# Patient Record
Sex: MALE | Race: ASIAN | NOT HISPANIC OR LATINO | URBAN - METROPOLITAN AREA
[De-identification: names, ages, dates, MRNs, and addresses within clinical notes are randomized per-mention and may not be internally consistent; named-entity substitution may affect disease eponyms.]

---

## 2015-11-13 RX ORDER — MONTELUKAST 4 MG/1
1 TABLET, CHEWABLE ORAL
Qty: 0 | Refills: 0 | DISCHARGE
Start: 2015-11-13

## 2015-11-13 RX ORDER — DIGOXIN 250 MCG
1 TABLET ORAL
Qty: 0 | Refills: 0 | DISCHARGE
Start: 2015-11-13

## 2015-11-13 RX ORDER — ALLOPURINOL 300 MG
1 TABLET ORAL
Qty: 0 | Refills: 0 | DISCHARGE
Start: 2015-11-13

## 2018-12-30 ENCOUNTER — INPATIENT (INPATIENT)
Facility: HOSPITAL | Age: 78
LOS: 4 days | Discharge: HOME | DRG: 378 | End: 2019-01-04
Attending: INTERNAL MEDICINE | Admitting: INTERNAL MEDICINE
Payer: MEDICARE

## 2018-12-30 VITALS
RESPIRATION RATE: 18 BRPM | TEMPERATURE: 98 F | HEART RATE: 109 BPM | WEIGHT: 199.96 LBS | HEIGHT: 67 IN | OXYGEN SATURATION: 97 % | SYSTOLIC BLOOD PRESSURE: 138 MMHG | DIASTOLIC BLOOD PRESSURE: 98 MMHG

## 2018-12-30 DIAGNOSIS — I73.9 PERIPHERAL VASCULAR DISEASE, UNSPECIFIED: ICD-10-CM

## 2018-12-30 DIAGNOSIS — N28.89 OTHER SPECIFIED DISORDERS OF KIDNEY AND URETER: ICD-10-CM

## 2018-12-30 DIAGNOSIS — K92.2 GASTROINTESTINAL HEMORRHAGE, UNSPECIFIED: ICD-10-CM

## 2018-12-30 DIAGNOSIS — Z98.89 OTHER SPECIFIED POSTPROCEDURAL STATES: Chronic | ICD-10-CM

## 2018-12-30 DIAGNOSIS — E11.9 TYPE 2 DIABETES MELLITUS WITHOUT COMPLICATIONS: ICD-10-CM

## 2018-12-30 DIAGNOSIS — Z96.653 PRESENCE OF ARTIFICIAL KNEE JOINT, BILATERAL: Chronic | ICD-10-CM

## 2018-12-30 DIAGNOSIS — Z98.49 CATARACT EXTRACTION STATUS, UNSPECIFIED EYE: Chronic | ICD-10-CM

## 2018-12-30 DIAGNOSIS — Z29.9 ENCOUNTER FOR PROPHYLACTIC MEASURES, UNSPECIFIED: ICD-10-CM

## 2018-12-30 DIAGNOSIS — I10 ESSENTIAL (PRIMARY) HYPERTENSION: ICD-10-CM

## 2018-12-30 DIAGNOSIS — I48.91 UNSPECIFIED ATRIAL FIBRILLATION: ICD-10-CM

## 2018-12-30 DIAGNOSIS — N40.0 BENIGN PROSTATIC HYPERPLASIA WITHOUT LOWER URINARY TRACT SYMPTOMS: ICD-10-CM

## 2018-12-30 DIAGNOSIS — E78.5 HYPERLIPIDEMIA, UNSPECIFIED: ICD-10-CM

## 2018-12-30 LAB
ALBUMIN SERPL ELPH-MCNC: 3.7 G/DL — SIGNIFICANT CHANGE UP (ref 3.3–5)
ALP SERPL-CCNC: 89 U/L — SIGNIFICANT CHANGE UP (ref 40–120)
ALT FLD-CCNC: 13 U/L — SIGNIFICANT CHANGE UP (ref 10–45)
ANION GAP SERPL CALC-SCNC: 10 MMOL/L — SIGNIFICANT CHANGE UP (ref 5–17)
APPEARANCE UR: CLEAR — SIGNIFICANT CHANGE UP
APTT BLD: 52.1 SEC — HIGH (ref 27.5–36.3)
AST SERPL-CCNC: 20 U/L — SIGNIFICANT CHANGE UP (ref 10–40)
BACTERIA # UR AUTO: NEGATIVE — SIGNIFICANT CHANGE UP
BASE EXCESS BLDV CALC-SCNC: 2.4 MMOL/L — HIGH (ref -2–2)
BASOPHILS # BLD AUTO: 0 K/UL — SIGNIFICANT CHANGE UP (ref 0–0.2)
BASOPHILS NFR BLD AUTO: 0.3 % — SIGNIFICANT CHANGE UP (ref 0–2)
BILIRUB SERPL-MCNC: 0.6 MG/DL — SIGNIFICANT CHANGE UP (ref 0.2–1.2)
BILIRUB UR-MCNC: NEGATIVE — SIGNIFICANT CHANGE UP
BLD GP AB SCN SERPL QL: NEGATIVE — SIGNIFICANT CHANGE UP
BUN SERPL-MCNC: 17 MG/DL — SIGNIFICANT CHANGE UP (ref 7–23)
CA-I SERPL-SCNC: 1.12 MMOL/L — SIGNIFICANT CHANGE UP (ref 1.12–1.3)
CALCIUM SERPL-MCNC: 9.2 MG/DL — SIGNIFICANT CHANGE UP (ref 8.4–10.5)
CHLORIDE BLDV-SCNC: 103 MMOL/L — SIGNIFICANT CHANGE UP (ref 96–108)
CHLORIDE SERPL-SCNC: 99 MMOL/L — SIGNIFICANT CHANGE UP (ref 96–108)
CO2 BLDV-SCNC: 31 MMOL/L — HIGH (ref 22–30)
CO2 SERPL-SCNC: 26 MMOL/L — SIGNIFICANT CHANGE UP (ref 22–31)
COLOR SPEC: YELLOW — SIGNIFICANT CHANGE UP
CREAT SERPL-MCNC: 0.66 MG/DL — SIGNIFICANT CHANGE UP (ref 0.5–1.3)
DIFF PNL FLD: NEGATIVE — SIGNIFICANT CHANGE UP
DIGOXIN SERPL-MCNC: 0.6 NG/ML — LOW (ref 0.8–2)
EOSINOPHIL # BLD AUTO: 0.2 K/UL — SIGNIFICANT CHANGE UP (ref 0–0.5)
EOSINOPHIL NFR BLD AUTO: 1.7 % — SIGNIFICANT CHANGE UP (ref 0–6)
EPI CELLS # UR: 0 /HPF — SIGNIFICANT CHANGE UP
GAS PNL BLDV: 129 MMOL/L — LOW (ref 136–145)
GAS PNL BLDV: SIGNIFICANT CHANGE UP
GLUCOSE BLDV-MCNC: 188 MG/DL — HIGH (ref 70–99)
GLUCOSE SERPL-MCNC: 208 MG/DL — HIGH (ref 70–99)
GLUCOSE UR QL: NEGATIVE — SIGNIFICANT CHANGE UP
HCO3 BLDV-SCNC: 30 MMOL/L — HIGH (ref 21–29)
HCT VFR BLD CALC: 40.4 % — SIGNIFICANT CHANGE UP (ref 39–50)
HCT VFR BLD CALC: 43.8 % — SIGNIFICANT CHANGE UP (ref 39–50)
HCT VFR BLDA CALC: 45 % — SIGNIFICANT CHANGE UP (ref 39–50)
HGB BLD CALC-MCNC: 14.6 G/DL — SIGNIFICANT CHANGE UP (ref 13–17)
HGB BLD-MCNC: 13.4 G/DL — SIGNIFICANT CHANGE UP (ref 13–17)
HGB BLD-MCNC: 14.7 G/DL — SIGNIFICANT CHANGE UP (ref 13–17)
HYALINE CASTS # UR AUTO: 1 /LPF — SIGNIFICANT CHANGE UP (ref 0–2)
INR BLD: 3.25 RATIO — HIGH (ref 0.88–1.16)
KETONES UR-MCNC: NEGATIVE — SIGNIFICANT CHANGE UP
LACTATE BLDV-MCNC: 3.3 MMOL/L — HIGH (ref 0.7–2)
LEUKOCYTE ESTERASE UR-ACNC: NEGATIVE — SIGNIFICANT CHANGE UP
LIDOCAIN IGE QN: 26 U/L — SIGNIFICANT CHANGE UP (ref 7–60)
LYMPHOCYTES # BLD AUTO: 1.3 K/UL — SIGNIFICANT CHANGE UP (ref 1–3.3)
LYMPHOCYTES # BLD AUTO: 10.4 % — LOW (ref 13–44)
MCHC RBC-ENTMCNC: 31.2 PG — SIGNIFICANT CHANGE UP (ref 27–34)
MCHC RBC-ENTMCNC: 31.8 PG — SIGNIFICANT CHANGE UP (ref 27–34)
MCHC RBC-ENTMCNC: 33.2 GM/DL — SIGNIFICANT CHANGE UP (ref 32–36)
MCHC RBC-ENTMCNC: 33.5 GM/DL — SIGNIFICANT CHANGE UP (ref 32–36)
MCV RBC AUTO: 94.2 FL — SIGNIFICANT CHANGE UP (ref 80–100)
MCV RBC AUTO: 95 FL — SIGNIFICANT CHANGE UP (ref 80–100)
MONOCYTES # BLD AUTO: 0.4 K/UL — SIGNIFICANT CHANGE UP (ref 0–0.9)
MONOCYTES NFR BLD AUTO: 3.6 % — SIGNIFICANT CHANGE UP (ref 2–14)
NEUTROPHILS # BLD AUTO: 10.1 K/UL — HIGH (ref 1.8–7.4)
NEUTROPHILS NFR BLD AUTO: 84 % — HIGH (ref 43–77)
NITRITE UR-MCNC: NEGATIVE — SIGNIFICANT CHANGE UP
OTHER CELLS CSF MANUAL: 9 ML/DL — LOW (ref 18–22)
PCO2 BLDV: 59 MMHG — HIGH (ref 35–50)
PH BLDV: 7.32 — LOW (ref 7.35–7.45)
PH UR: 7 — SIGNIFICANT CHANGE UP (ref 5–8)
PLATELET # BLD AUTO: 177 K/UL — SIGNIFICANT CHANGE UP (ref 150–400)
PLATELET # BLD AUTO: 184 K/UL — SIGNIFICANT CHANGE UP (ref 150–400)
PO2 BLDV: 28 MMHG — SIGNIFICANT CHANGE UP (ref 25–45)
POTASSIUM BLDV-SCNC: 5.5 MMOL/L — HIGH (ref 3.5–5.3)
POTASSIUM SERPL-MCNC: 4.6 MMOL/L — SIGNIFICANT CHANGE UP (ref 3.5–5.3)
POTASSIUM SERPL-SCNC: 4.6 MMOL/L — SIGNIFICANT CHANGE UP (ref 3.5–5.3)
PROT SERPL-MCNC: 6.9 G/DL — SIGNIFICANT CHANGE UP (ref 6–8.3)
PROT UR-MCNC: SIGNIFICANT CHANGE UP
PROTHROM AB SERPL-ACNC: 38.4 SEC — HIGH (ref 10–12.9)
RBC # BLD: 4.29 M/UL — SIGNIFICANT CHANGE UP (ref 4.2–5.8)
RBC # BLD: 4.61 M/UL — SIGNIFICANT CHANGE UP (ref 4.2–5.8)
RBC # FLD: 13.4 % — SIGNIFICANT CHANGE UP (ref 10.3–14.5)
RBC # FLD: 13.8 % — SIGNIFICANT CHANGE UP (ref 10.3–14.5)
RBC CASTS # UR COMP ASSIST: 6 /HPF — HIGH (ref 0–4)
RH IG SCN BLD-IMP: POSITIVE — SIGNIFICANT CHANGE UP
RH IG SCN BLD-IMP: POSITIVE — SIGNIFICANT CHANGE UP
SAO2 % BLDV: 44 % — LOW (ref 67–88)
SODIUM SERPL-SCNC: 135 MMOL/L — SIGNIFICANT CHANGE UP (ref 135–145)
SP GR SPEC: 1.02 — SIGNIFICANT CHANGE UP (ref 1.01–1.02)
UROBILINOGEN FLD QL: NEGATIVE — SIGNIFICANT CHANGE UP
WBC # BLD: 12.1 K/UL — HIGH (ref 3.8–10.5)
WBC # BLD: 7.9 K/UL — SIGNIFICANT CHANGE UP (ref 3.8–10.5)
WBC # FLD AUTO: 12.1 K/UL — HIGH (ref 3.8–10.5)
WBC # FLD AUTO: 7.9 K/UL — SIGNIFICANT CHANGE UP (ref 3.8–10.5)
WBC UR QL: 1 /HPF — SIGNIFICANT CHANGE UP (ref 0–5)

## 2018-12-30 PROCEDURE — 74177 CT ABD & PELVIS W/CONTRAST: CPT | Mod: 26

## 2018-12-30 PROCEDURE — 71045 X-RAY EXAM CHEST 1 VIEW: CPT | Mod: 26

## 2018-12-30 PROCEDURE — 99285 EMERGENCY DEPT VISIT HI MDM: CPT

## 2018-12-30 RX ORDER — TAMSULOSIN HYDROCHLORIDE 0.4 MG/1
0.4 CAPSULE ORAL AT BEDTIME
Qty: 0 | Refills: 0 | Status: DISCONTINUED | OUTPATIENT
Start: 2018-12-30 | End: 2019-01-04

## 2018-12-30 RX ORDER — LOSARTAN POTASSIUM 100 MG/1
50 TABLET, FILM COATED ORAL
Qty: 0 | Refills: 0 | Status: DISCONTINUED | OUTPATIENT
Start: 2018-12-30 | End: 2019-01-04

## 2018-12-30 RX ORDER — SODIUM CHLORIDE 9 MG/ML
500 INJECTION INTRAMUSCULAR; INTRAVENOUS; SUBCUTANEOUS ONCE
Qty: 0 | Refills: 0 | Status: COMPLETED | OUTPATIENT
Start: 2018-12-30 | End: 2018-12-30

## 2018-12-30 RX ORDER — PANTOPRAZOLE SODIUM 20 MG/1
8 TABLET, DELAYED RELEASE ORAL
Qty: 80 | Refills: 0 | Status: DISCONTINUED | OUTPATIENT
Start: 2018-12-30 | End: 2019-01-02

## 2018-12-30 RX ORDER — ALLOPURINOL 300 MG
300 TABLET ORAL AT BEDTIME
Qty: 0 | Refills: 0 | Status: DISCONTINUED | OUTPATIENT
Start: 2018-12-30 | End: 2019-01-04

## 2018-12-30 RX ORDER — ATORVASTATIN CALCIUM 80 MG/1
20 TABLET, FILM COATED ORAL AT BEDTIME
Qty: 0 | Refills: 0 | Status: DISCONTINUED | OUTPATIENT
Start: 2018-12-30 | End: 2019-01-04

## 2018-12-30 RX ORDER — MONTELUKAST 4 MG/1
10 TABLET, CHEWABLE ORAL AT BEDTIME
Qty: 0 | Refills: 0 | Status: DISCONTINUED | OUTPATIENT
Start: 2018-12-30 | End: 2019-01-04

## 2018-12-30 RX ORDER — DIGOXIN 250 MCG
0.25 TABLET ORAL DAILY
Qty: 0 | Refills: 0 | Status: DISCONTINUED | OUTPATIENT
Start: 2018-12-30 | End: 2019-01-04

## 2018-12-30 RX ORDER — CARVEDILOL PHOSPHATE 80 MG/1
25 CAPSULE, EXTENDED RELEASE ORAL EVERY 12 HOURS
Qty: 0 | Refills: 0 | Status: DISCONTINUED | OUTPATIENT
Start: 2018-12-30 | End: 2019-01-04

## 2018-12-30 RX ADMIN — MONTELUKAST 10 MILLIGRAM(S): 4 TABLET, CHEWABLE ORAL at 22:05

## 2018-12-30 RX ADMIN — TAMSULOSIN HYDROCHLORIDE 0.4 MILLIGRAM(S): 0.4 CAPSULE ORAL at 22:05

## 2018-12-30 RX ADMIN — SODIUM CHLORIDE 500 MILLILITER(S): 9 INJECTION INTRAMUSCULAR; INTRAVENOUS; SUBCUTANEOUS at 15:26

## 2018-12-30 RX ADMIN — LOSARTAN POTASSIUM 50 MILLIGRAM(S): 100 TABLET, FILM COATED ORAL at 22:05

## 2018-12-30 RX ADMIN — CARVEDILOL PHOSPHATE 25 MILLIGRAM(S): 80 CAPSULE, EXTENDED RELEASE ORAL at 22:05

## 2018-12-30 RX ADMIN — PANTOPRAZOLE SODIUM 10 MG/HR: 20 TABLET, DELAYED RELEASE ORAL at 20:16

## 2018-12-30 RX ADMIN — Medication 300 MILLIGRAM(S): at 22:05

## 2018-12-30 RX ADMIN — ATORVASTATIN CALCIUM 20 MILLIGRAM(S): 80 TABLET, FILM COATED ORAL at 22:05

## 2018-12-30 RX ADMIN — SODIUM CHLORIDE 500 MILLILITER(S): 9 INJECTION INTRAMUSCULAR; INTRAVENOUS; SUBCUTANEOUS at 13:29

## 2018-12-30 NOTE — H&P ADULT - NSHPREVIEWOFSYSTEMS_GEN_ALL_CORE
REVIEW OF SYSTEMS:    CONSTITUTIONAL: No weakness, fevers or chills  EYES/ENT: No visual changes;  No vertigo or throat pain   NECK: No pain or stiffness  RESPIRATORY: No cough, wheezing, hemoptysis; No shortness of breath  CARDIOVASCULAR: No chest pain or palpitations  GASTROINTESTINAL: bloody and black stool   GENITOURINARY: No dysuria, frequency or hematuria  NEUROLOGICAL: No numbness or weakness  SKIN: No itching, burning, rashes, or lesions   All other review of systems is negative unless indicated above.

## 2018-12-30 NOTE — H&P ADULT - PROBLEM SELECTOR PLAN 3
suprather NR  Hold Coumadin   Card evaluation Dr Walden   restart Dig, Carvedilol and losartan   monitor vitals suprather NR  Hold Coumadin, restart after bleeding resolved if clear y GI and Card   Card evaluation Dr Walden   restart Dig, Carvedilol and losartan   monitor vitals

## 2018-12-30 NOTE — H&P ADULT - PROBLEM SELECTOR PLAN 1
GI evaluation  IV hydration   active type and screen   CT Abd/pelv noted.? mass. also 0.8 cm renal mass   protonix drip   liquid diet

## 2018-12-30 NOTE — ED ADULT NURSE NOTE - TEMPLATE
Advancement-Rotation Flap Text: The defect edges were debeveled with a #15 scalpel blade. Given the location of the defect, shape of the defect and the proximity to free margins an advancement-rotation flap was deemed most appropriate. Using a sterile surgical marker, an appropriate flap was drawn incorporating the defect and placing the expected incisions within the relaxed skin tension lines where possible. The area thus outlined was incised deep to adipose tissue with a #15 scalpel blade. The skin margins were undermined to an appropriate distance in all directions utilizing iris scissors. Stage 13: Number Of Blocks?: 0 Surgeon/Pathologist Verbiage (Will Incorporate Name Of Surgeon From Intro If Not Blank): operated in two distinct and integrated capacities as the surgeon and pathologist. Mohs Histo Method Verbiage: Each section was then chromacoded and processed in the Mohs lab using the Mohs protocol and submitted for frozen section. Cheiloplasty (Complex) Text: A decision was made to reconstruct the defect with a  cheiloplasty. The defect was undermined extensively. Additional obicularis oris muscle was excised with a 15 blade scalpel. The defect was converted into a full thickness wedge to facilite a better cosmetic result. Small vessels were then tied off with 5-0 monocyrl. The obicularis oris, superficial fascia, adipose and dermis were then reapproximated. After the deeper layers were approximated the epidermis was reapproximated with particular care given to realign the vermilion border. Stage 14: Additional Anesthesia Type: 1% lidocaine with epinephrine Referring Physician (Optional): Miryam Humphrey Referred To Oculoplastics For Closure Text (Leave Blank If You Do Not Want): After obtaining clear surgical margins the patient was sent to oculoplastics for surgical repair. The patient understands they will receive post-surgical care and follow-up from the referring physician's office. Postop Diagnosis: same Biopsy Photograph Reviewed: Yes Island Pedicle Flap-Requiring Vessel Identification Text: The defect edges were debeveled with a #15 scalpel blade. Given the location of the defect, shape of the defect and the proximity to free margins an island pedicle advancement flap was deemed most appropriate. Using a sterile surgical marker, an appropriate advancement flap was drawn, based on the axial vessel mentioned above, incorporating the defect, outlining the appropriate donor tissue and placing the expected incisions within the relaxed skin tension lines where possible. The area thus outlined was incised deep to adipose tissue with a #15 scalpel blade. The skin margins were undermined to an appropriate distance in all directions around the primary defect and laterally outward around the island pedicle utilizing iris scissors. There was minimal undermining beneath the pedicle flap. Hemostasis: Electrocautery Ear Star Wedge Flap Text: The defect edges were debeveled with a #15 blade scalpel. Given the location of the defect and the proximity to free margins (helical rim) an ear star wedge flap was deemed most appropriate. Using a sterile surgical marker, the appropriate flap was drawn incorporating the defect and placing the expected incisions between the helical rim and antihelix where possible. The area thus outlined was incised through and through with a #15 scalpel blade. Anesthesia Volume In Cc: 3 Stage 1: Number Of Blocks?: 1 Scc Histology Text: There is hyperkeratosis, acanthosis, and cytologic atypia of keratinocytes with hyperchromatic and pleomorphic nuclei throughout the epidermis. No dermal invasion is seen. Chronic inflammation is present in the dermis. Patient Will Remove Sutures At Home?: No Composite Graft Text: The defect edges were debeveled with a #15 scalpel blade. Given the location of the defect, shape of the defect, the proximity to free margins and the fact the defect was full thickness a composite graft was deemed most appropriate. The defect was outline and then transferred to the donor site. A full thickness graft was then excised from the donor site. The graft was then placed in the primary defect, oriented appropriately and then sutured into place. The secondary defect was then repaired using a primary closure. Bcc Superficial Histology Text: Beneath an irregular epidermis, there are small nodular masses of basaloid neoplastic cells with nuclear pleomorphism attached to the basal layer and surrounded by a loose fibrous stroma and mild inflammation in the superficial dermis. The findings are typical for a superficial type of basal cell carcinoma. Crescentic Advancement Flap Text: The defect edges were debeveled with a #15 scalpel blade. Given the location of the defect and the proximity to free margins a crescentic advancement flap was deemed most appropriate. Using a sterile surgical marker, the appropriate advancement flap was drawn incorporating the defect and placing the expected incisions within the relaxed skin tension lines where possible. The area thus outlined was incised deep to adipose tissue with a #15 scalpel blade. The skin margins were undermined to an appropriate distance in all directions utilizing iris scissors. No Repair - Repaired With Adjacent Surgical Defect Text (Leave Blank If You Do Not Want): After obtaining clear surgical margins the defect was repaired concurrently with another surgical defect which was in close approximation. Home Suture Removal Text: Patient was provided instructions on removing sutures and will remove their sutures at home. If they have any questions or difficulties they will call the office. Melanoma In Situ Histology Text: On a sun-damaged skin, there is a broad and asymmetrical proliferation of enlarged and atypical melanocytes present continuously as single cells and in small irregular coalescing nests along the dermoepidermal junction. The melanocytes extend into the superficial portions of epithelial structures of adnexa. Pagetoid spread of melanocytes is appreciated. Occasional mitotic figures and individually necrotic melanocytes are also noted. In the underlying papillary dermis, there is mild lymphocytic inflammatory infiltrate with prominent dermal fibroplasia and melanophages. Bilobed Transposition Flap Text: The defect edges were debeveled with a #15 scalpel blade. Given the location of the defect and the proximity to free margins a bilobed transposition flap was deemed most appropriate. Using a sterile surgical marker, an appropriate bilobe flap drawn around the defect. The area thus outlined was incised deep to adipose tissue with a #15 scalpel blade. The skin margins were undermined to an appropriate distance in all directions utilizing iris scissors. Depth Of Tumor Invasion (For Histology): adipose tissue Consent (Temporal Branch)/Introductory Paragraph: The rationale for Mohs was explained to the patient and consent was obtained. The risks, benefits and alternatives to therapy were discussed in detail. Specifically, the risks of damage to the temporal branch of the facial nerve, infection, scarring, bleeding, prolonged wound healing, incomplete removal, allergy to anesthesia, and recurrence were addressed. Prior to the procedure, the treatment site was clearly identified and confirmed by the patient. All components of Universal Protocol/PAUSE Rule completed. Full Thickness Lip Wedge Repair (Flap) Text: Given the location of the defect and the proximity to free margins a full thickness wedge repair was deemed most appropriate. Using a sterile surgical marker, the appropriate repair was drawn incorporating the defect and placing the expected incisions perpendicular to the vermilion border. The vermilion border was also meticulously outlined to ensure appropriate reapproximation during the repair. The area thus outlined was incised through and through with a #15 scalpel blade. The muscularis and dermis were reaproximated with deep sutures following hemostasis. Care was taken to realign the vermilion border before proceeding with the superficial closure. Once the vermilion was realigned the superfical and mucosal closure was finished. O-L Flap Text: The defect edges were debeveled with a #15 scalpel blade. Given the location of the defect, shape of the defect and the proximity to free margins an O-L flap was deemed most appropriate. Using a sterile surgical marker, an appropriate advancement flap was drawn incorporating the defect and placing the expected incisions within the relaxed skin tension lines where possible. The area thus outlined was incised deep to adipose tissue with a #15 scalpel blade. The skin margins were undermined to an appropriate distance in all directions utilizing iris scissors. Rotation Flap Text: The defect edges were debeveled with a #15 scalpel blade. Given the location of the defect, shape of the defect and the proximity to free margins a rotation flap was deemed most appropriate. Using a sterile surgical marker, an appropriate rotation flap was drawn incorporating the defect and placing the expected incisions within the relaxed skin tension lines where possible. The area thus outlined was incised deep to adipose tissue with a #15 scalpel blade. The skin margins were undermined to an appropriate distance in all directions utilizing iris scissors. Referred To Plastics For Closure Text (Leave Blank If You Do Not Want): After obtaining clear surgical margins the patient was sent to plastics for surgical repair. The patient understands they will receive post-surgical care and follow-up from the referring physician's office. Closure 2 Information: This tab is for additional flaps and grafts, including complex repair and grafts and complex repair and flaps. You can also specify a different location for the additional defect, if the location is the same you do not need to select a new one. We will insert the automated text for the repair you select below just as we do for solitary flaps and grafts. Please note that at this time if you select a location with a different insurance zone you will need to override the ICD10 and CPT if appropriate. Anesthesia Type: 2% lidocaine with epinephrine and a 1:12 solution of 8.4% sodium bicarbonate Dermal Autograft Text: The defect edges were debeveled with a #15 scalpel blade. Given the location of the defect, shape of the defect and the proximity to free margins a dermal autograft was deemed most appropriate. Using a sterile surgical marker, the primary defect shape was transferred to the donor site. The area thus outlined was incised deep to adipose tissue with a #15 scalpel blade. The harvested graft was then trimmed of adipose and epidermal tissue until only dermis was left. The skin graft was then placed in the primary defect and oriented appropriately. Helical Rim Advancement Flap Text: The defect edges were debeveled with a #15 blade scalpel. Given the location of the defect and the proximity to free margins (helical rim) a double helical rim advancement flap was deemed most appropriate. Using a sterile surgical marker, the appropriate advancement flaps were drawn incorporating the defect and placing the expected incisions between the helical rim and antihelix where possible. The area thus outlined was incised through and through with a #15 scalpel blade. With a skin hook and iris scissors, the flaps were gently and sharply undermined and freed up. Stage 6: Additional Anesthesia Type: 2% lidocaine without epinephrine and a 1:12 solution of 8.4% sodium bicarbonate Bcc Infiltrative Histology Text: There were numerous aggregates of basaloid cells demonstrating an infiltrative pattern. Medical Necessity Statement: Based on my medical judgement, standard excision and/or destruction technique methods are not clinically sufficient treatment options  . To prevent the risk of compromising surgical cure and reconstruction; prompt microscopic examination of the surgical margins is necessary. Based on the given indication(s), maximum conservation of healthy tissue, and high cure rate, Mohs surgery is the most appropriate treatment for this cancer. Alternatives Discussed Intro (Do Not Add Period): I discussed alternative treatments to Mohs surgery and specifically discussed the risks and benefits of Interpolation Flap Text: A decision was made to reconstruct the defect utilizing an interpolation axial flap and a staged reconstruction. A telfa template was made of the defect. This telfa template was then used to outline the interpolation flap. The donor area for the pedicle flap was then injected with anesthesia. The flap was excised through the skin and subcutaneous tissue down to the layer of the underlying musculature. The interpolation flap was carefully excised within this deep plane to maintain its blood supply. The edges of the donor site were undermined. The donor site was closed in a primary fashion. The pedicle was then rotated into position and sutured. Once the tube was sutured into place, adequate blood supply was confirmed with blanching and refill. The pedicle was then wrapped with xeroform gauze and dressed appropriately with a telfa and gauze bandage to ensure continued blood supply and protect the attached pedicle. Dorsal Nasal Flap Text: The defect edges were debeveled with a #15 scalpel blade. Given the location of the defect and the proximity to free margins a dorsal nasal flap was deemed most appropriate. Using a sterile surgical marker, an appropriate dorsal nasal flap was drawn around the defect. The area thus outlined was incised deep to adipose tissue with a #15 scalpel blade. The skin margins were undermined to an appropriate distance in all directions utilizing iris scissors. Consent (Lip)/Introductory Paragraph: The rationale for Mohs was explained to the patient and consent was obtained. The risks, benefits and alternatives to therapy were discussed in detail. Specifically, the risks of lip deformity, changes in the oral aperture, infection, scarring, bleeding, prolonged wound healing, incomplete removal, allergy to anesthesia, nerve injury and recurrence were addressed. Prior to the procedure, the treatment site was clearly identified and confirmed by the patient. All components of Universal Protocol/PAUSE Rule completed. Epidermal Sutures: 5-0 Fast Absorbing Gut Wound Care: Vaseline Z Plasty Text: The lesion was extirpated to the level of the fat with a #15 scalpel blade. Given the location of the defect, shape of the defect and the proximity to free margins a Z-plasty was deemed most appropriate for repair. Using a sterile surgical marker, the appropriate transposition arms of the Z-plasty were drawn incorporating the defect and placing the expected incisions within the relaxed skin tension lines where possible. The area thus outlined was incised deep to adipose tissue with a #15 scalpel blade. The skin margins were undermined to an appropriate distance in all directions utilizing iris scissors. The opposing transposition arms were then transposed into place in opposite direction and anchored with interrupted buried subcutaneous sutures. Mohs Case Number: [id number] [place] Abdominal Pain, N/V/D Consent 1/Introductory Paragraph: Various treatment options for skin cancer treatment; including but not limited to no treatment, cryosurgery or cryotherapy, excision, radiation therapy, electrodessication and curettage, topical therapeutic agents and light therapy were discussed in depth with the patient. The rationale for Mohs was explained to the patient and consent was obtained. The risks, benefits and alternatives to therapy were discussed in detail. Specifically, the risks of infection, scarring, bleeding, prolonged wound healing, incomplete removal, allergy to anesthesia, nerve injury and recurrence were addressed. Prior to the procedure, the treatment site was clearly identified and confirmed by the patient and the patient agreed that Mohs surgery is the best treatment option for their lesion. No guarantees were made or implied; close follow-up was stressed out to the patient. All components of Universal Protocol/PAUSE Rule completed. Purse String (Intermediate) Text: Given the location of the defect and the characteristics of the surrounding skin a pursestring intermediate closure was deemed most appropriate. Undermining was performed circumfirentially around the surgical defect. A purstring suture was then placed and tightened. Paramedian Forehead Flap Text: A decision was made to reconstruct the defect utilizing an interpolation axial flap and a staged reconstruction. A telfa template was made of the defect. This telfa template was then used to outline the paramedian forehead pedicle flap. The donor area for the pedicle flap was then injected with anesthesia. The flap was excised through the skin and subcutaneous tissue down to the layer of the underlying musculature. The pedicle flap was carefully excised within this deep plane to maintain its blood supply. The edges of the donor site were undermined. The donor site was closed in a primary fashion. The pedicle was then rotated into position and sutured. Once the tube was sutured into place, adequate blood supply was confirmed with blanching and refill. The pedicle was then wrapped with xeroform gauze and dressed appropriately with a telfa and gauze bandage to ensure continued blood supply and protect the attached pedicle. Area L Indication Text: Tumors in this location are included in Area L (trunk and extremities). Mohs surgery is indicated for larger tumors, or tumors with aggressive histologic features, in these anatomic locations. Crescentic Complex Repair Preamble Text (Leave Blank If You Do Not Want): Extensive wide undermining was performed. Mastoid Interpolation Flap Text: A decision was made to reconstruct the defect utilizing an interpolation axial flap and a staged reconstruction. A telfa template was made of the defect. This telfa template was then used to outline the mastoid interpolation flap. The donor area for the pedicle flap was then injected with anesthesia. The flap was excised through the skin and subcutaneous tissue down to the layer of the underlying musculature. The pedicle flap was carefully excised within this deep plane to maintain its blood supply. The edges of the donor site were undermined. The donor site was closed in a primary fashion. The pedicle was then rotated into position and sutured. Once the tube was sutured into place, adequate blood supply was confirmed with blanching and refill. The pedicle was then wrapped with xeroform gauze and dressed appropriately with a telfa and gauze bandage to ensure continued blood supply and protect the attached pedicle. Split-Thickness Skin Graft Text: The defect edges were debeveled with a #15 scalpel blade. Given the location of the defect, shape of the defect and the proximity to free margins a split thickness skin graft was deemed most appropriate. Using a sterile surgical marker, the primary defect shape was transferred to the donor site. The split thickness graft was then harvested. The skin graft was then placed in the primary defect and oriented appropriately. H Plasty Text: Given the location of the defect, shape of the defect and the proximity to free margins a H-plasty was deemed most appropriate for repair. Using a sterile surgical marker, the appropriate advancement arms of the H-plasty were drawn incorporating the defect and placing the expected incisions within the relaxed skin tension lines where possible. The area thus outlined was incised deep to adipose tissue with a #15 scalpel blade. The skin margins were undermined to an appropriate distance in all directions utilizing iris scissors. The opposing advancement arms were then advanced into place in opposite direction and anchored with interrupted buried subcutaneous sutures. Scc Ka Subtype Histology Text: there is a cup-shaped exoendophytic epithelial neoplasm characterized by proliferations of keratinocytes with abundant eosinophilic glossy cytoplasm and nuclei with open chromatin in the papillary and upper reticular dermis. Multiple inclusions containing elastic material are seen within the cytoplasm. The features are of squamous cell carcinoma, keratoacanthoma type. Closure 4 Information: This tab is for additional flaps and grafts above and beyond our usual structured repairs. Please note if you enter information here it will not currently bill and you will need to add the billing information manually. Island Pedicle Flap With Canthal Suspension Text: The defect edges were debeveled with a #15 scalpel blade. Given the location of the defect, shape of the defect and the proximity to free margins an island pedicle advancement flap was deemed most appropriate. Using a sterile surgical marker, an appropriate advancement flap was drawn incorporating the defect, outlining the appropriate donor tissue and placing the expected incisions within the relaxed skin tension lines where possible. The area thus outlined was incised deep to adipose tissue with a #15 scalpel blade. The skin margins were undermined to an appropriate distance in all directions around the primary defect and laterally outward around the island pedicle utilizing iris scissors. There was minimal undermining beneath the pedicle flap. A suspension suture was placed in the canthal tendon to prevent tension and prevent ectropion. Stage 4: Additional Anesthesia Type: 2% lidocaine with epinephrine and a 1:10 solution of 8.4% sodium bicarbonate Mucosal Advancement Flap Text: Given the location of the defect, shape of the defect and the proximity to free margins a mucosal advancement flap was deemed most appropriate. Incisions were made with a 15 blade scalpel in the appropriate fashion along the cutaneous vermilion border and the mucosal lip. The remaining actinically damaged mucosal tissue was excised. The mucosal advancement flap was then elevated to the gingival sulcus with care taken to preserve the neurovascular structures and advanced into the primary defect. Care was taken to ensure that precise realignment of the vermilion border was achieved. Crescentic Intermediate Repair Preamble Text (Leave Blank If You Do Not Want): Undermining was performed with blunt dissection. Tumor Debulked?: curette Scc In Situ Histology Text: There is hyperkeratosis, acanthosis, and cytologic atypia of keratinocytes with hyperchromatic and pleomorphic nuclei throughout the full thickness of the epidermis. No dermal invasion is seen. Chronic inflammation is present in the dermis. Subsequent Stages Histo Method Verbiage: Using a similar technique to that described above, a thin layer of tissue was removed from all areas where tumor was visible on the previous stage. The tissue was again oriented, mapped, dyed, and processed as above. Consent (Nose)/Introductory Paragraph: The rationale for Mohs was explained to the patient and consent was obtained. The risks, benefits and alternatives to therapy were discussed in detail. Specifically, the risks of nasal deformity, changes in the flow of air through the nose, infection, scarring, bleeding, prolonged wound healing, incomplete removal, allergy to anesthesia, nerve injury and recurrence were addressed. Prior to the procedure, the treatment site was clearly identified and confirmed by the patient. All components of Universal Protocol/PAUSE Rule completed. Referred To Asc For Closure Text (Leave Blank If You Do Not Want): After obtaining clear surgical margins the patient was sent to an [facility] for surgical repair. The patient understands they will receive post-surgical care and follow-up from the [facility] physician. Eye Protection Verbiage: Before proceeding with the stage, a plastic scleral shield was inserted. The globe was anesthetized with a few drops of 1% lidocaine with 1:100,000 epinephrine. Then, an appropriate sized scleral shield was chosen and coated with lacrilube ointment. The shield was gently inserted and left in place for the duration of each stage. After the stage was completed, the shield was gently removed. Bi-Rhombic Flap Text: The defect edges were debeveled with a #15 scalpel blade. Given the location of the defect and the proximity to free margins a bi-rhombic flap was deemed most appropriate. Using a sterile surgical marker, an appropriate rhombic flap was drawn incorporating the defect. The area thus outlined was incised deep to adipose tissue with a #15 scalpel blade. The skin margins were undermined to an appropriate distance in all directions utilizing iris scissors. Detail Level: Detailed Body Location Override (Optional - Billing Will Still Be Based On Selected Body Map Location If Applicable): R neck Trilobed Flap Text: The defect edges were debeveled with a #15 scalpel blade. Given the location of the defect and the proximity to free margins a trilobed flap was deemed most appropriate. Using a sterile surgical marker, an appropriate trilobed flap drawn around the defect. The area thus outlined was incised deep to adipose tissue with a #15 scalpel blade. The skin margins were undermined to an appropriate distance in all directions utilizing iris scissors. Consent 2/Introductory Paragraph: Mohs surgery was explained to the patient and consent was obtained. The risks, benefits and alternatives to therapy were discussed in detail. Specifically, the risks of infection, scarring, bleeding, prolonged wound healing, incomplete removal, allergy to anesthesia, nerve injury and recurrence were addressed. Prior to the procedure, the treatment site was clearly identified and confirmed by the patient. All components of Universal Protocol/PAUSE Rule completed. Complex Repair And Flap Additional Text (Will Appearing After The Standard Complex Repair Text): The complex repair was not sufficient to completely close the primary defect. The remaining additional defect was repaired with the flap mentioned below. O-T Plasty Text: The defect edges were debeveled with a #15 scalpel blade. Given the location of the defect, shape of the defect and the proximity to free margins an O-T plasty was deemed most appropriate. Using a sterile surgical marker, an appropriate O-T plasty was drawn incorporating the defect and placing the expected incisions within the relaxed skin tension lines where possible. The area thus outlined was incised deep to adipose tissue with a #15 scalpel blade. The skin margins were undermined to an appropriate distance in all directions utilizing iris scissors. Tissue Cultured Epidermal Autograft Text: The defect edges were debeveled with a #15 scalpel blade. Given the location of the defect, shape of the defect and the proximity to free margins a tissue cultured epidermal autograft was deemed most appropriate. The graft was then trimmed to fit the size of the defect. The graft was then placed in the primary defect and oriented appropriately. No Residual Tumor Seen Histology Text: There were no malignant cells seen in the sections examined. X Size Of Lesion In Cm (Optional): 0.7 Melolabial Transposition Flap Text: The defect edges were debeveled with a #15 scalpel blade. Given the location of the defect and the proximity to free margins a melolabial flap was deemed most appropriate. Using a sterile surgical marker, an appropriate melolabial transposition flap was drawn incorporating the defect. The area thus outlined was incised deep to adipose tissue with a #15 scalpel blade. The skin margins were undermined to an appropriate distance in all directions utilizing iris scissors. Simple / Intermediate / Complex Repair - Final Wound Length In Cm: 2.7 Advancement Flap (Single) Text: The defect edges were debeveled with a #15 scalpel blade. Given the location of the defect and the proximity to free margins a single advancement flap was deemed most appropriate. Using a sterile surgical marker, an appropriate advancement flap was drawn incorporating the defect and placing the expected incisions within the relaxed skin tension lines where possible. The area thus outlined was incised deep to adipose tissue with a #15 scalpel blade. The skin margins were undermined to an appropriate distance in all directions utilizing iris scissors. Surgeon Performing Repair (Optional): Keyla Fernandez Purse String (Simple) Text: Given the location of the defect and the characteristics of the surrounding skin a pursestring closure was deemed most appropriate. Undermining was performed circumfirentially around the surgical defect. A purstring suture was then placed and tightened. Surgical Defect Length In Cm (Optional): 1.5 Consent (Near Eyelid Margin)/Introductory Paragraph: The rationale for Mohs was explained to the patient and consent was obtained. The risks, benefits and alternatives to therapy were discussed in detail. Specifically, the risks of ectropion or eyelid deformity, infection, scarring, bleeding, prolonged wound healing, incomplete removal, allergy to anesthesia, nerve injury and recurrence were addressed. Prior to the procedure, the treatment site was clearly identified and confirmed by the patient. All components of Universal Protocol/PAUSE Rule completed. Cheek Interpolation Flap Text: A decision was made to reconstruct the defect utilizing an interpolation axial flap and a staged reconstruction. A telfa template was made of the defect. This telfa template was then used to outline the Cheek Interpolation flap. The donor area for the pedicle flap was then injected with anesthesia. The flap was excised through the skin and subcutaneous tissue down to the layer of the underlying musculature. The interpolation flap was carefully excised within this deep plane to maintain its blood supply. The edges of the donor site were undermined. The donor site was closed in a primary fashion. The pedicle was then rotated into position and sutured. Once the tube was sutured into place, adequate blood supply was confirmed with blanching and refill. The pedicle was then wrapped with xeroform gauze and dressed appropriately with a telfa and gauze bandage to ensure continued blood supply and protect the attached pedicle. Burow's Advancement Flap Text: The defect edges were debeveled with a #15 scalpel blade. Given the location of the defect and the proximity to free margins a Burow's advancement flap was deemed most appropriate. Using a sterile surgical marker, the appropriate advancement flap was drawn incorporating the defect and placing the expected incisions within the relaxed skin tension lines where possible. The area thus outlined was incised deep to adipose tissue with a #15 scalpel blade. The skin margins were undermined to an appropriate distance in all directions utilizing iris scissors. Afx Histology Text: there is a poorly differentiated spindled cell neoplasm composed of fascicles of atypical spindled and epithelioid cells, infiltrating and replacing papillary and reticular dermis. Mitotic activity is brisk, including atypical forms. The lesion is present on a sun-damaged skin. This pattern supports the diagnosis of atypical fibrous xanthoma. Area H Indication Text: Tumors in this location are included in Area H (eyelids, eyebrows, nose, lips, chin, ear, pre-auricular, post-auricular, temple, genitalia, hands, feet, ankles and areola). Tissue conservation is critical in these anatomic locations. Mauc Instructions: By selecting yes to the question below the [facility] number will be added into the note. This will be calculated automatically based on the diagnosis chosen, the size entered, the body zone selected (H,M,L) and the specific indications you chose. You will also have the option to override the Mohs AUC if you disagree with the automatically calculated number and this option is found in the Case Summary tab. A-T Advancement Flap Text: The defect edges were debeveled with a #15 scalpel blade. Given the location of the defect, shape of the defect and the proximity to free margins an A-T advancement flap was deemed most appropriate. Using a sterile surgical marker, an appropriate advancement flap was drawn incorporating the defect and placing the expected incisions within the relaxed skin tension lines where possible. The area thus outlined was incised deep to adipose tissue with a #15 scalpel blade. The skin margins were undermined to an appropriate distance in all directions utilizing iris scissors. Unna Boot Text: An Unna boot was placed to help immobilize the limb and facilitate more rapid healing. Bilobed Flap Text: The defect edges were debeveled with a #15 scalpel blade. Given the location of the defect and the proximity to free margins a bilobe flap was deemed most appropriate. Using a sterile surgical marker, an appropriate bilobe flap drawn around the defect. The area thus outlined was incised deep to adipose tissue with a #15 scalpel blade. The skin margins were undermined to an appropriate distance in all directions utilizing iris scissors. Muscle Hinge Flap Text: The defect edges were debeveled with a #15 scalpel blade. Given the size, depth and location of the defect and the proximity to free margins a muscle hinge flap was deemed most appropriate. Using a sterile surgical marker, an appropriate hinge flap was drawn incorporating the defect. The area thus outlined was incised with a #15 scalpel blade. The skin margins were undermined to an appropriate distance in all directions utilizing iris scissors. Repair Type: Complex Repair S Plasty Text: Given the location and shape of the defect, and the orientation of relaxed skin tension lines, an S-plasty was deemed most appropriate for repair. Using a sterile surgical marker, the appropriate outline of the S-plasty was drawn, incorporating the defect and placing the expected incisions within the relaxed skin tension lines where possible. The area thus outlined was incised deep to adipose tissue with a #15 scalpel blade. The skin margins were undermined to an appropriate distance in all directions utilizing iris scissors. The skin flaps were advanced over the defect. The opposing margins were then approximated with interrupted buried subcutaneous sutures. Modified Advancement Flap Text: The defect edges were debeveled with a #15 scalpel blade. Given the location of the defect, shape of the defect and the proximity to free margins a modified advancement flap was deemed most appropriate. Using a sterile surgical marker, an appropriate advancement flap was drawn incorporating the defect and placing the expected incisions within the relaxed skin tension lines where possible. The area thus outlined was incised deep to adipose tissue with a #15 scalpel blade. The skin margins were undermined to an appropriate distance in all directions utilizing iris scissors. Cheek-To-Nose Interpolation Flap Text: A decision was made to reconstruct the defect utilizing an interpolation axial flap and a staged reconstruction. A telfa template was made of the defect. This telfa template was then used to outline the Cheek-To-Nose Interpolation flap. The donor area for the pedicle flap was then injected with anesthesia. The flap was excised through the skin and subcutaneous tissue down to the layer of the underlying musculature. The interpolation flap was carefully excised within this deep plane to maintain its blood supply. The edges of the donor site were undermined. The donor site was closed in a primary fashion. The pedicle was then rotated into position and sutured. Once the tube was sutured into place, adequate blood supply was confirmed with blanching and refill. The pedicle was then wrapped with xeroform gauze and dressed appropriately with a telfa and gauze bandage to ensure continued blood supply and protect the attached pedicle. Consent (Marginal Mandibular)/Introductory Paragraph: The rationale for Mohs was explained to the patient and consent was obtained. The risks, benefits and alternatives to therapy were discussed in detail. Specifically, the risks of damage to the marginal mandibular branch of the facial nerve, infection, scarring, bleeding, prolonged wound healing, incomplete removal, allergy to anesthesia, and recurrence were addressed. Prior to the procedure, the treatment site was clearly identified and confirmed by the patient. All components of Universal Protocol/PAUSE Rule completed. Keystone Flap Text: The defect edges were debeveled with a #15 scalpel blade. Given the location of the defect, shape of the defect a keystone flap was deemed most appropriate. Using a sterile surgical marker, an appropriate keystone flap was drawn incorporating the defect, outlining the appropriate donor tissue and placing the expected incisions within the relaxed skin tension lines where possible. The area thus outlined was incised deep to adipose tissue with a #15 scalpel blade. The skin margins were undermined to an appropriate distance in all directions around the primary defect and laterally outward around the flap utilizing iris scissors. Referred To Otolaryngology For Closure Text (Leave Blank If You Do Not Want): After obtaining clear surgical margins the patient was sent to otolaryngology for surgical repair. The patient understands they will receive post-surgical care and follow-up from the referring physician's office. Consent (Scalp)/Introductory Paragraph: The rationale for Mohs was explained to the patient and consent was obtained. The risks, benefits and alternatives to therapy were discussed in detail. Specifically, the risks of changes in hair growth pattern secondary to repair, infection, scarring, bleeding, prolonged wound healing, incomplete removal, allergy to anesthesia, nerve injury and recurrence were addressed. Prior to the procedure, the treatment site was clearly identified and confirmed by the patient. All components of Universal Protocol/PAUSE Rule completed. Ear Wedge Repair Text: A wedge excision was completed by carrying down an excision through the full thickness of the ear and cartilage with an inward facing Burow's triangle. The wound was then closed in a layered fashion. Skin Substitute Text: The defect edges were debeveled with a #15 scalpel blade. Given the location of the defect, shape of the defect and the proximity to free margins a skin substitute graft was deemed most appropriate. The graft material was trimmed to fit the size of the defect. The graft was then placed in the primary defect and oriented appropriately. Date Of Previous Biopsy (Optional): 2-20-17 Previous Accession (Optional): [id number] Bcc Histology Text: Beneath an irregular epidermis, there are small nodular masses of basaloid neoplastic cells with nuclear pleomorphism attached to the basal layer and surrounded by a loose fibrous stroma and mild inflammation in the dermis. The findings are typical for a basal cell carcinoma. Post-Care Instructions: I reviewed with the patient in detail post-care instructions. Patient is not to engage in any heavy lifting, exercise, or swimming for the next 14 days. Should the patient develop any fevers, chills, bleeding, severe pain patient will contact the office immediately. Perineural Invasion (For Histology - Be Specific If Possible): absent Consent 3/Introductory Paragraph: I gave the patient a chance to ask questions they had about the procedure. Following this I explained the Mohs procedure and consent was obtained. The risks, benefits and alternatives to therapy were discussed in detail. Specifically, the risks of infection, scarring, bleeding, prolonged wound healing, incomplete removal, allergy to anesthesia, nerve injury and recurrence were addressed. Prior to the procedure, the treatment site was clearly identified and confirmed by the patient. All components of Universal Protocol/PAUSE Rule completed. V-Y Flap Text: The defect edges were debeveled with a #15 scalpel blade. Given the location of the defect, shape of the defect and the proximity to free margins a V-Y flap was deemed most appropriate. Using a sterile surgical marker, an appropriate advancement flap was drawn incorporating the defect and placing the expected incisions within the relaxed skin tension lines where possible. The area thus outlined was incised deep to adipose tissue with a #15 scalpel blade. The skin margins were undermined to an appropriate distance in all directions utilizing iris scissors. Epidermal Autograft Text: The defect edges were debeveled with a #15 scalpel blade. Given the location of the defect, shape of the defect and the proximity to free margins an epidermal autograft was deemed most appropriate. Using a sterile surgical marker, the primary defect shape was transferred to the donor site. The epidermal graft was then harvested. The skin graft was then placed in the primary defect and oriented appropriately. Scc Poorly Differentiated Histology Text: there are strands and nests of pleomorphic cells present in the infiltrative pattern. Mitotic activity is brisk. There is vascular proliferation and acute and chronic inflammation in the dermis. The findings are those of a poorly differentiated squamous cell carcinoma. Secondary Intention Text (Leave Blank If You Do Not Want): The defect will heal with secondary intention. Bcc  Morpheaform/Sclerosing Histology Text: irregular nests of pleomorphic, hyperchromatic basaloid cells with peripheral palisading infiltrate the tissue in a diffuse fashion in association with a mucoid stroma and dense dermal sclerosis. The tumor infiltrates in thin strands and single cells among the sclerotic collagen fibers in a pattern of morpheaform variant of basal cell carcinoma. Closure 3 Information: This tab is for additional flaps and grafts above and beyond our usual structured repairs.  Please note if you enter information here it will not currently bill and you will need to add the billing information manually. Spiral Flap Text: The defect edges were debeveled with a #15 scalpel blade. Given the location of the defect, shape of the defect and the proximity to free margins a spiral flap was deemed most appropriate. Using a sterile surgical marker, an appropriate rotation flap was drawn incorporating the defect and placing the expected incisions within the relaxed skin tension lines where possible. The area thus outlined was incised deep to adipose tissue with a #15 scalpel blade. The skin margins were undermined to an appropriate distance in all directions utilizing iris scissors. W Plasty Text: The lesion was extirpated to the level of the fat with a #15 scalpel blade. Given the location of the defect, shape of the defect and the proximity to free margins a W-plasty was deemed most appropriate for repair. Using a sterile surgical marker, the appropriate transposition arms of the W-plasty were drawn incorporating the defect and placing the expected incisions within the relaxed skin tension lines where possible. The area thus outlined was incised deep to adipose tissue with a #15 scalpel blade. The skin margins were undermined to an appropriate distance in all directions utilizing iris scissors. The opposing transposition arms were then transposed into place in opposite direction and anchored with interrupted buried subcutaneous sutures. Mohs Method Verbiage: An incision at a 45 degree angle following the standard Mohs approach was done and the specimen was harvested as a microscopic controlled layer. Complex Repair And Graft Additional Text (Will Appearing After The Standard Complex Repair Text): The complex repair was not sufficient to completely close the primary defect. The remaining additional defect was repaired with the graft mentioned below. Area M Indication Text: Tumors in this location are included in Area M (cheek, forehead, scalp, neck, jawline and pretibial skin). Mohs surgery is indicated for tumors in these anatomic locations. Consent (Spinal Accessory)/Introductory Paragraph: The rationale for Mohs was explained to the patient and consent was obtained. The risks, benefits and alternatives to therapy were discussed in detail. Specifically, the risks of damage to the spinal accessory nerve, infection, scarring, bleeding, prolonged wound healing, incomplete removal, allergy to anesthesia, and recurrence were addressed. Prior to the procedure, the treatment site was clearly identified and confirmed by the patient. All components of Universal Protocol/PAUSE Rule completed. Cartilage Graft Text: The defect edges were debeveled with a #15 scalpel blade. Given the location of the defect, shape of the defect, the fact the defect involved a full thickness cartilage defect a cartilage graft was deemed most appropriate. An appropriate donor site was identified, cleansed, and anesthetized. The cartilage graft was then harvested and transferred to the recipient site, oriented appropriately and then sutured into place. The secondary defect was then repaired using a primary closure. Location Indication Override (Is Already Calculated Based On Selected Body Location): Area M Hatchet Flap Text: The defect edges were debeveled with a #15 scalpel blade. Given the location of the defect, shape of the defect and the proximity to free margins a hatchet flap was deemed most appropriate. Using a sterile surgical marker, an appropriate hatchet flap was drawn incorporating the defect and placing the expected incisions within the relaxed skin tension lines where possible. The area thus outlined was incised deep to adipose tissue with a #15 scalpel blade. The skin margins were undermined to an appropriate distance in all directions utilizing iris scissors. Consent Type: Consent 1 (Standard) Xenograft Text: The defect edges were debeveled with a #15 scalpel blade. Given the location of the defect, shape of the defect and the proximity to free margins a xenograft was deemed most appropriate. The graft was then trimmed to fit the size of the defect. The graft was then placed in the primary defect and oriented appropriately. Island Pedicle Flap Text: The defect edges were debeveled with a #15 scalpel blade. Given the location of the defect, shape of the defect and the proximity to free margins an island pedicle advancement flap was deemed most appropriate. Using a sterile surgical marker, an appropriate advancement flap was drawn incorporating the defect, outlining the appropriate donor tissue and placing the expected incisions within the relaxed skin tension lines where possible. The area thus outlined was incised deep to adipose tissue with a #15 scalpel blade. The skin margins were undermined to an appropriate distance in all directions around the primary defect and laterally outward around the island pedicle utilizing iris scissors. There was minimal undermining beneath the pedicle flap. Mixed Nodular And Infiltrative Bcc Histology Text: Irregular nests of pleomorphic, hyperchromatic basaloid cells with peripheral palisading infiltrate the tissue in a diffuse fashion in association with sclerotic stroma. Posterior Auricular Interpolation Flap Text: A decision was made to reconstruct the defect utilizing an interpolation axial flap and a staged reconstruction. A telfa template was made of the defect. This telfa template was then used to outline the posterior auricular interpolation flap. The donor area for the pedicle flap was then injected with anesthesia. The flap was excised through the skin and subcutaneous tissue down to the layer of the underlying musculature. The pedicle flap was carefully excised within this deep plane to maintain its blood supply. The edges of the donor site were undermined. The donor site was closed in a primary fashion. The pedicle was then rotated into position and sutured. Once the tube was sutured into place, adequate blood supply was confirmed with blanching and refill. The pedicle was then wrapped with xeroform gauze and dressed appropriately with a telfa and gauze bandage to ensure continued blood supply and protect the attached pedicle. Alar Island Pedicle Flap Text: The defect edges were debeveled with a #15 scalpel blade. Given the location of the defect, shape of the defect and the proximity to the alar rim an island pedicle advancement flap was deemed most appropriate. Using a sterile surgical marker, an appropriate advancement flap was drawn incorporating the defect, outlining the appropriate donor tissue and placing the expected incisions within the nasal ala running parallel to the alar rim. The area thus outlined was incised with a #15 scalpel blade. The skin margins were undermined minimally to an appropriate distance in all directions around the primary defect and laterally outward around the island pedicle utilizing iris scissors. There was minimal undermining beneath the pedicle flap. Consent (Ear)/Introductory Paragraph: The rationale for Mohs was explained to the patient and consent was obtained. The risks, benefits and alternatives to therapy were discussed in detail. Specifically, the risks of ear deformity, infection, scarring, bleeding, prolonged wound healing, incomplete removal, allergy to anesthesia, nerve injury and recurrence were addressed. Prior to the procedure, the treatment site was clearly identified and confirmed by the patient. All components of Universal Protocol/PAUSE Rule completed. Rhombic Flap Text: The defect edges were debeveled with a #15 scalpel blade. Given the location of the defect and the proximity to free margins a rhombic flap was deemed most appropriate. Using a sterile surgical marker, an appropriate rhombic flap was drawn incorporating the defect. The area thus outlined was incised deep to adipose tissue with a #15 scalpel blade. The skin margins were undermined to an appropriate distance in all directions utilizing iris scissors. Mohs Rapid Report Verbiage: The area of clinically evident tumor was marked with skin marking ink and appropriately hatched. The initial incision was made following the Mohs approach through the skin. The specimen was taken to the lab, divided into the necessary number of pieces, chromacoded and processed according to the Mohs protocol. This was repeated in successive stages until a tumor free defect was achieved. Transposition Flap Text: The defect edges were debeveled with a #15 scalpel blade. Given the location of the defect and the proximity to free margins a transposition flap was deemed most appropriate. Using a sterile surgical marker, an appropriate transposition flap was drawn incorporating the defect. The area thus outlined was incised deep to adipose tissue with a #15 scalpel blade. The skin margins were undermined to an appropriate distance in all directions utilizing iris scissors. Melolabial Interpolation Flap Text: A decision was made to reconstruct the defect utilizing an interpolation axial flap and a staged reconstruction. A telfa template was made of the defect. This telfa template was then used to outline the melolabial interpolation flap. The donor area for the pedicle flap was then injected with anesthesia. The flap was excised through the skin and subcutaneous tissue down to the layer of the underlying musculature. The pedicle flap was carefully excised within this deep plane to maintain its blood supply. The edges of the donor site were undermined. The donor site was closed in a primary fashion. The pedicle was then rotated into position and sutured. Once the tube was sutured into place, adequate blood supply was confirmed with blanching and refill. The pedicle was then wrapped with xeroform gauze and dressed appropriately with a telfa and gauze bandage to ensure continued blood supply and protect the attached pedicle. Double Island Pedicle Flap Text: The defect edges were debeveled with a #15 scalpel blade. Given the location of the defect, shape of the defect and the proximity to free margins a double island pedicle advancement flap was deemed most appropriate. Using a sterile surgical marker, an appropriate advancement flap was drawn incorporating the defect, outlining the appropriate donor tissue and placing the expected incisions within the relaxed skin tension lines where possible. The area thus outlined was incised deep to adipose tissue with a #15 scalpel blade. The skin margins were undermined to an appropriate distance in all directions around the primary defect and laterally outward around the island pedicle utilizing iris scissors. There was minimal undermining beneath the pedicle flap. O-T Advancement Flap Text: The defect edges were debeveled with a #15 scalpel blade. Given the location of the defect, shape of the defect and the proximity to free margins an O-T advancement flap was deemed most appropriate. Using a sterile surgical marker, an appropriate advancement flap was drawn incorporating the defect and placing the expected incisions within the relaxed skin tension lines where possible. The area thus outlined was incised deep to adipose tissue with a #15 scalpel blade. The skin margins were undermined to an appropriate distance in all directions utilizing iris scissors. Dressing: dry sterile dressing Epidermal Closure: running Surgeon: Emiliano Ojeda MD V-Y Plasty Text: The defect edges were debeveled with a #15 scalpel blade. Given the location of the defect, shape of the defect and the proximity to free margins an V-Y advancement flap was deemed most appropriate. Using a sterile surgical marker, an appropriate advancement flap was drawn incorporating the defect and placing the expected incisions within the relaxed skin tension lines where possible. The area thus outlined was incised deep to adipose tissue with a #15 scalpel blade. The skin margins were undermined to an appropriate distance in all directions utilizing iris scissors. Manual Repair Warning Statement: We plan on removing the manually selected variable below in favor of our much easier automatic structured text blocks found in the previous tab. We decided to do this to help make the flow better and give you the full power of structured data. Manual selection is never going to be ideal in our platform and I would encourage you to avoid using manual selection from this point on, especially since I will be sunsetting this feature. It is important that you do one of two things with the customized text below. First, you can save all of the text in a word file so you can have it for future reference. Second, transfer the text to the appropriate area in the Library tab. Lastly, if there is a flap or graft type which we do not have you need to let us know right away so I can add it in before the variable is hidden. No need to panic, we plan to give you roughly 6 months to make the change. Bilateral Helical Rim Advancement Flap Text: The defect edges were debeveled with a #15 blade scalpel. Given the location of the defect and the proximity to free margins (helical rim) a bilateral helical rim advancement flap was deemed most appropriate. Using a sterile surgical marker, the appropriate advancement flaps were drawn incorporating the defect and placing the expected incisions between the helical rim and antihelix where possible. The area thus outlined was incised through and through with a #15 scalpel blade. With a skin hook and iris scissors, the flaps were gently and sharply undermined and freed up. Initial Size Of Lesion: 1.1 Deep Sutures: 5-0 Vicryl Localized Dermabrasion Text: The patient was draped in routine manner. Localized dermabrasion using 3 x 17 mm wire brush was performed in routine manner to papillary dermis. This spot dermabrasion is being performed to complete skin cancer reconstruction. It also will eliminate the other sun damaged precancerous cells that are known to be part of the regional effect of a lifetime's worth of sun exposure. This localized dermabrasion is therapeutic and should not be considered cosmetic in any regard. Advancement Flap (Double) Text: The defect edges were debeveled with a #15 scalpel blade. Given the location of the defect and the proximity to free margins a double advancement flap was deemed most appropriate. Using a sterile surgical marker, the appropriate advancement flaps were drawn incorporating the defect and placing the expected incisions within the relaxed skin tension lines where possible. The area thus outlined was incised deep to adipose tissue with a #15 scalpel blade. The skin margins were undermined to an appropriate distance in all directions utilizing iris scissors. Inflammation Suggestive Of Cancer Camouflage Histology Text: There was a dense lymphocytic infiltrate which prevented adequate histologic evaluation of adjacent structures. Repair Performed By Another Provider Text (Leave Blank If You Do Not Want): After obtaining clear surgical margins the defect was repaired by another provider. Cheiloplasty (Less Than 50%) Text: A decision was made to reconstruct the defect with a  cheiloplasty. The defect was undermined extensively. Additional obicularis oris muscle was excised with a 15 blade scalpel. The defect was converted into a full thickness wedge, of less than 50% of the vertical height of the lip, to facilite a better cosmetic result. Small vessels were then tied off with 5-0 monocyrl. The obicularis oris, superficial fascia, adipose and dermis were then reapproximated. After the deeper layers were approximated the epidermis was reapproximated with particular care given to realign the vermilion border. Same Histology In Subsequent Stages Text: The pattern and morphology of the tumor is as described in the first stage. O-Z Plasty Text: The defect edges were debeveled with a #15 scalpel blade. Given the location of the defect, shape of the defect and the proximity to free margins an O-Z plasty (double transposition flap) was deemed most appropriate. Using a sterile surgical marker, the appropriate transposition flaps were drawn incorporating the defect and placing the expected incisions within the relaxed skin tension lines where possible. The area thus outlined was incised deep to adipose tissue with a #15 scalpel blade. The skin margins were undermined to an appropriate distance in all directions utilizing iris scissors. Hemostasis was achieved with electrocautery. The flaps were then transposed into place, one clockwise and the other counterclockwise, and anchored with interrupted buried subcutaneous sutures. Epidermal Closure Graft Donor Site (Optional): none-secondary intention Repair Anesthesia Method: local infiltration Bcc  Nodular Histology Text: there are are irregular nodular masses and strands of basaloid pleomorphic, hyperchromatic neoplastic cells in the tissue, some areas of which show abundant dense sclerotic stroma. The tumor appears to be a nodular basal cell carcinoma, but with areas of sclerosis within it. Estimated Blood Loss (Cc): minimal Ftsg Text: The defect edges were debeveled with a #15 scalpel blade. Given the location of the defect, shape of the defect and the proximity to free margins a full thickness skin graft was deemed most appropriate. Using a sterile surgical marker, the primary defect shape was transferred to the donor site. The area thus outlined was incised deep to adipose tissue with a #15 scalpel blade. The harvested graft was then trimmed of adipose tissue until only dermis and epidermis was left. The skin margins of the secondary defect were undermined to an appropriate distance in all directions utilizing iris scissors. The secondary defect was closed with interrupted buried subcutaneous sutures. The skin edges were then re-apposed with running  sutures. The skin graft was then placed in the primary defect and oriented appropriately.

## 2018-12-30 NOTE — ED PROVIDER NOTE - PHYSICAL EXAMINATION
NAD, Tachycardia, Afebrile, Lungs clear, + Mild RUQ tender, Lower abd soft, non tender, no CVA tender. + Red dry blood around anus area. Neuro- intact.

## 2018-12-30 NOTE — CONSULT NOTE ADULT - SUBJECTIVE AND OBJECTIVE BOX
Patient is a 78y old  Male who presents with a chief complaint of GIB (30 Dec 2018 18:13)      HPI:  Pt is a 79yo male with PMHx of Atrial fibrillation on Coumadin and Digoxin, HTN, HLD, DM, BPH, Asthma and gout presents to ED c/o rectal bleeding (bright red) since this morning. Pt stated he noticed watery red rectal bleeding twice since this morning and intermittent mild nausea. Pt also reported he felt mild dizziness and right upper abd discomfort. Last INR check was 3weeks ago and was told 'fine'. Denies gum bleeding or hematuria. Denies fever, chills or cough/ congestion. Denies V/D. Denies CP/SOB or back pain. Denies sensory changes or weakness to extremities. Denies urinary problems. (30 Dec 2018 18:13)      PAST MEDICAL & SURGICAL HISTORY:  Alzheimer's dementia  AF (atrial fibrillation)  DM (diabetes mellitus)  HLD (hyperlipidemia)  GI bleed  GERD (gastroesophageal reflux disease)  Gout  BPH (benign prostatic hyperplasia)  HTN (hypertension)  S/P hernia repair  S/P cataract surgery  S/P knee replacement, bilateral      MEDICATIONS  (STANDING):  allopurinol 300 milliGRAM(s) Oral at bedtime  atorvastatin 20 milliGRAM(s) Oral at bedtime  carvedilol 25 milliGRAM(s) Oral every 12 hours  digoxin     Tablet 0.25 milliGRAM(s) Oral daily  losartan 50 milliGRAM(s) Oral two times a day  montelukast 10 milliGRAM(s) Oral at bedtime  pantoprazole Infusion 8 mG/Hr (10 mL/Hr) IV Continuous <Continuous>  tamsulosin 0.4 milliGRAM(s) Oral at bedtime      Allergies    penicillin (Unknown)    Intolerances        SOCIAL HISTORY:  Denies ETOh,Smoking,     FAMILY HISTORY:      REVIEW OF SYSTEMS:    CONSTITUTIONAL: No weakness, fevers or chills  EYES/ENT: No visual changes;  No vertigo or throat pain   NECK: No pain or stiffness  RESPIRATORY: No cough, wheezing, hemoptysis; No shortness of breath  CARDIOVASCULAR: No chest pain or palpitations  GASTROINTESTINAL: No abdominal or epigastric pain. No nausea, vomiting, or hematemesis; No diarrhea or constipation. No melena or hematochezia.  GENITOURINARY: No dysuria, frequency or hematuria  NEUROLOGICAL: No numbness or weakness  SKIN: No itching, burning, rashes, or lesions   All other review of systems is negative unless indicated above.    VITAL:  T(C): , Max: 36.9 (12-30-18 @ 15:40)  T(F): , Max: 98.4 (12-30-18 @ 15:40)  HR: 99 (12-30-18 @ 15:40)  BP: 140/90 (12-30-18 @ 15:40)  BP(mean): --  RR: 16 (12-30-18 @ 15:40)  SpO2: 97% (12-30-18 @ 15:40)  Wt(kg): --    I and O's:    Height (cm): 170.18 (12-30 @ 11:53)  Weight (kg): 90.7 (12-30 @ 11:53)  BMI (kg/m2): 31.3 (12-30 @ 11:53)  BSA (m2): 2.02 (12-30 @ 11:53)    PHYSICAL EXAM:    Constitutional: NAD  HEENT: PERRLA,   Neck: No JVD  Respiratory: CTA B/L  Cardiovascular: S1 and S2  Gastrointestinal: BS+, soft, NT/ND  Extremities: No peripheral edema  Neurological: A/O x 3, no focal deficits  Psychiatric: Normal mood, normal affect  : No Box  Skin: No rashes  Access: Not applicable  Back: No CVA tenderness    LABS:                        14.7   12.1  )-----------( 184      ( 30 Dec 2018 13:43 )             43.8     12-30    135  |  99  |  17  ----------------------------<  208<H>  4.6   |  26  |  0.66    Ca    9.2      30 Dec 2018 13:43    TPro  6.9  /  Alb  3.7  /  TBili  0.6  /  DBili  x   /  AST  20  /  ALT  13  /  AlkPhos  89  12-30          RADIOLOGY & ADDITIONAL STUDIES:

## 2018-12-30 NOTE — H&P ADULT - NSHPLABSRESULTS_GEN_ALL_CORE
14.7   12.1  )-----------( 184      ( 30 Dec 2018 13:43 )             43.8                   135  |  99  |  17  ----------------------------<  208<H>  4.6   |  26  |  0.66    Ca    9.2      30 Dec 2018 13:43    TPro  6.9  /  Alb  3.7  /  TBili  0.6  /  DBili  x   /  AST  20  /  ALT  13  /  AlkPhos  89      PT/INR - ( 30 Dec 2018 13:43 )   PT: 38.4 sec;   INR: 3.25 ratio         PTT - ( 30 Dec 2018 13:43 )  PTT:52.1 sec                   Urinalysis Basic - ( 30 Dec 2018 17:06 )    Color: Yellow / Appearance: Clear / S.017 / pH: x  Gluc: x / Ketone: Negative  / Bili: Negative / Urobili: Negative   Blood: x / Protein: Trace / Nitrite: Negative   Leuk Esterase: Negative / RBC: 6 /hpf / WBC 1 /hpf   Sq Epi: x / Non Sq Epi: 0 /hpf / Bacteria: Negative    < from: CT Abdomen and Pelvis w/ Oral Cont and w/ IV Cont (18 @ 16:26) >    IMPRESSION:     Motion limited examination.     A heterogeneous right renal mass measuring 0.8 cm concerning for neoplasm.    A hypodense mass in the mid abdomen adjacent to second portion of   duodenum and uncinate process of the pancreas with focal hyperdense   lesion. Recommend further evaluation with MR study.

## 2018-12-30 NOTE — ED PROVIDER NOTE - OBJECTIVE STATEMENT
77yo male with PMHx of Atrial fibrillation on Coumadin and Digoxin, HTN, HLD, DM, BPH, Asthma and gout presents to ED c/o rectal bleeding (bright red) since this morning. Pt stated he noticed watery red rectal bleeding twice since this morning and intermittent mild nausea. Pt also reported he felt mild dizziness and right upper abd discomfort. Last INR check was 3weeks ago and was told 'fine'. Denies gum bleeding or hematuria. Denies fever, chills or cough/ congestion. Denies V/D. Denies CP/SOB or back pain. Denies sensory changes or weakness to extremities. Denies urinary problems.

## 2018-12-30 NOTE — H&P ADULT - ASSESSMENT
Pt is a 79yo male with PMHx of Atrial fibrillation on Coumadin and Digoxin, HTN, HLD, DM, BPH, Asthma and gout presents to ED c/o rectal bleeding (bright red) since this morning.

## 2018-12-30 NOTE — ED PROVIDER NOTE - MEDICAL DECISION MAKING DETAILS
Rashi: 78 year old male with afib on coumadin, htn, presents to the ER with rectal bleeding since this am. will get labs, cbc, likely admit, reassess.

## 2018-12-30 NOTE — CONSULT NOTE ADULT - ASSESSMENT
pt looks younger than stated age. last colon 10 years ago. ? cyst near pancreas will review with radiology and do mri.  will need colon +/- eus, but will need inr down. follow hgb.  clear liquids mri, will speak with family, further recommenatison in am.  dobut that dudodenal/ pancreatic lesion is cultprit

## 2018-12-30 NOTE — H&P ADULT - HISTORY OF PRESENT ILLNESS
Pt is a 79yo male with PMHx of Atrial fibrillation on Coumadin and Digoxin, HTN, HLD, DM, BPH, Asthma and gout presents to ED c/o rectal bleeding (bright red) since this morning. Pt stated he noticed watery red rectal bleeding twice since this morning and intermittent mild nausea. Pt also reported he felt mild dizziness and right upper abd discomfort. Last INR check was 3weeks ago and was told 'fine'. Denies gum bleeding or hematuria. Denies fever, chills or cough/ congestion. Denies V/D. Denies CP/SOB or back pain. Denies sensory changes or weakness to extremities. Denies urinary problems.

## 2018-12-30 NOTE — ED ADULT NURSE NOTE - OBJECTIVE STATEMENT
77 yo male pmh HTN, a.fib- on coumadin, HLD, asthma, gout, and BPH presenting to ED accompanied by family c/o rectal bleeding x this am with small amounts of bright red clots passed. pt describes the bleeding as bright red in nature with a total of 2 episodes of bright red bloody stools. denies ever having rectal bleed in the passed. no nausea, vomiting, abdominal pain, weakness, cp, or sob present. denies any lightheadedness. labs and type and screen obtained. VS stable. safety and fall precautions maintained. call bell at the bedside and within reach.

## 2018-12-30 NOTE — H&P ADULT - NSHPPHYSICALEXAM_GEN_ALL_CORE
Vital Signs Last 24 Hrs  T(C): 36.9 (30 Dec 2018 15:40), Max: 36.9 (30 Dec 2018 15:40)  T(F): 98.4 (30 Dec 2018 15:40), Max: 98.4 (30 Dec 2018 15:40)  HR: 99 (30 Dec 2018 15:40) (99 - 109)  BP: 140/90 (30 Dec 2018 15:40) (138/98 - 140/90)  BP(mean): --  RR: 16 (30 Dec 2018 15:40) (16 - 18)  SpO2: 97% (30 Dec 2018 15:40) (97% - 97%)    Appearance: Normal	  HEENT:   Normal oral mucosa, PERRL, EOMI	  Lymphatic: No lymphadenopathy , no edema  Cardiovascular: Normal S1 S2, irregularly irregular   Respiratory: Lungs clear to auscultation, normal effort 	  Gastrointestinal:  mild tenderness on RLQ, no rebound 	  Skin: No rashes, No ecchymoses, No cyanosis, warm to touch  Musculoskeletal: Normal range of motion, normal strength  Psychiatry:  Mood & affect appropriate  Ext: No edema

## 2018-12-30 NOTE — H&P ADULT - PMH
AF (atrial fibrillation)    Alzheimer's dementia    BPH (benign prostatic hyperplasia)    DM (diabetes mellitus)    GERD (gastroesophageal reflux disease)    GI bleed    Gout    HLD (hyperlipidemia)    HTN (hypertension)

## 2018-12-30 NOTE — ED ADULT NURSE NOTE - NSIMPLEMENTINTERV_GEN_ALL_ED
Implemented All Fall with Harm Risk Interventions:  [place] to call system. Call bell, personal items and telephone within reach. Instruct patient to call for assistance. Room bathroom lighting operational. Non-slip footwear when patient is off stretcher. Physically safe environment: no spills, clutter or unnecessary equipment. Stretcher in lowest position, wheels locked, appropriate side rails in place. Provide visual cue, wrist band, yellow gown, etc. Monitor gait and stability. Monitor for mental status changes and reorient to person, place, and time. Review medications for side effects contributing to fall risk. Reinforce activity limits and safety measures with patient and family. Provide visual clues: red socks.

## 2018-12-31 ENCOUNTER — TRANSCRIPTION ENCOUNTER (OUTPATIENT)
Age: 78
End: 2018-12-31

## 2018-12-31 LAB
ALBUMIN SERPL ELPH-MCNC: 3.6 G/DL — SIGNIFICANT CHANGE UP (ref 3.3–5)
ALP SERPL-CCNC: 81 U/L — SIGNIFICANT CHANGE UP (ref 40–120)
ALT FLD-CCNC: 9 U/L — LOW (ref 10–45)
ANION GAP SERPL CALC-SCNC: 11 MMOL/L — SIGNIFICANT CHANGE UP (ref 5–17)
APTT BLD: 44.7 SEC — HIGH (ref 27.5–36.3)
AST SERPL-CCNC: 17 U/L — SIGNIFICANT CHANGE UP (ref 10–40)
BILIRUB SERPL-MCNC: 1 MG/DL — SIGNIFICANT CHANGE UP (ref 0.2–1.2)
BUN SERPL-MCNC: 13 MG/DL — SIGNIFICANT CHANGE UP (ref 7–23)
CALCIUM SERPL-MCNC: 8.8 MG/DL — SIGNIFICANT CHANGE UP (ref 8.4–10.5)
CHLORIDE SERPL-SCNC: 102 MMOL/L — SIGNIFICANT CHANGE UP (ref 96–108)
CHOLEST SERPL-MCNC: 114 MG/DL — SIGNIFICANT CHANGE UP (ref 10–199)
CO2 SERPL-SCNC: 26 MMOL/L — SIGNIFICANT CHANGE UP (ref 22–31)
CREAT SERPL-MCNC: 0.7 MG/DL — SIGNIFICANT CHANGE UP (ref 0.5–1.3)
GLUCOSE SERPL-MCNC: 150 MG/DL — HIGH (ref 70–99)
HCT VFR BLD CALC: 39 % — SIGNIFICANT CHANGE UP (ref 39–50)
HDLC SERPL-MCNC: 40 MG/DL — SIGNIFICANT CHANGE UP
HGB BLD-MCNC: 13.4 G/DL — SIGNIFICANT CHANGE UP (ref 13–17)
INR BLD: 2.99 RATIO — HIGH (ref 0.88–1.16)
LIPID PNL WITH DIRECT LDL SERPL: 52 MG/DL — SIGNIFICANT CHANGE UP
MCHC RBC-ENTMCNC: 31.5 PG — SIGNIFICANT CHANGE UP (ref 27–34)
MCHC RBC-ENTMCNC: 34.4 GM/DL — SIGNIFICANT CHANGE UP (ref 32–36)
MCV RBC AUTO: 91.8 FL — SIGNIFICANT CHANGE UP (ref 80–100)
PLATELET # BLD AUTO: 170 K/UL — SIGNIFICANT CHANGE UP (ref 150–400)
POTASSIUM SERPL-MCNC: 4 MMOL/L — SIGNIFICANT CHANGE UP (ref 3.5–5.3)
POTASSIUM SERPL-SCNC: 4 MMOL/L — SIGNIFICANT CHANGE UP (ref 3.5–5.3)
PROT SERPL-MCNC: 6.3 G/DL — SIGNIFICANT CHANGE UP (ref 6–8.3)
PROTHROM AB SERPL-ACNC: 34.7 SEC — HIGH (ref 10–13.1)
RBC # BLD: 4.25 M/UL — SIGNIFICANT CHANGE UP (ref 4.2–5.8)
RBC # FLD: 14.1 % — SIGNIFICANT CHANGE UP (ref 10.3–14.5)
SODIUM SERPL-SCNC: 139 MMOL/L — SIGNIFICANT CHANGE UP (ref 135–145)
TOTAL CHOLESTEROL/HDL RATIO MEASUREMENT: 2.9 RATIO — LOW (ref 3.4–9.6)
TRIGL SERPL-MCNC: 111 MG/DL — SIGNIFICANT CHANGE UP (ref 10–149)
TSH SERPL-MCNC: 1.25 UIU/ML — SIGNIFICANT CHANGE UP (ref 0.27–4.2)
WBC # BLD: 7.39 K/UL — SIGNIFICANT CHANGE UP (ref 3.8–10.5)
WBC # FLD AUTO: 7.39 K/UL — SIGNIFICANT CHANGE UP (ref 3.8–10.5)

## 2018-12-31 PROCEDURE — 76770 US EXAM ABDO BACK WALL COMP: CPT | Mod: 26

## 2018-12-31 RX ORDER — SOD SULF/SODIUM/NAHCO3/KCL/PEG
4000 SOLUTION, RECONSTITUTED, ORAL ORAL ONCE
Qty: 0 | Refills: 0 | Status: COMPLETED | OUTPATIENT
Start: 2018-12-31 | End: 2018-12-31

## 2018-12-31 RX ADMIN — CARVEDILOL PHOSPHATE 25 MILLIGRAM(S): 80 CAPSULE, EXTENDED RELEASE ORAL at 21:17

## 2018-12-31 RX ADMIN — TAMSULOSIN HYDROCHLORIDE 0.4 MILLIGRAM(S): 0.4 CAPSULE ORAL at 21:17

## 2018-12-31 RX ADMIN — Medication 0.25 MILLIGRAM(S): at 05:59

## 2018-12-31 RX ADMIN — Medication 300 MILLIGRAM(S): at 21:17

## 2018-12-31 RX ADMIN — PANTOPRAZOLE SODIUM 10 MG/HR: 20 TABLET, DELAYED RELEASE ORAL at 18:06

## 2018-12-31 RX ADMIN — MONTELUKAST 10 MILLIGRAM(S): 4 TABLET, CHEWABLE ORAL at 21:17

## 2018-12-31 RX ADMIN — PANTOPRAZOLE SODIUM 10 MG/HR: 20 TABLET, DELAYED RELEASE ORAL at 05:58

## 2018-12-31 RX ADMIN — CARVEDILOL PHOSPHATE 25 MILLIGRAM(S): 80 CAPSULE, EXTENDED RELEASE ORAL at 12:50

## 2018-12-31 RX ADMIN — LOSARTAN POTASSIUM 50 MILLIGRAM(S): 100 TABLET, FILM COATED ORAL at 05:59

## 2018-12-31 RX ADMIN — ATORVASTATIN CALCIUM 20 MILLIGRAM(S): 80 TABLET, FILM COATED ORAL at 21:17

## 2018-12-31 RX ADMIN — LOSARTAN POTASSIUM 50 MILLIGRAM(S): 100 TABLET, FILM COATED ORAL at 18:04

## 2018-12-31 RX ADMIN — Medication 4000 MILLILITER(S): at 18:22

## 2018-12-31 NOTE — DISCHARGE NOTE ADULT - MEDICATION SUMMARY - MEDICATIONS TO STOP TAKING
I will STOP taking the medications listed below when I get home from the hospital:    metoprolol succinate 50 mg oral tablet, extended release  -- 1 tab(s) by mouth once a day    warfarin 4 mg oral tablet  -- 1 tab(s) by mouth once a day (in the evening)

## 2018-12-31 NOTE — PROGRESS NOTE ADULT - PROBLEM SELECTOR PLAN 1
GI evaluation appreciated   plan for colonoscopy on Wednesday   IV hydration   MRA abd   active type and screen   CT Abd/pelv noted.? mass. also 0.8 cm renal mass   protonix drip   liquid diet  Renal eval appreciated

## 2018-12-31 NOTE — DISCHARGE NOTE ADULT - OTHER SIGNIFICANT FINDINGS
1/3/19: Ct Scan - Abdomen / Pelvis: IMPRESSION:   Hematoma at the uncinate process/anterior to the third portion of the duodenum without evidence of active bleeding or associated pseudoaneurysm.  A 5.1 cm solid, enhancing right renal mass, renal cell carcinoma until proven otherwise.    1/1/19: MRI abdomen w/ contrast: IMPRESSION:   A 4.9 cm right lower renal pole mass compatible with RCC.  A 5.5 cm lesion in the uncinate process of the pancreas suggestive of hematoma. An enhancing or hyperdense component seen on the recent CT is no longer visualized.

## 2018-12-31 NOTE — DISCHARGE NOTE ADULT - HOSPITAL COURSE
Pt is a 77yo male with PMHx of Atrial fibrillation on Coumadin and Digoxin, HTN, HLD, DM, BPH, Asthma and gout presents to ED c/o rectal bleeding (bright red) .    GI work - up - EGD with "normal" esophagus , Colonoscopy with polyp removal, no active bleeding seen. Recommending capsule study as outpatient,  # pancreatic hematoma seen on CT - Coumadin (was on for Atrial Fibrillation) on hold - all risk and benefits of holding AC discussed with patient  CT Abd/pelv also noted 3.8 cm renal mass   F/U after discharge with GI, Urology and card   outpatient f/u for further urologic W/U and management  case was discussed in details with family and the patient at the bedside for necessity to F/U with urology for acute management.

## 2018-12-31 NOTE — DISCHARGE NOTE ADULT - PLAN OF CARE
resolved There are 2 common types of GI Bleed, Upper GI Bleed and Lower GI Bleed.  Upper GI Bleed affects the esophagus, stomach, and first part of the small intestine. Lower GI Bleed affects the colon and rectum.  Upper GI Bleed signs and symptoms to notify your Health Care Provider are vomiting blood, or coffee ground vomitus, and bowel movements that look like black tar.  Lower GI Bleed signs and symptoms to notify your health care provider are bright red bloody bowel movements.   Take your medications as prescribed by your Gastroenterologist.  If you have had an Endoscopy or Colonoscopy, follow up with your Gastroenterologist for Pathology results.  Avoid NSAIDs unless your Health Care Provider tells you that it is ok (Aspirin, Ibuprofen, Advil, Motrin, Aleve).  Follow up with your Gastroenterologist within 1-2 weeks of discharge. YOU MUST FOLLOW UP WITH THE KIDNEY DOCTOR for further work - up and treatment rate control Atrial fibrillation is the most common heart rhythm problem & has the risk of stroke & heart attack  It helps if you control your blood pressure, not drink more than 1-2 alcohol drinks per day, cut down on caffeine, getting treatment for over active thyroid gland, & getting exercise  Call your doctor if you feel your heart racing or beating unusually, chest tightness or pain, lightheaded, faint, shortness of breath especially with exercise  It is important to take your heart medication as prescribed  YOUR BLOOD THINNER (ANTICOAGULATION - COUMADIN) is on hold - do not resume until you are told to by your physicians - you need further testing and possible surgical intervention disease management HgA1C this admission 7.1  Make sure you get your HgA1c checked every three months.  If you take oral diabetes medications, check your blood glucose two times a day.  If you take insulin, check your blood glucose before meals and at bedtime.  It's important not to skip any meals.  Keep a log of your blood glucose results and always take it with you to your doctor appointments.  Keep a list of your current medications including injectables and over the counter medications and bring this medication list with you to all your doctor appointments.  If you have not seen your ophthalmologist this year call for appointment.  Check your feet daily for redness, sores, or openings. Do not self treat. If no improvement in two days call your primary care physician for an appointment.  Low blood sugar (hypoglycemia) is a blood sugar below 70mg/dl. Check your blood sugar if you feel signs/symptoms of hypoglycemia. If your blood sugar is below 70 take 15 grams of carbohydrates (ex 4 oz of apple juice, 3-4 glucose tablets, or 4-6 oz of regular soda) wait 15 minutes and repeat blood sugar to make sure it comes up above 70.  If your blood sugar is above 70 and you are due for a meal, have a meal.  If you are not due for a meal have a snack.  This snack helps keeps your blood sugar at a safe range.

## 2018-12-31 NOTE — DISCHARGE NOTE ADULT - MEDICATION SUMMARY - MEDICATIONS TO TAKE
I will START or STAY ON the medications listed below when I get home from the hospital:    losartan 50 mg oral tablet  -- 1 tab(s) by mouth 2 times a day  -- Indication: For cardiovascular disease    tamsulosin 0.4 mg oral capsule  -- 1 cap(s) by mouth once a day (at bedtime)  -- Indication: For BPH (benign prostatic hyperplasia)    digoxin 250 mcg (0.25 mg) oral tablet  -- 1 tab(s) by mouth once a day  -- Indication: For AF (atrial fibrillation)    metFORMIN 500 mg oral tablet, extended release  -- 1 tab(s) by mouth once a day  -- Indication: For DM (diabetes mellitus)    allopurinol 300 mg oral tablet  -- 1 tab(s) by mouth once a day  -- Indication: For Gout    lovastatin 10 mg oral tablet  -- 1 tab(s) by mouth once a day  -- Indication: For HLD (hyperlipidemia)    carvedilol 25 mg oral tablet  -- 1 tab(s) by mouth every 12 hours  -- Indication: For cardiovascular disease    montelukast 10 mg oral tablet  -- 1 tab(s) by mouth once a day  -- Indication: For shortness of breath    pantoprazole 40 mg oral delayed release tablet  -- 1 tab(s) by mouth once a day (before a meal)  -- Indication: For GI bleeding

## 2018-12-31 NOTE — DISCHARGE NOTE ADULT - ADDITIONAL INSTRUCTIONS
IT IS VERY IMPORTANT YOU MAKE APPOINTMENTS TO FOLLOW UP WITH YOUR PHYSICIAN(S) INCLUDING THE KIDNEY (RENAL) AND STOMACH (GASTROENTEROLOGIST) - DO NOT TAKE YOUR COUMADIN UNTIL YOU FOLLOW UP.  MAKE AN APPOINTMENT WITH THE UROLOGIST: Dr. Mason at [facility] for Urology  ASAP  Bring all discharge paperwork including discharge medication list to follow up appointments

## 2018-12-31 NOTE — CONSULT NOTE ADULT - SUBJECTIVE AND OBJECTIVE BOX
CHIEF COMPLAINT:Patient is a 78y old  Male who presents with a chief complaint of GIB (31 Dec 2018 09:50)      HISTORY OF PRESENT ILLNESS:    This is a pleasant 78 male with history as below know to me from office admitted with rectal bleed not associated with BM   No chest pain, dyspnea, palpitation, or dizziness.     PAST MEDICAL & SURGICAL HISTORY:  Alzheimer's dementia  AF (atrial fibrillation)  DM (diabetes mellitus)  HLD (hyperlipidemia)  GI bleed  GERD (gastroesophageal reflux disease)  Gout  BPH (benign prostatic hyperplasia)  HTN (hypertension)  S/P hernia repair  S/P cataract surgery  S/P knee replacement, bilateral          MEDICATIONS:  carvedilol 25 milliGRAM(s) Oral every 12 hours  digoxin     Tablet 0.25 milliGRAM(s) Oral daily  losartan 50 milliGRAM(s) Oral two times a day  tamsulosin 0.4 milliGRAM(s) Oral at bedtime      montelukast 10 milliGRAM(s) Oral at bedtime      pantoprazole Infusion 8 mG/Hr IV Continuous <Continuous>    allopurinol 300 milliGRAM(s) Oral at bedtime  atorvastatin 20 milliGRAM(s) Oral at bedtime        FAMILY HISTORY:      Non-contributory    SOCIAL HISTORY:    No tobacco, drugs or etoh    Allergies    penicillin (Unknown)    Intolerances    	    REVIEW OF SYSTEMS:  as above  The rest of the 14 points ROS reviewed and except above they are unremarkable.        PHYSICAL EXAM:  T(C): 36.7 (12-31-18 @ 11:09), Max: 37 (12-30-18 @ 20:46)  HR: 90 (12-31-18 @ 11:09) (90 - 113)  BP: 132/94 (12-31-18 @ 11:09) (110/80 - 140/90)  RR: 18 (12-31-18 @ 11:09) (16 - 18)  SpO2: 96% (12-31-18 @ 11:09) (96% - 98%)  Wt(kg): --  I&O's Summary    30 Dec 2018 07:01  -  31 Dec 2018 07:00  --------------------------------------------------------  IN: 55 mL / OUT: 0 mL / NET: 55 mL        Appearance: Normal	  HEENT:   no gross abnormality   Cardiovascular: Normal S1 S2,    Murmur:   Neck: JVP normal  Respiratory: Lungs clear   Gastrointestinal:  Soft, Non-tender  Skin: normal   Neuro: No gross deficits.   Psychiatry:  Mood & affect flat  Ext: No edema    LABS/DATA:    TELEMETRY: 	    ECG:  	   	  CARDIAC MARKERS:                                      13.4   7.39  )-----------( 170      ( 31 Dec 2018 08:10 )             39.0     12-31    139  |  102  |  13  ----------------------------<  150<H>  4.0   |  26  |  0.70    Ca    8.8      31 Dec 2018 06:49    TPro  6.3  /  Alb  3.6  /  TBili  1.0  /  DBili  x   /  AST  17  /  ALT  9<L>  /  AlkPhos  81  12-31    proBNP:   Lipid Profile:   HgA1c:   TSH: Thyroid Stimulating Hormone, Serum: 1.25 uIU/mL (12-31 @ 08:04)

## 2018-12-31 NOTE — DISCHARGE NOTE ADULT - PATIENT PORTAL LINK FT
You can access the [facility] Patient Portal, offered by [facility], by registering with the following website: [email or web address]

## 2018-12-31 NOTE — CONSULT NOTE ADULT - ASSESSMENT
PAF  HR stable   cont current meds  a/c on hold given rectal bleed    GIB  plan for endoscopy as per GI     HTN  stable

## 2018-12-31 NOTE — DISCHARGE NOTE ADULT - CARE PROVIDERS DIRECT ADDRESSES
[email or web address],DirectAddress_Unknown,DirectAddress_Unknown

## 2018-12-31 NOTE — DISCHARGE NOTE ADULT - CARE PROVIDER_API CALL
Juanjose Mason), Urology  [address] [address]  Phone: [phone]  Fax: [phone]    Kylah Amaro), Internal Medicine; Nephrology  [address] [address]  Phone: [phone]  Fax: [phone]    Juan Antonio Garces), Cardiovascular Disease; Internal Medicine  [address] [address]  Phone: [phone]  Fax: [phone]

## 2018-12-31 NOTE — DISCHARGE NOTE ADULT - NS AS DC FOLLOWUP STROKE INST
Coumadin/Warfarin/Influenza vaccination (VIS Pub Date: August 7, 2015)/Smoking Cessation Influenza vaccination (VIS Pub Date: August 7, 2015)/Smoking Cessation

## 2018-12-31 NOTE — PROGRESS NOTE ADULT - PROBLEM SELECTOR PLAN 3
suprather NR  Hold Coumadin, restart after bleeding resolved if clear y GI and Card   Card evaluation Dr Walden appreciated   restart Dig, Carvedilol and losartan   monitor vitals

## 2018-12-31 NOTE — DISCHARGE NOTE ADULT - CARE PLAN
Principal Discharge DX:	GI bleeding  Goal:	resolved  Assessment and plan of treatment:	There are 2 common types of GI Bleed, Upper GI Bleed and Lower GI Bleed.  Upper GI Bleed affects the esophagus, stomach, and first part of the small intestine. Lower GI Bleed affects the colon and rectum.  Upper GI Bleed signs and symptoms to notify your Health Care Provider are vomiting blood, or coffee ground vomitus, and bowel movements that look like black tar.  Lower GI Bleed signs and symptoms to notify your health care provider are bright red bloody bowel movements.   Take your medications as prescribed by your Gastroenterologist.  If you have had an Endoscopy or Colonoscopy, follow up with your Gastroenterologist for Pathology results.  Avoid NSAIDs unless your Health Care Provider tells you that it is ok (Aspirin, Ibuprofen, Advil, Motrin, Aleve).  Follow up with your Gastroenterologist within 1-2 weeks of discharge.  Secondary Diagnosis:	Renal mass  Assessment and plan of treatment:	YOU MUST FOLLOW UP WITH THE KIDNEY DOCTOR for further work - up and treatment  Secondary Diagnosis:	AF (atrial fibrillation)  Goal:	rate control  Assessment and plan of treatment:	Atrial fibrillation is the most common heart rhythm problem & has the risk of stroke & heart attack  It helps if you control your blood pressure, not drink more than 1-2 alcohol drinks per day, cut down on caffeine, getting treatment for over active thyroid gland, & getting exercise  Call your doctor if you feel your heart racing or beating unusually, chest tightness or pain, lightheaded, faint, shortness of breath especially with exercise  It is important to take your heart medication as prescribed  YOUR BLOOD THINNER (ANTICOAGULATION - COUMADIN) is on hold - do not resume until you are told to by your physicians - you need further testing and possible surgical intervention  Secondary Diagnosis:	DM (diabetes mellitus)  Goal:	disease management  Assessment and plan of treatment:	HgA1C this admission 7.1  Make sure you get your HgA1c checked every three months.  If you take oral diabetes medications, check your blood glucose two times a day.  If you take insulin, check your blood glucose before meals and at bedtime.  It's important not to skip any meals.  Keep a log of your blood glucose results and always take it with you to your doctor appointments.  Keep a list of your current medications including injectables and over the counter medications and bring this medication list with you to all your doctor appointments.  If you have not seen your ophthalmologist this year call for appointment.  Check your feet daily for redness, sores, or openings. Do not self treat. If no improvement in two days call your primary care physician for an appointment.  Low blood sugar (hypoglycemia) is a blood sugar below 70mg/dl. Check your blood sugar if you feel signs/symptoms of hypoglycemia. If your blood sugar is below 70 take 15 grams of carbohydrates (ex 4 oz of apple juice, 3-4 glucose tablets, or 4-6 oz of regular soda) wait 15 minutes and repeat blood sugar to make sure it comes up above 70.  If your blood sugar is above 70 and you are due for a meal, have a meal.  If you are not due for a meal have a snack.  This snack helps keeps your blood sugar at a safe range.

## 2018-12-31 NOTE — PROGRESS NOTE ADULT - SUBJECTIVE AND OBJECTIVE BOX
Subjective: Patient seen and examined. No new events except as noted.   doing better today   one episode of dark stool     REVIEW OF SYSTEMS:    CONSTITUTIONAL: No weakness, fevers or chills  EYES/ENT: No visual changes;  No vertigo or throat pain   NECK: No pain or stiffness  RESPIRATORY: No cough, wheezing, hemoptysis; No shortness of breath  CARDIOVASCULAR: No chest pain or palpitations  GASTROINTESTINAL: No abdominal or epigastric pain. No nausea, vomiting, or hematemesis; No diarrhea or constipation. No melena or hematochezia.  GENITOURINARY: No dysuria, frequency or hematuria  NEUROLOGICAL: No numbness or weakness  SKIN: No itching, burning, rashes, or lesions   All other review of systems is negative unless indicated above.    MEDICATIONS:  MEDICATIONS  (STANDING):  allopurinol 300 milliGRAM(s) Oral at bedtime  atorvastatin 20 milliGRAM(s) Oral at bedtime  carvedilol 25 milliGRAM(s) Oral every 12 hours  digoxin     Tablet 0.25 milliGRAM(s) Oral daily  losartan 50 milliGRAM(s) Oral two times a day  montelukast 10 milliGRAM(s) Oral at bedtime  pantoprazole Infusion 8 mG/Hr (10 mL/Hr) IV Continuous <Continuous>  tamsulosin 0.4 milliGRAM(s) Oral at bedtime      PHYSICAL EXAM:  T(C): 36.7 (18 @ 11:09), Max: 37 (18 @ 20:46)  HR: 89 (18 @ 18:02) (89 - 113)  BP: 117/82 (18 @ 18:02) (110/80 - 134/96)  RR: 18 (18 @ 18:02) (16 - 18)  SpO2: 98% (18 @ 18:02) (96% - 98%)  Wt(kg): --  I&O's Summary    30 Dec 2018 07:  -  31 Dec 2018 07:00  --------------------------------------------------------  IN: 55 mL / OUT: 0 mL / NET: 55 mL    31 Dec 2018 07:01  -  31 Dec 2018 18:27  --------------------------------------------------------  IN: 300 mL / OUT: 0 mL / NET: 300 mL          Appearance: Normal	  HEENT:   Normal oral mucosa, PERRL, EOMI	  Lymphatic: No lymphadenopathy , no edema  Cardiovascular: S1 S2, irregularly irregular   Respiratory: Lungs clear to auscultation, normal effort 	  Gastrointestinal:  Soft, Non-tender, + BS	  Skin: No rashes, No ecchymoses, No cyanosis, warm to touch  Musculoskeletal: Normal range of motion, normal strength  Psychiatry:  Mood & affect appropriate  Ext: No edema      All labs, Imaging and EKGs personally reviewed                           13.4   7.39  )-----------( 170      ( 31 Dec 2018 08:10 )             39.0                   139  |  102  |  13  ----------------------------<  150<H>  4.0   |  26  |  0.70    Ca    8.8      31 Dec 2018 06:49    TPro  6.3  /  Alb  3.6  /  TBili  1.0  /  DBili  x   /  AST  17  /  ALT  9<L>  /  AlkPhos  81      PT/INR - ( 31 Dec 2018 08:04 )   PT: 34.7 sec;   INR: 2.99 ratio         PTT - ( 31 Dec 2018 08:04 )  PTT:44.7 sec                   Urinalysis Basic - ( 30 Dec 2018 17:06 )    Color: Yellow / Appearance: Clear / S.017 / pH: x  Gluc: x / Ketone: Negative  / Bili: Negative / Urobili: Negative   Blood: x / Protein: Trace / Nitrite: Negative   Leuk Esterase: Negative / RBC: 6 /hpf / WBC 1 /hpf   Sq Epi: x / Non Sq Epi: 0 /hpf / Bacteria: Negative

## 2019-01-01 LAB
ANION GAP SERPL CALC-SCNC: 16 MMOL/L — SIGNIFICANT CHANGE UP (ref 5–17)
BUN SERPL-MCNC: 9 MG/DL — SIGNIFICANT CHANGE UP (ref 7–23)
CALCIUM SERPL-MCNC: 8.7 MG/DL — SIGNIFICANT CHANGE UP (ref 8.4–10.5)
CHLORIDE SERPL-SCNC: 98 MMOL/L — SIGNIFICANT CHANGE UP (ref 96–108)
CO2 SERPL-SCNC: 22 MMOL/L — SIGNIFICANT CHANGE UP (ref 22–31)
CREAT SERPL-MCNC: 0.73 MG/DL — SIGNIFICANT CHANGE UP (ref 0.5–1.3)
GLUCOSE SERPL-MCNC: 169 MG/DL — HIGH (ref 70–99)
HBA1C BLD-MCNC: 7.1 % — HIGH (ref 4–5.6)
HCT VFR BLD CALC: 37.2 % — LOW (ref 39–50)
HGB BLD-MCNC: 12.4 G/DL — LOW (ref 13–17)
INR BLD: 2.12 RATIO — HIGH (ref 0.88–1.16)
MCHC RBC-ENTMCNC: 31.2 PG — SIGNIFICANT CHANGE UP (ref 27–34)
MCHC RBC-ENTMCNC: 33.3 GM/DL — SIGNIFICANT CHANGE UP (ref 32–36)
MCV RBC AUTO: 93.7 FL — SIGNIFICANT CHANGE UP (ref 80–100)
PLATELET # BLD AUTO: 155 K/UL — SIGNIFICANT CHANGE UP (ref 150–400)
POTASSIUM SERPL-MCNC: 3.8 MMOL/L — SIGNIFICANT CHANGE UP (ref 3.5–5.3)
POTASSIUM SERPL-SCNC: 3.8 MMOL/L — SIGNIFICANT CHANGE UP (ref 3.5–5.3)
PROTHROM AB SERPL-ACNC: 24.3 SEC — HIGH (ref 10–13.1)
RBC # BLD: 3.97 M/UL — LOW (ref 4.2–5.8)
RBC # FLD: 13.9 % — SIGNIFICANT CHANGE UP (ref 10.3–14.5)
SODIUM SERPL-SCNC: 136 MMOL/L — SIGNIFICANT CHANGE UP (ref 135–145)
WBC # BLD: 6.83 K/UL — SIGNIFICANT CHANGE UP (ref 3.8–10.5)
WBC # FLD AUTO: 6.83 K/UL — SIGNIFICANT CHANGE UP (ref 3.8–10.5)

## 2019-01-01 PROCEDURE — 74183 MRI ABD W/O CNTR FLWD CNTR: CPT | Mod: 26

## 2019-01-01 RX ADMIN — Medication 300 MILLIGRAM(S): at 21:59

## 2019-01-01 RX ADMIN — Medication 0.25 MILLIGRAM(S): at 05:46

## 2019-01-01 RX ADMIN — LOSARTAN POTASSIUM 50 MILLIGRAM(S): 100 TABLET, FILM COATED ORAL at 05:46

## 2019-01-01 RX ADMIN — PANTOPRAZOLE SODIUM 10 MG/HR: 20 TABLET, DELAYED RELEASE ORAL at 14:19

## 2019-01-01 RX ADMIN — LOSARTAN POTASSIUM 50 MILLIGRAM(S): 100 TABLET, FILM COATED ORAL at 17:12

## 2019-01-01 RX ADMIN — CARVEDILOL PHOSPHATE 25 MILLIGRAM(S): 80 CAPSULE, EXTENDED RELEASE ORAL at 21:59

## 2019-01-01 RX ADMIN — MONTELUKAST 10 MILLIGRAM(S): 4 TABLET, CHEWABLE ORAL at 21:59

## 2019-01-01 RX ADMIN — ATORVASTATIN CALCIUM 20 MILLIGRAM(S): 80 TABLET, FILM COATED ORAL at 21:59

## 2019-01-01 RX ADMIN — TAMSULOSIN HYDROCHLORIDE 0.4 MILLIGRAM(S): 0.4 CAPSULE ORAL at 21:59

## 2019-01-01 RX ADMIN — CARVEDILOL PHOSPHATE 25 MILLIGRAM(S): 80 CAPSULE, EXTENDED RELEASE ORAL at 11:05

## 2019-01-01 NOTE — PROGRESS NOTE ADULT - SUBJECTIVE AND OBJECTIVE BOX
INTERVAL HPI/OVERNIGHT EVENTS: no active bleeding, started 2 day prep, Hb stable    MEDICATIONS  (STANDING):  allopurinol 300 milliGRAM(s) Oral at bedtime  atorvastatin 20 milliGRAM(s) Oral at bedtime  carvedilol 25 milliGRAM(s) Oral every 12 hours  digoxin     Tablet 0.25 milliGRAM(s) Oral daily  losartan 50 milliGRAM(s) Oral two times a day  montelukast 10 milliGRAM(s) Oral at bedtime  pantoprazole Infusion 8 mG/Hr (10 mL/Hr) IV Continuous <Continuous>  tamsulosin 0.4 milliGRAM(s) Oral at bedtime    MEDICATIONS  (PRN):      Allergies    penicillin (Unknown)    Intolerances            PHYSICAL EXAM:   Vital Signs:  Vital Signs Last 24 Hrs  T(C): 36.8 (2019 04:39), Max: 36.9 (31 Dec 2018 20:08)  T(F): 98.2 (2019 04:39), Max: 98.4 (31 Dec 2018 20:08)  HR: 80 (2019 11:03) (80 - 95)  BP: 134/82 (2019 11:03) (117/82 - 137/96)  BP(mean): --  RR: 18 (2019 04:39) (18 - 18)  SpO2: 95% (2019 04:39) (95% - 98%)  Daily     Daily     GENERAL:  no distress  HEENT:  NC/AT,  anicteric  CHEST:   no increased effort, breath sounds clear  HEART:  Regular rhythm  ABDOMEN:  Soft, non-tender, non-distended, normoactive bowel sounds,  no masses ,no hepato-splenomegaly, no signs of chronic liver disease  EXTEREMITIES:  no cyanosis      LABS:                        12.4   6.83  )-----------( 155      ( 2019 09:18 )             37.2         136  |  98  |  9   ----------------------------<  169<H>  3.8   |  22  |  0.73    Ca    8.7      2019 07:06    TPro  6.3  /  Alb  3.6  /  TBili  1.0  /  DBili  x   /  AST  17  /  ALT  9<L>  /  AlkPhos  81  12-31    PT/INR - ( 2019 09:18 )   PT: 24.3 sec;   INR: 2.12 ratio         PTT - ( 31 Dec 2018 08:04 )  PTT:44.7 sec  Urinalysis Basic - ( 30 Dec 2018 17:06 )    Color: Yellow / Appearance: Clear / S.017 / pH: x  Gluc: x / Ketone: Negative  / Bili: Negative / Urobili: Negative   Blood: x / Protein: Trace / Nitrite: Negative   Leuk Esterase: Negative / RBC: 6 /hpf / WBC 1 /hpf   Sq Epi: x / Non Sq Epi: 0 /hpf / Bacteria: Negative        RADIOLOGY & ADDITIONAL TESTS:  1

## 2019-01-01 NOTE — PROGRESS NOTE ADULT - SUBJECTIVE AND OBJECTIVE BOX
Subjective: Patient seen and examined. No new events except as noted.     SUBJECTIVE/ROS:  No chest pain, dyspnea, palpitation, or dizziness.       MEDICATIONS:  MEDICATIONS  (STANDING):  allopurinol 300 milliGRAM(s) Oral at bedtime  atorvastatin 20 milliGRAM(s) Oral at bedtime  carvedilol 25 milliGRAM(s) Oral every 12 hours  digoxin     Tablet 0.25 milliGRAM(s) Oral daily  losartan 50 milliGRAM(s) Oral two times a day  montelukast 10 milliGRAM(s) Oral at bedtime  pantoprazole Infusion 8 mG/Hr (10 mL/Hr) IV Continuous <Continuous>  tamsulosin 0.4 milliGRAM(s) Oral at bedtime      PHYSICAL EXAM:  T(C): 36.8 (01-01-19 @ 04:39), Max: 36.9 (12-31-18 @ 20:08)  HR: 95 (01-01-19 @ 04:39) (89 - 95)  BP: 124/86 (01-01-19 @ 04:39) (117/82 - 137/96)  RR: 18 (01-01-19 @ 04:39) (18 - 18)  SpO2: 95% (01-01-19 @ 04:39) (95% - 98%)  Wt(kg): --  I&O's Summary    31 Dec 2018 07:01  -  01 Jan 2019 07:00  --------------------------------------------------------  IN: 460 mL / OUT: 0 mL / NET: 460 mL          Appearance: Normal	  HEENT:   no gross abnormality   Cardiovascular: Normal S1 S2,    Murmur:   Neck: JVP normal  Respiratory: Lungs clear   Gastrointestinal:  Soft, Non-tender  Skin: normal   Neuro: No gross deficits.   Psychiatry:  Mood & affect flat  Ext: No edema      LABS/DATA:    CARDIAC MARKERS:                                13.4   7.39  )-----------( 170      ( 31 Dec 2018 08:10 )             39.0     01-01    136  |  98  |  9   ----------------------------<  169<H>  3.8   |  22  |  0.73    Ca    8.7      01 Jan 2019 07:06    TPro  6.3  /  Alb  3.6  /  TBili  1.0  /  DBili  x   /  AST  17  /  ALT  9<L>  /  AlkPhos  81  12-31    proBNP:   Lipid Profile:   HgA1c:   TSH:     TELE:  EKG:

## 2019-01-01 NOTE — PROGRESS NOTE ADULT - PROBLEM SELECTOR PLAN 1
GI evaluation appreciated   plan for colonoscopy on Wednesday   IV hydration   MRA abd   active type and screen   CT Abd/pelv noted.? mass. also 0.8 cm renal mass   protonix drip   liquid diet  colon prep for inessa colo  drop in Hg level, cont to monitor

## 2019-01-01 NOTE — CONSULT NOTE ADULT - ASSESSMENT
77yo male with PMHx of Atrial fibrillation on Coumadin and Digoxin, HTN, HLD, DM, BPH, Asthma and gout here w/ incidental R renal mass  -- no acute  intervention at this time  -- CT scan reviewed, given heterogeneity, concern for malignancy   -- f/u oncology recs  -- pt to f/u w/Dr. Mason at [facility] for Urology   -- d/w Dr. Hoenig  -- please call if questions 77yo male with PMHx of Atrial fibrillation on Coumadin and Digoxin, HTN, HLD, DM, BPH, Asthma and gout here w/ incidental R renal mass  -- no acute  intervention at this time  -- CT scan reviewed, given heterogeneity, concern for malignancy   -- f/u oncology recs  -- pt to f/u w/Dr. Mason at [facility] for Urology   -- seen and examined w/Dr. Dickey  -- please call if questions

## 2019-01-01 NOTE — PROGRESS NOTE ADULT - SUBJECTIVE AND OBJECTIVE BOX
Subjective: Patient seen and examined. No new events except as noted.     REVIEW OF SYSTEMS:    CONSTITUTIONAL: No weakness, fevers or chills  EYES/ENT: No visual changes;  No vertigo or throat pain   NECK: No pain or stiffness  RESPIRATORY: No cough, wheezing, hemoptysis; No shortness of breath  CARDIOVASCULAR: No chest pain or palpitations  GASTROINTESTINAL: No abdominal or epigastric pain. No nausea, vomiting, or hematemesis; No diarrhea or constipation. No melena or hematochezia.  GENITOURINARY: No dysuria, frequency or hematuria  NEUROLOGICAL: No numbness or weakness  SKIN: No itching, burning, rashes, or lesions   All other review of systems is negative unless indicated above.    MEDICATIONS:  MEDICATIONS  (STANDING):  allopurinol 300 milliGRAM(s) Oral at bedtime  atorvastatin 20 milliGRAM(s) Oral at bedtime  carvedilol 25 milliGRAM(s) Oral every 12 hours  digoxin     Tablet 0.25 milliGRAM(s) Oral daily  losartan 50 milliGRAM(s) Oral two times a day  montelukast 10 milliGRAM(s) Oral at bedtime  pantoprazole Infusion 8 mG/Hr (10 mL/Hr) IV Continuous <Continuous>  tamsulosin 0.4 milliGRAM(s) Oral at bedtime      PHYSICAL EXAM:  T(C): 36.8 (19 @ 04:39), Max: 36.9 (18 @ 20:08)  HR: 95 (19 @ 04:39) (89 - 95)  BP: 124/86 (19 @ 04:39) (117/82 - 137/96)  RR: 18 (19 @ 04:39) (18 - 18)  SpO2: 95% (19 @ 04:39) (95% - 98%)  Wt(kg): --  I&O's Summary    31 Dec 2018 07:01  -  2019 07:00  --------------------------------------------------------  IN: 460 mL / OUT: 0 mL / NET: 460 mL          Appearance: Normal	  HEENT:   Normal oral mucosa, PERRL, EOMI	  Lymphatic: No lymphadenopathy , no edema  Cardiovascular: Normal S1 S2, No JVD, No murmurs , Peripheral pulses palpable 2+ bilaterally  Respiratory: Lungs clear to auscultation, normal effort 	  Gastrointestinal:  Soft, Non-tender, + BS	  Skin: No rashes, No ecchymoses, No cyanosis, warm to touch  Musculoskeletal: Normal range of motion, normal strength  Psychiatry:  Mood & affect appropriate  Ext: No edema      All labs, Imaging and EKGs personally reviewed                           12.4   6.83  )-----------( 155      ( 2019 09:18 )             37.2                   136  |  98  |  9   ----------------------------<  169<H>  3.8   |  22  |  0.73    Ca    8.7      2019 07:06    TPro  6.3  /  Alb  3.6  /  TBili  1.0  /  DBili  x   /  AST  17  /  ALT  9<L>  /  AlkPhos  81      PT/INR - ( 2019 09:18 )   PT: 24.3 sec;   INR: 2.12 ratio         PTT - ( 31 Dec 2018 08:04 )  PTT:44.7 sec                   Urinalysis Basic - ( 30 Dec 2018 17:06 )    Color: Yellow / Appearance: Clear / S.017 / pH: x  Gluc: x / Ketone: Negative  / Bili: Negative / Urobili: Negative   Blood: x / Protein: Trace / Nitrite: Negative   Leuk Esterase: Negative / RBC: 6 /hpf / WBC 1 /hpf   Sq Epi: x / Non Sq Epi: 0 /hpf / Bacteria: Negative          < from: US Kidney and Bladder (18 @ 15:32) >  IMPRESSION:     Predominantly solid right lower pole mass measuring 4.4 x 4.5 x 4.8 cm   with internal vascularity highly concerning for renal cell carcinoma.   Recommend urological consult for further evaluation.    Mildly enlarged prostate gland.

## 2019-01-01 NOTE — CONSULT NOTE ADULT - SUBJECTIVE AND OBJECTIVE BOX
HPI  77yo male with PMHx of Atrial fibrillation on Coumadin and Digoxin, HTN, HLD, DM, BPH, Asthma and gout presents to ED here for rectal bleeding. Pt currently awaiting colonoscopy. CT revealed incidental 3.8cm R renal mass. Pt currently w/o hematuria, flank pain, fevers, chills, n/v, weight loss.    (+) maternal hx CA, pt unclear of type of CA  (-) smoking Hx  SHx: no exposure to dyes    PAST MEDICAL & SURGICAL HISTORY:  Alzheimer's dementia  AF (atrial fibrillation)  DM (diabetes mellitus)  HLD (hyperlipidemia)  GI bleed  GERD (gastroesophageal reflux disease)  Gout  BPH (benign prostatic hyperplasia)  HTN (hypertension)  S/P hernia repair  S/P cataract surgery  S/P knee replacement, bilateral      MEDICATIONS  (STANDING):  allopurinol 300 milliGRAM(s) Oral at bedtime  atorvastatin 20 milliGRAM(s) Oral at bedtime  carvedilol 25 milliGRAM(s) Oral every 12 hours  digoxin     Tablet 0.25 milliGRAM(s) Oral daily  losartan 50 milliGRAM(s) Oral two times a day  montelukast 10 milliGRAM(s) Oral at bedtime  pantoprazole Infusion 8 mG/Hr (10 mL/Hr) IV Continuous <Continuous>  tamsulosin 0.4 milliGRAM(s) Oral at bedtime    MEDICATIONS  (PRN):      FAMILY HISTORY:      Allergies    penicillin (Unknown)    Intolerances        SOCIAL HISTORY:    REVIEW OF SYSTEMS: Otherwise negative as stated in HPI    Physical Exam  Vital signs  T(C): 36.8 (19 @ 04:39), Max: 36.9 (18 @ 20:08)  HR: 95 (19 @ 04:39)  BP: 124/86 (19 @ 04:39)  SpO2: 95% (19 @ 04:39)  Wt(kg): --    Output    UOP    Gen:  [x] NAD [] toxic    Pulm:  [x] no resp distress [] no substernal retractions  	  CV:  [] no JVD  [x] RRR    GI:  [x] Soft [x] ND [x] NT    Back:  no CVAT b/l    LABS:       @ 09:18    WBC 6.83  / Hct 37.2  / SCr --        @ 07:06    WBC --    / Hct --    / SCr 0.73         136  |  98  |  9   ----------------------------<  169<H>  3.8   |  22  |  0.73    Ca    8.7      2019 07:06    TPro  6.3  /  Alb  3.6  /  TBili  1.0  /  DBili  x   /  AST  17  /  ALT  9<L>  /  AlkPhos  81      PT/INR - ( 2019 09:18 )   PT: 24.3 sec;   INR: 2.12 ratio         PTT - ( 31 Dec 2018 08:04 )  PTT:44.7 sec  Urinalysis Basic - ( 30 Dec 2018 17:06 )    Color: Yellow / Appearance: Clear / S.017 / pH: x  Gluc: x / Ketone: Negative  / Bili: Negative / Urobili: Negative   Blood: x / Protein: Trace / Nitrite: Negative   Leuk Esterase: Negative / RBC: 6 /hpf / WBC 1 /hpf   Sq Epi: x / Non Sq Epi: 0 /hpf / Bacteria: Negative    RADIOLOGY:  < from: CT Abdomen and Pelvis w/ Oral Cont and w/ IV Cont (18 @ 16:26) >  KIDNEYS/URETERS: A heterogeneous right renal mass measuring3.8 cm   concerning for neoplasm.    BLADDER: Within normal limits.  REPRODUCTIVE ORGANS: Prostate is mildly enlarged.      < end of copied text > HPI  79yo male with PMHx of Atrial fibrillation on Coumadin and Digoxin, HTN, HLD, DM, BPH, Asthma and gout presents to ED here for rectal bleeding. Pt currently awaiting colonoscopy. CT revealed incidental 3.8cm R renal mass. Pt currently w/o hematuria, flank pain, fevers, chills, n/v, weight loss.    (+) maternal hx CA, pt unclear of type of CA  (-) smoking Hx    PAST MEDICAL & SURGICAL HISTORY:  Alzheimer's dementia  AF (atrial fibrillation)  DM (diabetes mellitus)  HLD (hyperlipidemia)  GI bleed  GERD (gastroesophageal reflux disease)  Gout  BPH (benign prostatic hyperplasia)  HTN (hypertension)  S/P hernia repair  S/P cataract surgery  S/P knee replacement, bilateral      MEDICATIONS  (STANDING):  allopurinol 300 milliGRAM(s) Oral at bedtime  atorvastatin 20 milliGRAM(s) Oral at bedtime  carvedilol 25 milliGRAM(s) Oral every 12 hours  digoxin     Tablet 0.25 milliGRAM(s) Oral daily  losartan 50 milliGRAM(s) Oral two times a day  montelukast 10 milliGRAM(s) Oral at bedtime  pantoprazole Infusion 8 mG/Hr (10 mL/Hr) IV Continuous <Continuous>  tamsulosin 0.4 milliGRAM(s) Oral at bedtime    MEDICATIONS  (PRN):      FAMILY HISTORY:      Allergies    penicillin (Unknown)    Intolerances        SOCIAL HISTORY:    REVIEW OF SYSTEMS: Otherwise negative as stated in HPI    Physical Exam  Vital signs  T(C): 36.8 (19 @ 04:39), Max: 36.9 (18 @ 20:08)  HR: 95 (19 @ 04:39)  BP: 124/86 (19 @ 04:39)  SpO2: 95% (19 @ 04:39)  Wt(kg): --    Output    UOP    Gen:  [x] NAD [] toxic    Pulm:  [x] no resp distress [] no substernal retractions  	  CV:  [] no JVD  [x] RRR    GI:  [x] Soft [x] ND [x] NT    Back:  no CVAT b/l    LABS:       @ 09:18    WBC 6.83  / Hct 37.2  / SCr --        @ 07:06    WBC --    / Hct --    / SCr 0.73         136  |  98  |  9   ----------------------------<  169<H>  3.8   |  22  |  0.73    Ca    8.7      2019 07:06    TPro  6.3  /  Alb  3.6  /  TBili  1.0  /  DBili  x   /  AST  17  /  ALT  9<L>  /  AlkPhos  81  12    PT/INR - ( 2019 09:18 )   PT: 24.3 sec;   INR: 2.12 ratio         PTT - ( 31 Dec 2018 08:04 )  PTT:44.7 sec  Urinalysis Basic - ( 30 Dec 2018 17:06 )    Color: Yellow / Appearance: Clear / S.017 / pH: x  Gluc: x / Ketone: Negative  / Bili: Negative / Urobili: Negative   Blood: x / Protein: Trace / Nitrite: Negative   Leuk Esterase: Negative / RBC: 6 /hpf / WBC 1 /hpf   Sq Epi: x / Non Sq Epi: 0 /hpf / Bacteria: Negative    RADIOLOGY:  < from: CT Abdomen and Pelvis w/ Oral Cont and w/ IV Cont (18 @ 16:26) >  KIDNEYS/URETERS: A heterogeneous right renal mass measuring3.8 cm   concerning for neoplasm.    BLADDER: Within normal limits.  REPRODUCTIVE ORGANS: Prostate is mildly enlarged.      < end of copied text >

## 2019-01-01 NOTE — PROGRESS NOTE ADULT - PROBLEM SELECTOR PLAN 3
suprather INR  Hold Coumadin, restart after bleeding resolved if clear y GI and Card   Card evaluation Dr Walden appreciated   restart Dig, Carvedilol and losartan   monitor vitals

## 2019-01-01 NOTE — PROGRESS NOTE ADULT - ASSESSMENT
Pt is a 77yo male with PMHx of Atrial fibrillation on Coumadin and Digoxin, HTN, HLD, DM, BPH, Asthma and gout presents to ED c/o rectal bleeding (bright red) since this morning.

## 2019-01-01 NOTE — CONSULT NOTE ADULT - ATTENDING COMMENTS
Pt seen/examined.  Case discussed with housestaff/PA team.  Agree with above note history, physical and assessment/plan.  CT and MRI showing ~4 cm R renal mass concerning for neoplasm.  D/w pt that he should see Dr Mason (Uro Oncology) as outpt for further management.  D/w pt that he would likely require partial nephrectomy.  Also d/w pt that for now, renal mass is secondary to his acute GI bleed and hematoma, which do not appear related.  F/u as outpatient

## 2019-01-01 NOTE — PROGRESS NOTE ADULT - ASSESSMENT
PAF  HR stable   cont current meds  a/c on hold given rectal bleed    GIB  plan for endoscopy as per GI   no CV objection to proceed     HTN  stable

## 2019-01-02 ENCOUNTER — RESULT REVIEW (OUTPATIENT)
Age: 79
End: 2019-01-02

## 2019-01-02 LAB
ANION GAP SERPL CALC-SCNC: 15 MMOL/L — SIGNIFICANT CHANGE UP (ref 5–17)
BUN SERPL-MCNC: 5 MG/DL — LOW (ref 7–23)
CALCIUM SERPL-MCNC: 8.6 MG/DL — SIGNIFICANT CHANGE UP (ref 8.4–10.5)
CHLORIDE SERPL-SCNC: 100 MMOL/L — SIGNIFICANT CHANGE UP (ref 96–108)
CO2 SERPL-SCNC: 23 MMOL/L — SIGNIFICANT CHANGE UP (ref 22–31)
CREAT SERPL-MCNC: 0.67 MG/DL — SIGNIFICANT CHANGE UP (ref 0.5–1.3)
GLUCOSE SERPL-MCNC: 142 MG/DL — HIGH (ref 70–99)
HCT VFR BLD CALC: 36.3 % — LOW (ref 39–50)
HGB BLD-MCNC: 12.2 G/DL — LOW (ref 13–17)
MCHC RBC-ENTMCNC: 30.2 PG — SIGNIFICANT CHANGE UP (ref 27–34)
MCHC RBC-ENTMCNC: 33.6 GM/DL — SIGNIFICANT CHANGE UP (ref 32–36)
MCV RBC AUTO: 89.9 FL — SIGNIFICANT CHANGE UP (ref 80–100)
PLATELET # BLD AUTO: 164 K/UL — SIGNIFICANT CHANGE UP (ref 150–400)
POTASSIUM SERPL-MCNC: 3.6 MMOL/L — SIGNIFICANT CHANGE UP (ref 3.5–5.3)
POTASSIUM SERPL-SCNC: 3.6 MMOL/L — SIGNIFICANT CHANGE UP (ref 3.5–5.3)
RBC # BLD: 4.04 M/UL — LOW (ref 4.2–5.8)
RBC # FLD: 13.4 % — SIGNIFICANT CHANGE UP (ref 10.3–14.5)
SODIUM SERPL-SCNC: 138 MMOL/L — SIGNIFICANT CHANGE UP (ref 135–145)
WBC # BLD: 6.85 K/UL — SIGNIFICANT CHANGE UP (ref 3.8–10.5)
WBC # FLD AUTO: 6.85 K/UL — SIGNIFICANT CHANGE UP (ref 3.8–10.5)

## 2019-01-02 PROCEDURE — 88305 TISSUE EXAM BY PATHOLOGIST: CPT | Mod: 26

## 2019-01-02 PROCEDURE — 88312 SPECIAL STAINS GROUP 1: CPT | Mod: 26

## 2019-01-02 RX ORDER — DEXTROSE 50 % IN WATER 50 %
12.5 SYRINGE (ML) INTRAVENOUS ONCE
Qty: 0 | Refills: 0 | Status: DISCONTINUED | OUTPATIENT
Start: 2019-01-02 | End: 2019-01-04

## 2019-01-02 RX ORDER — DEXTROSE 50 % IN WATER 50 %
25 SYRINGE (ML) INTRAVENOUS ONCE
Qty: 0 | Refills: 0 | Status: DISCONTINUED | OUTPATIENT
Start: 2019-01-02 | End: 2019-01-04

## 2019-01-02 RX ORDER — INSULIN LISPRO 100/ML
VIAL (ML) SUBCUTANEOUS AT BEDTIME
Qty: 0 | Refills: 0 | Status: DISCONTINUED | OUTPATIENT
Start: 2019-01-02 | End: 2019-01-04

## 2019-01-02 RX ORDER — SODIUM CHLORIDE 9 MG/ML
1000 INJECTION, SOLUTION INTRAVENOUS
Qty: 0 | Refills: 0 | Status: DISCONTINUED | OUTPATIENT
Start: 2019-01-02 | End: 2019-01-04

## 2019-01-02 RX ORDER — INSULIN LISPRO 100/ML
VIAL (ML) SUBCUTANEOUS
Qty: 0 | Refills: 0 | Status: DISCONTINUED | OUTPATIENT
Start: 2019-01-02 | End: 2019-01-04

## 2019-01-02 RX ORDER — DEXTROSE 50 % IN WATER 50 %
15 SYRINGE (ML) INTRAVENOUS ONCE
Qty: 0 | Refills: 0 | Status: DISCONTINUED | OUTPATIENT
Start: 2019-01-02 | End: 2019-01-04

## 2019-01-02 RX ORDER — GLUCAGON INJECTION, SOLUTION 0.5 MG/.1ML
1 INJECTION, SOLUTION SUBCUTANEOUS ONCE
Qty: 0 | Refills: 0 | Status: DISCONTINUED | OUTPATIENT
Start: 2019-01-02 | End: 2019-01-04

## 2019-01-02 RX ORDER — PANTOPRAZOLE SODIUM 20 MG/1
40 TABLET, DELAYED RELEASE ORAL
Qty: 0 | Refills: 0 | Status: DISCONTINUED | OUTPATIENT
Start: 2019-01-02 | End: 2019-01-04

## 2019-01-02 RX ADMIN — MONTELUKAST 10 MILLIGRAM(S): 4 TABLET, CHEWABLE ORAL at 21:41

## 2019-01-02 RX ADMIN — PANTOPRAZOLE SODIUM 10 MG/HR: 20 TABLET, DELAYED RELEASE ORAL at 05:49

## 2019-01-02 RX ADMIN — Medication 1: at 17:11

## 2019-01-02 RX ADMIN — ATORVASTATIN CALCIUM 20 MILLIGRAM(S): 80 TABLET, FILM COATED ORAL at 21:41

## 2019-01-02 RX ADMIN — PANTOPRAZOLE SODIUM 10 MG/HR: 20 TABLET, DELAYED RELEASE ORAL at 17:38

## 2019-01-02 RX ADMIN — Medication 300 MILLIGRAM(S): at 21:41

## 2019-01-02 RX ADMIN — TAMSULOSIN HYDROCHLORIDE 0.4 MILLIGRAM(S): 0.4 CAPSULE ORAL at 21:41

## 2019-01-02 RX ADMIN — CARVEDILOL PHOSPHATE 25 MILLIGRAM(S): 80 CAPSULE, EXTENDED RELEASE ORAL at 12:35

## 2019-01-02 RX ADMIN — LOSARTAN POTASSIUM 50 MILLIGRAM(S): 100 TABLET, FILM COATED ORAL at 05:49

## 2019-01-02 RX ADMIN — CARVEDILOL PHOSPHATE 25 MILLIGRAM(S): 80 CAPSULE, EXTENDED RELEASE ORAL at 21:41

## 2019-01-02 RX ADMIN — Medication 0.25 MILLIGRAM(S): at 05:51

## 2019-01-02 RX ADMIN — LOSARTAN POTASSIUM 50 MILLIGRAM(S): 100 TABLET, FILM COATED ORAL at 17:12

## 2019-01-02 NOTE — PROGRESS NOTE ADULT - ASSESSMENT
mild gastritis, small colon polyp.  mri done but not resulted.  ? outlet bleed (hemorrhodial) although hgb clearly dropped.  would persue outpatient capsuel  can continue ac.  no blood in colon on procedure.

## 2019-01-02 NOTE — PROGRESS NOTE ADULT - SUBJECTIVE AND OBJECTIVE BOX
NEPHROLOGY-[facility] [phone]        Patient seen and examined         MEDICATIONS  (STANDING):  allopurinol 300 milliGRAM(s) Oral at bedtime  atorvastatin 20 milliGRAM(s) Oral at bedtime  carvedilol 25 milliGRAM(s) Oral every 12 hours  digoxin     Tablet 0.25 milliGRAM(s) Oral daily  losartan 50 milliGRAM(s) Oral two times a day  montelukast 10 milliGRAM(s) Oral at bedtime  pantoprazole Infusion 8 mG/Hr (10 mL/Hr) IV Continuous <Continuous>  tamsulosin 0.4 milliGRAM(s) Oral at bedtime      VITAL:  T(C): , Max: 36.8 (01-02-19 @ 05:11)  T(F): , Max: 98.2 (01-02-19 @ 05:11)  HR: 66 (01-02-19 @ 05:11)  BP: 110/70 (01-02-19 @ 05:11)  BP(mean): --  RR: 18 (01-02-19 @ 05:11)  SpO2: 100% (01-02-19 @ 05:11)  Wt(kg): --    I and O's:    01-01 @ 07:01  -  01-02 @ 07:00  --------------------------------------------------------  IN: 900 mL / OUT: 0 mL / NET: 900 mL          PHYSICAL EXAM:    Constitutional: NAD  Neck:  No JVD  Respiratory: CTAB/L  Cardiovascular: S1 and S2  Gastrointestinal: BS+, soft, NT/ND  Extremities: No peripheral edema  Neurological: A/O x 3, no focal deficits  Psychiatric: Normal mood, normal affect  : No Box  Skin: No rashes  Access: Not applicable    LABS:                        12.2   6.85  )-----------( 164      ( 02 Jan 2019 07:00 )             36.3     01-02    138  |  100  |  5<L>  ----------------------------<  142<H>  3.6   |  23  |  0.67    Ca    8.6      02 Jan 2019 05:49            Urine Studies:          RADIOLOGY & ADDITIONAL STUDIES:

## 2019-01-02 NOTE — PROGRESS NOTE ADULT - PROBLEM SELECTOR PLAN 1
GI evaluation appreciated   S/P EGD/ Colonoscopy   S/P polyp removal, no active bleeding seen   IV hydration   MRA abd noted  active type and screen   CT Abd/pelv noted.? mass. also ?0.8 cm renal mass   protonix  liquid diet  Hg stable since yesterday   restart diet

## 2019-01-02 NOTE — PROGRESS NOTE ADULT - PROBLEM SELECTOR PLAN 3
suprather INR  Hold Coumadin, restart after bleeding resolved if clear y GI and Card   Card evaluation Dr Walden appreciated   restart Dig, Carvedilol and losartan   monitor vitals  start coumadin if clear by GI

## 2019-01-02 NOTE — PROGRESS NOTE ADULT - SUBJECTIVE AND OBJECTIVE BOX
Pre-Endoscopy Evaluation      Referring Physician: dr. radha foster                               Procedure: colonoscopy    Indication for Procedure: gib    Pertinent History:  78y male with PMH of Atrial fibrillation on Coumadin and Digoxin, HTN, HLD, DM, BPH, Asthma and gout presents with rectal bleeding     Sedation by Anesthesia [X]    PAST MEDICAL & SURGICAL HISTORY:  Alzheimer's dementia  AF (atrial fibrillation)  DM (diabetes mellitus)  HLD (hyperlipidemia)  GI bleed  GERD (gastroesophageal reflux disease)  Gout  BPH (benign prostatic hyperplasia)  HTN (hypertension)  S/P hernia repair  S/P cataract surgery  S/P knee replacement, bilateral      PMH of Gastroparesis [ ]  Gastric Surgery [ ]  Gastric Outlet Obstruction [ ]    Allergies    penicillin (Unknown)    Intolerances    Latex allergy: [ ] yes [X] no    Medications:MEDICATIONS  (STANDING):  allopurinol 300 milliGRAM(s) Oral at bedtime  atorvastatin 20 milliGRAM(s) Oral at bedtime  carvedilol 25 milliGRAM(s) Oral every 12 hours  digoxin     Tablet 0.25 milliGRAM(s) Oral daily  losartan 50 milliGRAM(s) Oral two times a day  montelukast 10 milliGRAM(s) Oral at bedtime  pantoprazole Infusion 8 mG/Hr (10 mL/Hr) IV Continuous <Continuous>  tamsulosin 0.4 milliGRAM(s) Oral at bedtime    MEDICATIONS  (PRN):      Smoking: [ ] yes  [X] no    AICD/PPM: [ ] yes   [X] no    Pertinent lab data:                        12.2   6.85  )-----------( 164      ( 02 Jan 2019 07:00 )             36.3     01-02    138  |  100  |  5<L>  ----------------------------<  142<H>  3.6   |  23  |  0.67    Ca    8.6      02 Jan 2019 05:49      PT/INR - ( 01 Jan 2019 09:18 )   PT: 24.3 sec;   INR: 2.12 ratio       CAPILLARY BLOOD GLUCOSE  POCT Blood Glucose.: 132 mg/dL (02 Jan 2019 07:36)      < from: Transthoracic Echocardiogram (11.13.15 @ 07:49) >    Fractional short: 29 %  Ejection Fraction (Darianoltz): 55 %  ------------------------------------------------------------------------  OBSERVATIONS:  Mitral Valve: Mitral annular calcification, otherwise  normal mitral valve. Minimal mitral regurgitation.  Aortic Root: Normal aortic root.  Aortic Valve: Calcified trileaflet aortic valve with normal  opening. Mild aortic regurgitation.  Left Atrium: Normal left atrium.  LA volume index = 22  cc/m2.  Left Ventricle: Endocardium not well visualized; grossly  low normal left ventricular systolic function. Normal left  ventricular internal dimensions and wall thicknesses.  Right Heart: Normal right atrium. The right ventricle is  not well visualized; grossly normal right ventricular  systolic function. Normal tricuspid valve. Minimal  tricuspid regurgitation. Normal pulmonic valve.  Pericardium/PleuraNormal pericardium with no pericardial  effusion.  ------------------------------------------------------------------------  CONCLUSIONS:  1. Mitral annular calcification, otherwise normal mitral  valve. Minimal mitral regurgitation.  2. Calcified trileaflet aortic valve with normal opening.  Mild aortic regurgitation.  3. Normal left ventricular internal dimensions and wall  thicknesses.  4. Endocardium not well visualized; grossly low normal left  ventricular systolic function.  5. The right ventricle is not well visualized; grossly  normal right ventricular systolic function.  ------------------------------------------------------------------------      Physical Examination:   Daily   Vital Signs Last 24 Hrs  T(C): 36.8 (02 Jan 2019 05:11), Max: 36.8 (02 Jan 2019 05:11)  T(F): 98.2 (02 Jan 2019 05:11), Max: 98.2 (02 Jan 2019 05:11)  HR: 66 (02 Jan 2019 05:11) (66 - 91)  BP: 110/70 (02 Jan 2019 05:11) (110/70 - 146/88)  BP(mean): --  RR: 18 (02 Jan 2019 05:11) (18 - 19)  SpO2: 100% (02 Jan 2019 05:11) (98% - 100%)    Drug Dosing Weight  Height (cm): 170.18 (30 Dec 2018 11:53)  Weight (kg): 90.7 (30 Dec 2018 11:53)  BMI (kg/m2): 31.3 (30 Dec 2018 11:53)  BSA (m2): 2.02 (30 Dec 2018 11:53)    Constitutional: NAD     Neck:  No JVD    Respiratory: CTAB/L    Cardiovascular: S1 and S2    Gastrointestinal: BS+, soft, NT/ND    Extremities: No peripheral edema    Neurological: A/O x 3    : No Box    Skin: No rashes    Comments:    ASA Class: I [ ]  II [ ]  III [ ]  IV [X]    The patient is a suitable candidate for the planned procedure unless box checked [ ]  No, explain:

## 2019-01-02 NOTE — PROGRESS NOTE ADULT - SUBJECTIVE AND OBJECTIVE BOX
Subjective: Patient seen and examined. No new events except as noted.     SUBJECTIVE/ROS:  events noted       MEDICATIONS:  MEDICATIONS  (STANDING):  allopurinol 300 milliGRAM(s) Oral at bedtime  atorvastatin 20 milliGRAM(s) Oral at bedtime  carvedilol 25 milliGRAM(s) Oral every 12 hours  digoxin     Tablet 0.25 milliGRAM(s) Oral daily  losartan 50 milliGRAM(s) Oral two times a day  montelukast 10 milliGRAM(s) Oral at bedtime  pantoprazole Infusion 8 mG/Hr (10 mL/Hr) IV Continuous <Continuous>  tamsulosin 0.4 milliGRAM(s) Oral at bedtime      PHYSICAL EXAM:  T(C): 36.8 (01-02-19 @ 05:11), Max: 36.8 (01-02-19 @ 05:11)  HR: 66 (01-02-19 @ 05:11) (66 - 91)  BP: 110/70 (01-02-19 @ 05:11) (110/70 - 146/88)  RR: 18 (01-02-19 @ 05:11) (18 - 19)  SpO2: 100% (01-02-19 @ 05:11) (98% - 100%)  Wt(kg): --  I&O's Summary    01 Jan 2019 07:01  -  02 Jan 2019 07:00  --------------------------------------------------------  IN: 900 mL / OUT: 0 mL / NET: 900 mL    02 Jan 2019 07:01  -  02 Jan 2019 11:44  --------------------------------------------------------  IN: 360 mL / OUT: 0 mL / NET: 360 mL          Appearance: Normal	  HEENT:   no gross abnormality   Cardiovascular: Normal S1 S2,    Murmur:   Neck: JVP normal  Respiratory: Lungs clear   Gastrointestinal:  Soft, Non-tender  Skin: normal   Neuro: No gross deficits.   Psychiatry:  Mood & affect flat  Ext: No edema      LABS/DATA:    CARDIAC MARKERS:                                12.2   6.85  )-----------( 164      ( 02 Jan 2019 07:00 )             36.3     01-02    138  |  100  |  5<L>  ----------------------------<  142<H>  3.6   |  23  |  0.67    Ca    8.6      02 Jan 2019 05:49      proBNP:   Lipid Profile:   HgA1c:   TSH:     TELE:  EKG:

## 2019-01-02 NOTE — PROGRESS NOTE ADULT - ASSESSMENT
The patient is a lb 78-year-old gentleman with past medical history of diabetes, hypertension, atrial fibrillation, presents for evaluation of bright red blood per rectum.  The patient has a 3.8 cm right renal lesion concerning for malignancy.        1.  :  The original CT scan was wrong and the lesion is 3.8 cm and NOT 0.8.  In any event he needs to follow up with Urology as an outpt for likely subtotal nephroectomy  2.  GI:  SP scopes todau  3. CVS-Not in heart failure

## 2019-01-02 NOTE — PROGRESS NOTE ADULT - ASSESSMENT
PAF  HR stable   cont current meds  off a/c given pancreatic hematoma     RCC suspected on imaging  would consult     HTN  stable

## 2019-01-02 NOTE — PROGRESS NOTE ADULT - SUBJECTIVE AND OBJECTIVE BOX
pt with hematoma, ? pseudoaneurysm and hematoma on ct mri.  recommend vascular surgery eval.  did not go to 3rd duodenum on egd, but no obvious soruce of bleed and seems hemosuccus pancreaticus unlikely? case d/w medicine and nurse practioner who will consult vascular surgery

## 2019-01-02 NOTE — PROGRESS NOTE ADULT - SUBJECTIVE AND OBJECTIVE BOX
STEFAN DURAND:[id number],   78yMale followed for:  penicillin (Unknown)    PAST MEDICAL & SURGICAL HISTORY:  Alzheimer's dementia  AF (atrial fibrillation)  DM (diabetes mellitus)  HLD (hyperlipidemia)  GI bleed  GERD (gastroesophageal reflux disease)  Gout  BPH (benign prostatic hyperplasia)  HTN (hypertension)  S/P hernia repair  S/P cataract surgery  S/P knee replacement, bilateral    FAMILY HISTORY:    MEDICATIONS  (STANDING):  allopurinol 300 milliGRAM(s) Oral at bedtime  atorvastatin 20 milliGRAM(s) Oral at bedtime  carvedilol 25 milliGRAM(s) Oral every 12 hours  digoxin     Tablet 0.25 milliGRAM(s) Oral daily  losartan 50 milliGRAM(s) Oral two times a day  montelukast 10 milliGRAM(s) Oral at bedtime  pantoprazole Infusion 8 mG/Hr (10 mL/Hr) IV Continuous <Continuous>  tamsulosin 0.4 milliGRAM(s) Oral at bedtime    MEDICATIONS  (PRN):      Vital Signs Last 24 Hrs  T(C): 36.8 (02 Jan 2019 05:11), Max: 36.8 (02 Jan 2019 05:11)  T(F): 98.2 (02 Jan 2019 05:11), Max: 98.2 (02 Jan 2019 05:11)  HR: 66 (02 Jan 2019 05:11) (66 - 91)  BP: 110/70 (02 Jan 2019 05:11) (110/70 - 146/88)  BP(mean): --  RR: 18 (02 Jan 2019 05:11) (18 - 19)  SpO2: 100% (02 Jan 2019 05:11) (98% - 100%)  nc/at  s1s2  cta  soft, nt, nd no guarding or rebound  no c/c/e    CBC Full  -  ( 02 Jan 2019 07:00 )  WBC Count : 6.85 K/uL  Hemoglobin : 12.2 g/dL  Hematocrit : 36.3 %  Platelet Count - Automated : 164 K/uL  Mean Cell Volume : 89.9 fl  Mean Cell Hemoglobin : 30.2 pg  Mean Cell Hemoglobin Concentration : 33.6 gm/dL  Auto Neutrophil # : x  Auto Lymphocyte # : x  Auto Monocyte # : x  Auto Eosinophil # : x  Auto Basophil # : x  Auto Neutrophil % : x  Auto Lymphocyte % : x  Auto Monocyte % : x  Auto Eosinophil % : x  Auto Basophil % : x    01-02    138  |  100  |  5<L>  ----------------------------<  142<H>  3.6   |  23  |  0.67    Ca    8.6      02 Jan 2019 05:49      PT/INR - ( 01 Jan 2019 09:18 )   PT: 24.3 sec;   INR: 2.12 ratio

## 2019-01-02 NOTE — PROGRESS NOTE ADULT - PROBLEM SELECTOR PLAN 2
kidney US noted   renal evaluation noted   monitor BUN/Cr level  Uro/Onc consulted, pending  urology eval appreciated   outpatient f/u for further urologic W/U and management kidney US noted   renal evaluation noted   monitor BUN/Cr level  urology eval appreciated   outpatient f/u for further urologic W/U and management

## 2019-01-02 NOTE — PROGRESS NOTE ADULT - SUBJECTIVE AND OBJECTIVE BOX
Subjective: Patient seen and examined. No new events except as noted.   Colonoscopy and EGD today     REVIEW OF SYSTEMS:    CONSTITUTIONAL: No weakness, fevers or chills  EYES/ENT: No visual changes;  No vertigo or throat pain   NECK: No pain or stiffness  RESPIRATORY: No cough, wheezing, hemoptysis; No shortness of breath  CARDIOVASCULAR: No chest pain or palpitations  GASTROINTESTINAL: No abdominal or epigastric pain. No nausea, vomiting, or hematemesis; No diarrhea or constipation. No melena or hematochezia.  GENITOURINARY: No dysuria, frequency or hematuria  NEUROLOGICAL: No numbness or weakness  SKIN: No itching, burning, rashes, or lesions   All other review of systems is negative unless indicated above.    MEDICATIONS:  MEDICATIONS  (STANDING):  allopurinol 300 milliGRAM(s) Oral at bedtime  atorvastatin 20 milliGRAM(s) Oral at bedtime  carvedilol 25 milliGRAM(s) Oral every 12 hours  dextrose 5%. 1000 milliLiter(s) (50 mL/Hr) IV Continuous <Continuous>  dextrose 50% Injectable 12.5 Gram(s) IV Push once  dextrose 50% Injectable 25 Gram(s) IV Push once  dextrose 50% Injectable 25 Gram(s) IV Push once  digoxin     Tablet 0.25 milliGRAM(s) Oral daily  insulin lispro (HumaLOG) corrective regimen sliding scale   SubCutaneous three times a day before meals  insulin lispro (HumaLOG) corrective regimen sliding scale   SubCutaneous at bedtime  losartan 50 milliGRAM(s) Oral two times a day  montelukast 10 milliGRAM(s) Oral at bedtime  pantoprazole Infusion 8 mG/Hr (10 mL/Hr) IV Continuous <Continuous>  tamsulosin 0.4 milliGRAM(s) Oral at bedtime      PHYSICAL EXAM:  T(C): 36.8 (01-02-19 @ 12:26), Max: 36.8 (01-02-19 @ 05:11)  HR: 82 (01-02-19 @ 12:26) (66 - 91)  BP: 144/84 (01-02-19 @ 12:26) (110/70 - 146/88)  RR: 18 (01-02-19 @ 12:26) (18 - 19)  SpO2: 98% (01-02-19 @ 12:26) (98% - 100%)  Wt(kg): --  I&O's Summary    01 Jan 2019 07:01  -  02 Jan 2019 07:00  --------------------------------------------------------  IN: 900 mL / OUT: 0 mL / NET: 900 mL    02 Jan 2019 07:01  -  02 Jan 2019 13:26  --------------------------------------------------------  IN: 360 mL / OUT: 0 mL / NET: 360 mL          Appearance: Normal	  HEENT:   Normal oral mucosa, PERRL, EOMI	  Lymphatic: No lymphadenopathy , no edema  Cardiovascular: Normal S1 S2, No JVD, No murmurs , Peripheral pulses palpable 2+ bilaterally  Respiratory: Lungs clear to auscultation, normal effort 	  Gastrointestinal:  Soft, Non-tender, + BS	  Skin: No rashes, No ecchymoses, No cyanosis, warm to touch  Musculoskeletal: Normal range of motion, normal strength  Psychiatry:  Mood & affect appropriate  Ext: No edema      All labs, Imaging and EKGs personally reviewed                           12.2   6.85  )-----------( 164      ( 02 Jan 2019 07:00 )             36.3               01-02    138  |  100  |  5<L>  ----------------------------<  142<H>  3.6   |  23  |  0.67    Ca    8.6      02 Jan 2019 05:49      PT/INR - ( 01 Jan 2019 09:18 )   PT: 24.3 sec;   INR: 2.12 ratio

## 2019-01-03 LAB
ANION GAP SERPL CALC-SCNC: 11 MMOL/L — SIGNIFICANT CHANGE UP (ref 5–17)
BUN SERPL-MCNC: 8 MG/DL — SIGNIFICANT CHANGE UP (ref 7–23)
CALCIUM SERPL-MCNC: 8.6 MG/DL — SIGNIFICANT CHANGE UP (ref 8.4–10.5)
CHLORIDE SERPL-SCNC: 101 MMOL/L — SIGNIFICANT CHANGE UP (ref 96–108)
CO2 SERPL-SCNC: 26 MMOL/L — SIGNIFICANT CHANGE UP (ref 22–31)
CREAT SERPL-MCNC: 0.68 MG/DL — SIGNIFICANT CHANGE UP (ref 0.5–1.3)
GLUCOSE SERPL-MCNC: 128 MG/DL — HIGH (ref 70–99)
HCT VFR BLD CALC: 39.7 % — SIGNIFICANT CHANGE UP (ref 39–50)
HGB BLD-MCNC: 12.9 G/DL — LOW (ref 13–17)
INR BLD: 1.55 RATIO — HIGH (ref 0.88–1.16)
MCHC RBC-ENTMCNC: 30.7 PG — SIGNIFICANT CHANGE UP (ref 27–34)
MCHC RBC-ENTMCNC: 32.5 GM/DL — SIGNIFICANT CHANGE UP (ref 32–36)
MCV RBC AUTO: 94.5 FL — SIGNIFICANT CHANGE UP (ref 80–100)
PLATELET # BLD AUTO: 162 K/UL — SIGNIFICANT CHANGE UP (ref 150–400)
POTASSIUM SERPL-MCNC: 3.7 MMOL/L — SIGNIFICANT CHANGE UP (ref 3.5–5.3)
POTASSIUM SERPL-SCNC: 3.7 MMOL/L — SIGNIFICANT CHANGE UP (ref 3.5–5.3)
PROTHROM AB SERPL-ACNC: 17.6 SEC — HIGH (ref 10–13.1)
RBC # BLD: 4.2 M/UL — SIGNIFICANT CHANGE UP (ref 4.2–5.8)
RBC # FLD: 14.1 % — SIGNIFICANT CHANGE UP (ref 10.3–14.5)
SODIUM SERPL-SCNC: 138 MMOL/L — SIGNIFICANT CHANGE UP (ref 135–145)
SURGICAL PATHOLOGY STUDY: SIGNIFICANT CHANGE UP
WBC # BLD: 8.08 K/UL — SIGNIFICANT CHANGE UP (ref 3.8–10.5)
WBC # FLD AUTO: 8.08 K/UL — SIGNIFICANT CHANGE UP (ref 3.8–10.5)

## 2019-01-03 PROCEDURE — 74174 CTA ABD&PLVS W/CONTRAST: CPT | Mod: 26

## 2019-01-03 PROCEDURE — 99223 1ST HOSP IP/OBS HIGH 75: CPT

## 2019-01-03 PROCEDURE — 74177 CT ABD & PELVIS W/CONTRAST: CPT | Mod: 26

## 2019-01-03 RX ADMIN — CARVEDILOL PHOSPHATE 25 MILLIGRAM(S): 80 CAPSULE, EXTENDED RELEASE ORAL at 21:00

## 2019-01-03 RX ADMIN — Medication 300 MILLIGRAM(S): at 21:01

## 2019-01-03 RX ADMIN — MONTELUKAST 10 MILLIGRAM(S): 4 TABLET, CHEWABLE ORAL at 21:00

## 2019-01-03 RX ADMIN — CARVEDILOL PHOSPHATE 25 MILLIGRAM(S): 80 CAPSULE, EXTENDED RELEASE ORAL at 10:10

## 2019-01-03 RX ADMIN — TAMSULOSIN HYDROCHLORIDE 0.4 MILLIGRAM(S): 0.4 CAPSULE ORAL at 21:00

## 2019-01-03 RX ADMIN — LOSARTAN POTASSIUM 50 MILLIGRAM(S): 100 TABLET, FILM COATED ORAL at 18:12

## 2019-01-03 RX ADMIN — Medication 1: at 11:49

## 2019-01-03 RX ADMIN — ATORVASTATIN CALCIUM 20 MILLIGRAM(S): 80 TABLET, FILM COATED ORAL at 21:00

## 2019-01-03 RX ADMIN — PANTOPRAZOLE SODIUM 40 MILLIGRAM(S): 20 TABLET, DELAYED RELEASE ORAL at 06:03

## 2019-01-03 RX ADMIN — Medication 0.25 MILLIGRAM(S): at 05:25

## 2019-01-03 RX ADMIN — LOSARTAN POTASSIUM 50 MILLIGRAM(S): 100 TABLET, FILM COATED ORAL at 05:25

## 2019-01-03 NOTE — PROGRESS NOTE ADULT - PROBLEM SELECTOR PLAN 4
Sliding scale  Hold metformin  HgA1C level

## 2019-01-03 NOTE — PROGRESS NOTE ADULT - PROBLEM SELECTOR PLAN 1
GI evaluation appreciated   S/P EGD/ Colonoscopy   S/P polyp removal, no active bleeding seen   IV hydration   MRA abd noted  active type and screen   CT Abd/pelv noted.? mass. also ?0.8 cm renal mass   protonix  Hg stable

## 2019-01-03 NOTE — PROGRESS NOTE ADULT - ASSESSMENT
no sign of gib now.  need op capsule.  need clearance from vascular re: ? anyurism and risk for abominal bleeding with a/c before d/c

## 2019-01-03 NOTE — PROGRESS NOTE ADULT - ATTENDING COMMENTS
DW son in detail and the issue of follow up with Urology stressed.  I have informed him that the lesion is likely malignant    Will Sign off  Thank You

## 2019-01-03 NOTE — PROGRESS NOTE ADULT - PROBLEM SELECTOR PLAN 5
Cont  BP medication  close monitoring

## 2019-01-03 NOTE — PROGRESS NOTE ADULT - PROBLEM SELECTOR PLAN 2
kidney US noted   renal evaluation noted   monitor BUN/Cr level  urology eval appreciated   outpatient f/u for further urologic W/U and management  case was discussed in details with family and the patient at the bedside for necessity to F/U with urology for acute management

## 2019-01-03 NOTE — PROGRESS NOTE ADULT - SUBJECTIVE AND OBJECTIVE BOX
NEPHROLOGY-[facility] [phone]        Patient seen and examined in bed.  He was seen with family at bedside        MEDICATIONS  (STANDING):  allopurinol 300 milliGRAM(s) Oral at bedtime  atorvastatin 20 milliGRAM(s) Oral at bedtime  carvedilol 25 milliGRAM(s) Oral every 12 hours  dextrose 5%. 1000 milliLiter(s) (50 mL/Hr) IV Continuous <Continuous>  dextrose 50% Injectable 12.5 Gram(s) IV Push once  dextrose 50% Injectable 25 Gram(s) IV Push once  dextrose 50% Injectable 25 Gram(s) IV Push once  digoxin     Tablet 0.25 milliGRAM(s) Oral daily  insulin lispro (HumaLOG) corrective regimen sliding scale   SubCutaneous three times a day before meals  insulin lispro (HumaLOG) corrective regimen sliding scale   SubCutaneous at bedtime  losartan 50 milliGRAM(s) Oral two times a day  montelukast 10 milliGRAM(s) Oral at bedtime  pantoprazole    Tablet 40 milliGRAM(s) Oral before breakfast  tamsulosin 0.4 milliGRAM(s) Oral at bedtime      VITAL:  T(C): , Max: 36.9 (01-02-19 @ 19:59)  T(F): , Max: 98.5 (01-03-19 @ 04:45)  HR: 83 (01-03-19 @ 11:25)  BP: 138/95 (01-03-19 @ 11:25)  BP(mean): --  RR: 18 (01-03-19 @ 11:25)  SpO2: 96% (01-03-19 @ 11:25)  Wt(kg): --    I and O's:    01-02 @ 07:01  -  01-03 @ 07:00  --------------------------------------------------------  IN: 1430 mL / OUT: 0 mL / NET: 1430 mL    01-03 @ 07:01  -  01-03 @ 13:04  --------------------------------------------------------  IN: 240 mL / OUT: 0 mL / NET: 240 mL          PHYSICAL EXAM:    Constitutional: NAD  Neck:  No JVD  Respiratory: CTAB/L  Cardiovascular: S1 and S2  Gastrointestinal: BS+, soft, NT/ND  Extremities: No peripheral edema  Neurological: A/O x 3, no focal deficits  Psychiatric: Normal mood, normal affect  : No Box  Skin: No rashes  Access: Not applicable    LABS:                        12.9   8.08  )-----------( 162      ( 03 Jan 2019 08:52 )             39.7     01-03    138  |  101  |  8   ----------------------------<  128<H>  3.7   |  26  |  0.68    Ca    8.6      03 Jan 2019 07:14            Urine Studies:          RADIOLOGY & ADDITIONAL STUDIES:

## 2019-01-03 NOTE — CONSULT NOTE ADULT - SUBJECTIVE AND OBJECTIVE BOX
General Surgery Consult      Consulting surgical team: Vascular  Consulting attending: Dr. Luther       HPI: Eyad Angel is a 78 y.o. man with history of Afib (Coumadin and Digoxin), HTN, HLD, DM, BPH, Asthma and gout who presented to the ED on 12/30 for rectal bleeding (bright red), which began on the AM of 12/30. At the time of presentation, the patient also complained of intermittent nausea, dizziness, and RUQ abdominal pain.     INR at the time of presentation was 3.25.     PAST MEDICAL HISTORY:  Alzheimer's dementia  AF (atrial fibrillation)  DM (diabetes mellitus)  HLD (hyperlipidemia)  GI bleed  GERD (gastroesophageal reflux disease)  Arthritis  Hyperlipidemia  Gout  BPH (benign prostatic hyperplasia)  Afib  HTN (hypertension)      PAST SURGICAL HISTORY:  S/P hernia repair  S/P cataract surgery  S/P knee replacement, bilateral      MEDICATIONS:  allopurinol 300 milliGRAM(s) Oral at bedtime  atorvastatin 20 milliGRAM(s) Oral at bedtime  carvedilol 25 milliGRAM(s) Oral every 12 hours  dextrose 40% Gel 15 Gram(s) Oral once PRN  dextrose 5%. 1000 milliLiter(s) IV Continuous <Continuous>  dextrose 50% Injectable 12.5 Gram(s) IV Push once  dextrose 50% Injectable 25 Gram(s) IV Push once  dextrose 50% Injectable 25 Gram(s) IV Push once  digoxin     Tablet 0.25 milliGRAM(s) Oral daily  glucagon  Injectable 1 milliGRAM(s) IntraMuscular once PRN  insulin lispro (HumaLOG) corrective regimen sliding scale   SubCutaneous three times a day before meals  insulin lispro (HumaLOG) corrective regimen sliding scale   SubCutaneous at bedtime  losartan 50 milliGRAM(s) Oral two times a day  montelukast 10 milliGRAM(s) Oral at bedtime  pantoprazole    Tablet 40 milliGRAM(s) Oral before breakfast  tamsulosin 0.4 milliGRAM(s) Oral at bedtime      ALLERGIES:  penicillin (Unknown)      VITALS & I/Os:  Vital Signs Last 24 Hrs  T(C): 36.8 (03 Jan 2019 10:07), Max: 36.9 (02 Jan 2019 19:59)  T(F): 98.3 (03 Jan 2019 10:07), Max: 98.5 (03 Jan 2019 04:45)  HR: 82 (03 Jan 2019 10:07) (80 - 85)  BP: 128/78 (03 Jan 2019 10:07) (128/78 - 146/92)  BP(mean): --  RR: 18 (03 Jan 2019 10:07) (18 - 18)  SpO2: 98% (03 Jan 2019 10:07) (95% - 98%)    I&O's Summary    02 Jan 2019 07:01  -  03 Jan 2019 07:00  --------------------------------------------------------  IN: 1430 mL / OUT: 0 mL / NET: 1430 mL    03 Jan 2019 07:01  -  03 Jan 2019 11:03  --------------------------------------------------------  IN: 240 mL / OUT: 0 mL / NET: 240 mL        PHYSICAL EXAM:  General: No acute distress  Respiratory: Nonlabored  Cardiovascular: RRR  Abdominal: Soft, nondistended, nontender. No rebound or guarding. No organomegaly, no palpable mass.  Extremities: Warm    LABS:                        12.9   8.08  )-----------( 162      ( 03 Jan 2019 08:52 )             39.7     01-03    138  |  101  |  8   ----------------------------<  128<H>  3.7   |  26  |  0.68    Ca    8.6      03 Jan 2019 07:14      Lactate:    PT/INR - ( 03 Jan 2019 08:52 )   PT: 17.6 sec;   INR: 1.55 ratio        IMAGING:  Colonoscopy (01.02.19 @ 08:37)   - A 5 mm polyp was found in the sigmoid colon. The polyp was sessile. The polyp was removed with a cold biopsy forceps. Resection and retrieval were complete.                                                                                                      Impression:            - One 5 mm polyp in the sigmoid colon, removed with a cold biopsy forceps. Resected and retrieved.    Upper Endoscopy (01.02.19 @ 09:05)   - The esophagus was normal.  - The stomach was normal. mild gastritis, bx  - The examined duodenum was normal.                                                                                                     Impression:            - Normal esophagus.  - Normal stomach.  - Normal examined duodenum.  - mild gastritis, bx    Surgical Pathology Report (01.02.19 @ 09:30)  1. Stomach, antrum, biopsy:  - Gastric antral and oxyntic mucosa with no significant  histopathologic findings.  - Negative for Helicobacter pylori (special stain).  - Negative for intestinal metaplasia.    2. Colon, sigmoid, polyp, biopsy:  - Hyperplastic polyp.    CT Abdomen and Pelvis w/ Oral Cont and w/ IV Cont (12.30.18 @ 16:26)   LOWER CHEST: Calcific atherosclerosis of aorta and coronary arteries.   Aortic valve calcification. Mild cardiomegaly.    LIVER: Within normal limits.  BILE DUCTS: Normal caliber.  GALLBLADDER: Within normal limits.  SPLEEN: Within normal limits.  PANCREAS: A hypodense lesion adjacent/within the second portion of the   duodenum near the uncinate process of pancreas measuring up to 5.6 x 4.3   cm with a focal hyperdense lesion.  ADRENALS: Within normal limits.  KIDNEYS/URETERS: A heterogeneous right renal mass measuring3.8 cm   concerning for neoplasm.    BLADDER: Within normal limits.  REPRODUCTIVE ORGANS: Prostate is mildly enlarged.    BOWEL: A hypodense lesion adjacent/within the second portion of the   duodenum near the uncinate process of pancreas measuring up to 5.6 x 4.3   cm with a focal hyperdense lesion. No bowel obstruction. Status post   appendectomy.  PERITONEUM: No ascites.  VESSELS:  Within normal limits.  RETROPERITONEUM: No lymphadenopathy.    ABDOMINAL WALL: Within normal limits.  BONES: Chronic healed right 10th rib fracture.    IMPRESSION:     Motion limited examination.     A heterogeneous right renal mass measuring 0.8 cm concerning for neoplasm.    A hypodense mass in the mid abdomen adjacent to second portion of   duodenum and uncinate process of the pancreas with focal hyperdense   lesion. Recommend further evaluation with MR study.    MR Abdomen w/wo IV Cont (01.01.19 @ 18:40)   LOWER CHEST: Cardiomegaly.    LIVER: Within normal limits.   BILE DUCTS: Normal caliber.  GALLBLADDER: Within normal limits.  SPLEEN: Within normal limits.  PANCREAS: A 5.5 x 4.3 x 4.3 cm heterogeneous lesion in the uncinate   process with T1 hyperintense components and no definite enhancement. A   hyperdense or enhancing component seen on the prior CT is no longer   visualized. There is no pancreatic ductal dilatation or cutoff.   ADRENALS: Within normal limits.  KIDNEYS/URETERS: Redemonstration of 4.9 cm heterogeneously enhancing   right lower renal pole mass. No hydronephrosis.    VISUALIZED PORTIONS:    BOWEL: Within normal limits.   PERITONEUM: No ascites.  VESSELS: Within normal limits.  RETROPERITONEUM: No lymphadenopathy.    ABDOMINAL WALL: Within normal limits.  BONES: Within normal limits.    IMPRESSION:   A 4.9 cm right lower renalpole mass compatible with RCC.    A 5.5 cm lesion in the uncinate process of the pancreas suggestive of   hematoma. An enhancing or hyperdense component seen on the recent CT is   no longer visualized. General Surgery Consult      Consulting surgical team: Vascular  Consulting attending: Dr. Benson      HPI: Eyad Angel is a 78 y.o. man with history of Afib (Coumadin and Digoxin), HTN, HLD, DM, BPH, Asthma and gout who presented to the ED on 12/30 for rectal bleeding (bright red), which began on the AM of 12/30. At the time of presentation, the patient also complained of intermittent nausea, dizziness, and RUQ abdominal pain.     INR at the time of presentation was 3.25.     PAST MEDICAL HISTORY:  Alzheimer's dementia  AF (atrial fibrillation)  DM (diabetes mellitus)  HLD (hyperlipidemia)  GI bleed  GERD (gastroesophageal reflux disease)  Arthritis  Hyperlipidemia  Gout  BPH (benign prostatic hyperplasia)  Afib  HTN (hypertension)      PAST SURGICAL HISTORY:  S/P hernia repair  S/P cataract surgery  S/P knee replacement, bilateral      MEDICATIONS:  allopurinol 300 milliGRAM(s) Oral at bedtime  atorvastatin 20 milliGRAM(s) Oral at bedtime  carvedilol 25 milliGRAM(s) Oral every 12 hours  dextrose 40% Gel 15 Gram(s) Oral once PRN  dextrose 5%. 1000 milliLiter(s) IV Continuous <Continuous>  dextrose 50% Injectable 12.5 Gram(s) IV Push once  dextrose 50% Injectable 25 Gram(s) IV Push once  dextrose 50% Injectable 25 Gram(s) IV Push once  digoxin     Tablet 0.25 milliGRAM(s) Oral daily  glucagon  Injectable 1 milliGRAM(s) IntraMuscular once PRN  insulin lispro (HumaLOG) corrective regimen sliding scale   SubCutaneous three times a day before meals  insulin lispro (HumaLOG) corrective regimen sliding scale   SubCutaneous at bedtime  losartan 50 milliGRAM(s) Oral two times a day  montelukast 10 milliGRAM(s) Oral at bedtime  pantoprazole    Tablet 40 milliGRAM(s) Oral before breakfast  tamsulosin 0.4 milliGRAM(s) Oral at bedtime      ALLERGIES:  penicillin (Unknown)      VITALS & I/Os:  Vital Signs Last 24 Hrs  T(C): 36.8 (03 Jan 2019 10:07), Max: 36.9 (02 Jan 2019 19:59)  T(F): 98.3 (03 Jan 2019 10:07), Max: 98.5 (03 Jan 2019 04:45)  HR: 82 (03 Jan 2019 10:07) (80 - 85)  BP: 128/78 (03 Jan 2019 10:07) (128/78 - 146/92)  BP(mean): --  RR: 18 (03 Jan 2019 10:07) (18 - 18)  SpO2: 98% (03 Jan 2019 10:07) (95% - 98%)    I&O's Summary    02 Jan 2019 07:01  -  03 Jan 2019 07:00  --------------------------------------------------------  IN: 1430 mL / OUT: 0 mL / NET: 1430 mL    03 Jan 2019 07:01  -  03 Jan 2019 11:03  --------------------------------------------------------  IN: 240 mL / OUT: 0 mL / NET: 240 mL        PHYSICAL EXAM:  General: No acute distress  Respiratory: Nonlabored  Cardiovascular: RRR  Abdominal: Soft, nondistended, nontender. No rebound or guarding. No organomegaly, no palpable mass.  Extremities: Warm    LABS:                        12.9   8.08  )-----------( 162      ( 03 Jan 2019 08:52 )             39.7     01-03    138  |  101  |  8   ----------------------------<  128<H>  3.7   |  26  |  0.68    Ca    8.6      03 Jan 2019 07:14      Lactate:    PT/INR - ( 03 Jan 2019 08:52 )   PT: 17.6 sec;   INR: 1.55 ratio        IMAGING:  Colonoscopy (01.02.19 @ 08:37)   - A 5 mm polyp was found in the sigmoid colon. The polyp was sessile. The polyp was removed with a cold biopsy forceps. Resection and retrieval were complete.                                                                                                      Impression:            - One 5 mm polyp in the sigmoid colon, removed with a cold biopsy forceps. Resected and retrieved.    Upper Endoscopy (01.02.19 @ 09:05)   - The esophagus was normal.  - The stomach was normal. mild gastritis, bx  - The examined duodenum was normal.                                                                                                     Impression:            - Normal esophagus.  - Normal stomach.  - Normal examined duodenum.  - mild gastritis, bx    Surgical Pathology Report (01.02.19 @ 09:30)  1. Stomach, antrum, biopsy:  - Gastric antral and oxyntic mucosa with no significant  histopathologic findings.  - Negative for Helicobacter pylori (special stain).  - Negative for intestinal metaplasia.    2. Colon, sigmoid, polyp, biopsy:  - Hyperplastic polyp.    CT Abdomen and Pelvis w/ Oral Cont and w/ IV Cont (12.30.18 @ 16:26)   LOWER CHEST: Calcific atherosclerosis of aorta and coronary arteries.   Aortic valve calcification. Mild cardiomegaly.    LIVER: Within normal limits.  BILE DUCTS: Normal caliber.  GALLBLADDER: Within normal limits.  SPLEEN: Within normal limits.  PANCREAS: A hypodense lesion adjacent/within the second portion of the   duodenum near the uncinate process of pancreas measuring up to 5.6 x 4.3   cm with a focal hyperdense lesion.  ADRENALS: Within normal limits.  KIDNEYS/URETERS: A heterogeneous right renal mass measuring3.8 cm   concerning for neoplasm.    BLADDER: Within normal limits.  REPRODUCTIVE ORGANS: Prostate is mildly enlarged.    BOWEL: A hypodense lesion adjacent/within the second portion of the   duodenum near the uncinate process of pancreas measuring up to 5.6 x 4.3   cm with a focal hyperdense lesion. No bowel obstruction. Status post   appendectomy.  PERITONEUM: No ascites.  VESSELS:  Within normal limits.  RETROPERITONEUM: No lymphadenopathy.    ABDOMINAL WALL: Within normal limits.  BONES: Chronic healed right 10th rib fracture.    IMPRESSION:     Motion limited examination.     A heterogeneous right renal mass measuring 0.8 cm concerning for neoplasm.    A hypodense mass in the mid abdomen adjacent to second portion of   duodenum and uncinate process of the pancreas with focal hyperdense   lesion. Recommend further evaluation with MR study.    MR Abdomen w/wo IV Cont (01.01.19 @ 18:40)   LOWER CHEST: Cardiomegaly.    LIVER: Within normal limits.   BILE DUCTS: Normal caliber.  GALLBLADDER: Within normal limits.  SPLEEN: Within normal limits.  PANCREAS: A 5.5 x 4.3 x 4.3 cm heterogeneous lesion in the uncinate   process with T1 hyperintense components and no definite enhancement. A   hyperdense or enhancing component seen on the prior CT is no longer   visualized. There is no pancreatic ductal dilatation or cutoff.   ADRENALS: Within normal limits.  KIDNEYS/URETERS: Redemonstration of 4.9 cm heterogeneously enhancing   right lower renal pole mass. No hydronephrosis.    VISUALIZED PORTIONS:    BOWEL: Within normal limits.   PERITONEUM: No ascites.  VESSELS: Within normal limits.  RETROPERITONEUM: No lymphadenopathy.    ABDOMINAL WALL: Within normal limits.  BONES: Within normal limits.    IMPRESSION:   A 4.9 cm right lower renalpole mass compatible with RCC.    A 5.5 cm lesion in the uncinate process of the pancreas suggestive of   hematoma. An enhancing or hyperdense component seen on the recent CT is   no longer visualized. General Surgery Consult      Consulting surgical team: Vascular  Consulting attending: Dr. Benson      HPI: Eyad Angel is a 78 y.o. man with history of Afib (Coumadin and Digoxin), HTN, HLD, DM, BPH, Asthma and gout who presented to the ED on 12/30 for rectal bleeding (bright red), which began on the AM of 12/30. At the time of presentation, the patient also complained of intermittent nausea, dizziness, and RUQ abdominal pain.     INR at the time of presentation was 3.25.     Upon interview, the patient denies any history of rectal bleeding or history of similar abdominal pain. The patient denies any history of abdominal pain with eating. Patient denies any associated fever, chills, weakness, or fatigue. However, he does states that he lost approximately 30 pounds in the last 3-4 months and that his appetite has been less than normal.     Patient denies any history of tobacco use and only drinks occasionally     PAST MEDICAL HISTORY:  Alzheimer's dementia  AF (atrial fibrillation)  DM (diabetes mellitus)  HLD (hyperlipidemia)  GI bleed  GERD (gastroesophageal reflux disease)  Arthritis  Hyperlipidemia  Gout  BPH (benign prostatic hyperplasia)  Afib  HTN (hypertension)      PAST SURGICAL HISTORY:  S/P hernia repair  S/P cataract surgery  S/P knee replacement, bilateral      MEDICATIONS:  allopurinol 300 milliGRAM(s) Oral at bedtime  atorvastatin 20 milliGRAM(s) Oral at bedtime  carvedilol 25 milliGRAM(s) Oral every 12 hours  dextrose 40% Gel 15 Gram(s) Oral once PRN  dextrose 5%. 1000 milliLiter(s) IV Continuous <Continuous>  dextrose 50% Injectable 12.5 Gram(s) IV Push once  dextrose 50% Injectable 25 Gram(s) IV Push once  dextrose 50% Injectable 25 Gram(s) IV Push once  digoxin     Tablet 0.25 milliGRAM(s) Oral daily  glucagon  Injectable 1 milliGRAM(s) IntraMuscular once PRN  insulin lispro (HumaLOG) corrective regimen sliding scale   SubCutaneous three times a day before meals  insulin lispro (HumaLOG) corrective regimen sliding scale   SubCutaneous at bedtime  losartan 50 milliGRAM(s) Oral two times a day  montelukast 10 milliGRAM(s) Oral at bedtime  pantoprazole    Tablet 40 milliGRAM(s) Oral before breakfast  tamsulosin 0.4 milliGRAM(s) Oral at bedtime      ALLERGIES:  penicillin (Unknown)      VITALS & I/Os:  Vital Signs Last 24 Hrs  T(C): 36.8 (03 Jan 2019 10:07), Max: 36.9 (02 Jan 2019 19:59)  T(F): 98.3 (03 Jan 2019 10:07), Max: 98.5 (03 Jan 2019 04:45)  HR: 82 (03 Jan 2019 10:07) (80 - 85)  BP: 128/78 (03 Jan 2019 10:07) (128/78 - 146/92)  BP(mean): --  RR: 18 (03 Jan 2019 10:07) (18 - 18)  SpO2: 98% (03 Jan 2019 10:07) (95% - 98%)    I&O's Summary    02 Jan 2019 07:01  -  03 Jan 2019 07:00  --------------------------------------------------------  IN: 1430 mL / OUT: 0 mL / NET: 1430 mL    03 Jan 2019 07:01  -  03 Jan 2019 11:03  --------------------------------------------------------  IN: 240 mL / OUT: 0 mL / NET: 240 mL        PHYSICAL EXAM:  General: No acute distress  Respiratory: Nonlabored  Cardiovascular: normotensive, regular rate   Abdominal: Soft, nondistended, nontender. No rebound or guarding. No organomegaly, no palpable mass.  Extremities: Warm, well perfused     LABS:                        12.9   8.08  )-----------( 162      ( 03 Jan 2019 08:52 )             39.7     01-03    138  |  101  |  8   ----------------------------<  128<H>  3.7   |  26  |  0.68    Ca    8.6      03 Jan 2019 07:14      Lactate:    PT/INR - ( 03 Jan 2019 08:52 )   PT: 17.6 sec;   INR: 1.55 ratio        IMAGING:  Colonoscopy (01.02.19 @ 08:37)   - A 5 mm polyp was found in the sigmoid colon. The polyp was sessile. The polyp was removed with a cold biopsy forceps. Resection and retrieval were complete.                                                                                                      Impression:            - One 5 mm polyp in the sigmoid colon, removed with a cold biopsy forceps. Resected and retrieved.    Upper Endoscopy (01.02.19 @ 09:05)   - The esophagus was normal.  - The stomach was normal. mild gastritis, bx  - The examined duodenum was normal.                                                                                                     Impression:            - Normal esophagus.  - Normal stomach.  - Normal examined duodenum.  - mild gastritis, bx    Surgical Pathology Report (01.02.19 @ 09:30)  1. Stomach, antrum, biopsy:  - Gastric antral and oxyntic mucosa with no significant  histopathologic findings.  - Negative for Helicobacter pylori (special stain).  - Negative for intestinal metaplasia.    2. Colon, sigmoid, polyp, biopsy:  - Hyperplastic polyp.    CT Abdomen and Pelvis w/ Oral Cont and w/ IV Cont (12.30.18 @ 16:26)   LOWER CHEST: Calcific atherosclerosis of aorta and coronary arteries.   Aortic valve calcification. Mild cardiomegaly.    LIVER: Within normal limits.  BILE DUCTS: Normal caliber.  GALLBLADDER: Within normal limits.  SPLEEN: Within normal limits.  PANCREAS: A hypodense lesion adjacent/within the second portion of the   duodenum near the uncinate process of pancreas measuring up to 5.6 x 4.3   cm with a focal hyperdense lesion.  ADRENALS: Within normal limits.  KIDNEYS/URETERS: A heterogeneous right renal mass measuring3.8 cm   concerning for neoplasm.    BLADDER: Within normal limits.  REPRODUCTIVE ORGANS: Prostate is mildly enlarged.    BOWEL: A hypodense lesion adjacent/within the second portion of the   duodenum near the uncinate process of pancreas measuring up to 5.6 x 4.3   cm with a focal hyperdense lesion. No bowel obstruction. Status post   appendectomy.  PERITONEUM: No ascites.  VESSELS:  Within normal limits.  RETROPERITONEUM: No lymphadenopathy.    ABDOMINAL WALL: Within normal limits.  BONES: Chronic healed right 10th rib fracture.    IMPRESSION:     Motion limited examination.     A heterogeneous right renal mass measuring 0.8 cm concerning for neoplasm.    A hypodense mass in the mid abdomen adjacent to second portion of   duodenum and uncinate process of the pancreas with focal hyperdense   lesion. Recommend further evaluation with MR study.    MR Abdomen w/wo IV Cont (01.01.19 @ 18:40)   LOWER CHEST: Cardiomegaly.    LIVER: Within normal limits.   BILE DUCTS: Normal caliber.  GALLBLADDER: Within normal limits.  SPLEEN: Within normal limits.  PANCREAS: A 5.5 x 4.3 x 4.3 cm heterogeneous lesion in the uncinate   process with T1 hyperintense components and no definite enhancement. A   hyperdense or enhancing component seen on the prior CT is no longer   visualized. There is no pancreatic ductal dilatation or cutoff.   ADRENALS: Within normal limits.  KIDNEYS/URETERS: Redemonstration of 4.9 cm heterogeneously enhancing   right lower renal pole mass. No hydronephrosis.    VISUALIZED PORTIONS:    BOWEL: Within normal limits.   PERITONEUM: No ascites.  VESSELS: Within normal limits.  RETROPERITONEUM: No lymphadenopathy.    ABDOMINAL WALL: Within normal limits.  BONES: Within normal limits.    IMPRESSION:   A 4.9 cm right lower renalpole mass compatible with RCC.    A 5.5 cm lesion in the uncinate process of the pancreas suggestive of   hematoma. An enhancing or hyperdense component seen on the recent CT is   no longer visualized.

## 2019-01-03 NOTE — PROGRESS NOTE ADULT - ASSESSMENT
The patient is a lb 78-year-old gentleman with past medical history of diabetes, hypertension, atrial fibrillation, presents for evaluation of bright red blood per rectum.  The patient has a 3.8 cm right renal lesion concerning for malignancy.        1.  :  He needs to follow up with Urology as an outpt for likely subtotal nephroectomy  2.  GI:  Awaiting bx results  3. CVS-Not in heart failure

## 2019-01-03 NOTE — PROGRESS NOTE ADULT - ASSESSMENT
Pt is a 77yo male with PMHx of Atrial fibrillation on Coumadin and Digoxin, HTN, HLD, DM, BPH, Asthma and gout presents to ED c/o rectal bleeding (bright red) since this morning.            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

## 2019-01-03 NOTE — PROGRESS NOTE ADULT - SUBJECTIVE AND OBJECTIVE BOX
STEFAN DURAND:[id number],   78yMale followed for:  penicillin (Unknown)    PAST MEDICAL & SURGICAL HISTORY:  Alzheimer's dementia  AF (atrial fibrillation)  DM (diabetes mellitus)  HLD (hyperlipidemia)  GI bleed  GERD (gastroesophageal reflux disease)  Gout  BPH (benign prostatic hyperplasia)  HTN (hypertension)  S/P hernia repair  S/P cataract surgery  S/P knee replacement, bilateral    FAMILY HISTORY:    MEDICATIONS  (STANDING):  allopurinol 300 milliGRAM(s) Oral at bedtime  atorvastatin 20 milliGRAM(s) Oral at bedtime  carvedilol 25 milliGRAM(s) Oral every 12 hours  dextrose 5%. 1000 milliLiter(s) (50 mL/Hr) IV Continuous <Continuous>  dextrose 50% Injectable 12.5 Gram(s) IV Push once  dextrose 50% Injectable 25 Gram(s) IV Push once  dextrose 50% Injectable 25 Gram(s) IV Push once  digoxin     Tablet 0.25 milliGRAM(s) Oral daily  insulin lispro (HumaLOG) corrective regimen sliding scale   SubCutaneous three times a day before meals  insulin lispro (HumaLOG) corrective regimen sliding scale   SubCutaneous at bedtime  losartan 50 milliGRAM(s) Oral two times a day  montelukast 10 milliGRAM(s) Oral at bedtime  pantoprazole    Tablet 40 milliGRAM(s) Oral before breakfast  tamsulosin 0.4 milliGRAM(s) Oral at bedtime    MEDICATIONS  (PRN):  dextrose 40% Gel 15 Gram(s) Oral once PRN Blood Glucose LESS THAN 70 milliGRAM(s)/deciliter  glucagon  Injectable 1 milliGRAM(s) IntraMuscular once PRN Glucose LESS THAN 70 milligrams/deciliter      Vital Signs Last 24 Hrs  T(C): 36.8 (03 Jan 2019 10:07), Max: 36.9 (02 Jan 2019 19:59)  T(F): 98.3 (03 Jan 2019 10:07), Max: 98.5 (03 Jan 2019 04:45)  HR: 82 (03 Jan 2019 10:07) (80 - 85)  BP: 128/78 (03 Jan 2019 10:07) (128/78 - 146/92)  BP(mean): --  RR: 18 (03 Jan 2019 10:07) (18 - 18)  SpO2: 98% (03 Jan 2019 10:07) (95% - 98%)  nc/at  s1s2  cta  soft, nt, nd no guarding or rebound  no c/c/e    CBC Full  -  ( 03 Jan 2019 08:52 )  WBC Count : 8.08 K/uL  Hemoglobin : 12.9 g/dL  Hematocrit : 39.7 %  Platelet Count - Automated : 162 K/uL  Mean Cell Volume : 94.5 fl  Mean Cell Hemoglobin : 30.7 pg  Mean Cell Hemoglobin Concentration : 32.5 gm/dL  Auto Neutrophil # : x  Auto Lymphocyte # : x  Auto Monocyte # : x  Auto Eosinophil # : x  Auto Basophil # : x  Auto Neutrophil % : x  Auto Lymphocyte % : x  Auto Monocyte % : x  Auto Eosinophil % : x  Auto Basophil % : x    01-03    138  |  101  |  8   ----------------------------<  128<H>  3.7   |  26  |  0.68    Ca    8.6      03 Jan 2019 07:14      PT/INR - ( 03 Jan 2019 08:52 )   PT: 17.6 sec;   INR: 1.55 ratio

## 2019-01-03 NOTE — PROGRESS NOTE ADULT - SUBJECTIVE AND OBJECTIVE BOX
Subjective: Patient seen and examined. No new events except as noted.   S/P Colonoscopy and EGD     REVIEW OF SYSTEMS:    CONSTITUTIONAL: No weakness, fevers or chills  EYES/ENT: No visual changes;  No vertigo or throat pain   NECK: No pain or stiffness  RESPIRATORY: No cough, wheezing, hemoptysis; No shortness of breath  CARDIOVASCULAR: No chest pain or palpitations  GASTROINTESTINAL: No abdominal or epigastric pain. No nausea, vomiting, or hematemesis; No diarrhea or constipation. No melena or hematochezia.  GENITOURINARY: No dysuria, frequency or hematuria  NEUROLOGICAL: No numbness or weakness  SKIN: No itching, burning, rashes, or lesions   All other review of systems is negative unless indicated above.    MEDICATIONS:  MEDICATIONS  (STANDING):  allopurinol 300 milliGRAM(s) Oral at bedtime  atorvastatin 20 milliGRAM(s) Oral at bedtime  carvedilol 25 milliGRAM(s) Oral every 12 hours  dextrose 5%. 1000 milliLiter(s) (50 mL/Hr) IV Continuous <Continuous>  dextrose 50% Injectable 12.5 Gram(s) IV Push once  dextrose 50% Injectable 25 Gram(s) IV Push once  dextrose 50% Injectable 25 Gram(s) IV Push once  digoxin     Tablet 0.25 milliGRAM(s) Oral daily  insulin lispro (HumaLOG) corrective regimen sliding scale   SubCutaneous three times a day before meals  insulin lispro (HumaLOG) corrective regimen sliding scale   SubCutaneous at bedtime  losartan 50 milliGRAM(s) Oral two times a day  montelukast 10 milliGRAM(s) Oral at bedtime  pantoprazole    Tablet 40 milliGRAM(s) Oral before breakfast  tamsulosin 0.4 milliGRAM(s) Oral at bedtime      PHYSICAL EXAM:  T(C): 36.9 (01-03-19 @ 20:44), Max: 36.9 (01-03-19 @ 04:45)  HR: 92 (01-03-19 @ 20:44) (80 - 92)  BP: 154/85 (01-03-19 @ 20:44) (128/78 - 154/85)  RR: 18 (01-03-19 @ 20:44) (18 - 18)  SpO2: 97% (01-03-19 @ 20:44) (96% - 98%)  Wt(kg): --  I&O's Summary    02 Jan 2019 07:01  -  03 Jan 2019 07:00  --------------------------------------------------------  IN: 1430 mL / OUT: 0 mL / NET: 1430 mL    03 Jan 2019 07:01  -  03 Jan 2019 22:14  --------------------------------------------------------  IN: 1020 mL / OUT: 0 mL / NET: 1020 mL          Appearance: Normal	  HEENT:   Normal oral mucosa, PERRL, EOMI	  Lymphatic: No lymphadenopathy , no edema  Cardiovascular: Normal S1 S2, No JVD, No murmurs , Peripheral pulses palpable 2+ bilaterally  Respiratory: Lungs clear to auscultation, normal effort 	  Gastrointestinal:  Soft, Non-tender, + BS	  Skin: No rashes, No ecchymoses, No cyanosis, warm to touch  Musculoskeletal: Normal range of motion, normal strength  Psychiatry:  Mood & affect appropriate  Ext: No edema      All labs, Imaging and EKGs personally reviewed                           12.9   8.08  )-----------( 162      ( 03 Jan 2019 08:52 )             39.7               01-03    138  |  101  |  8   ----------------------------<  128<H>  3.7   |  26  |  0.68    Ca    8.6      03 Jan 2019 07:14      PT/INR - ( 03 Jan 2019 08:52 )   PT: 17.6 sec;   INR: 1.55 ratio              < from: CT Angio Abdomen and Pelvis w/ IV Cont (01.03.19 @ 17:23) >  INTERPRETATION:  No urgent/emergent findings.    Redemonstrated 4.9 cm right lower pole renal mass.    Redemonstrated 5.5 cm hypodense lesion adjacent to the second portion of   the duodenum/pancreatic uncinate process. An internal focus of gas   (series 7, image 67) is new from 12/30/2018. The previously seen focal   hyperdense lesion within this hypodense lesion does not enhance on the   current examination.

## 2019-01-03 NOTE — CONSULT NOTE ADULT - ASSESSMENT
Eyad Angel is a 78 y.o. man with history of Afib (Coumadin and Digoxin), HTN, HLD, DM, BPH, Asthma and gout who presented to the ED on 12/30 for rectal bleeding (bright red), which began on the AM of 12/30. At the time of presentation, the patient also complained of intermittent nausea, dizziness, and RUQ abdominal pain. On imaging, found to have a right renal mass and uncinate pancreas lesion concerning for hematoma, which prompted vascular consultation.     Plan:   - CT with pancreas protocol pending  - Will follow results  - Given renal mass and recent weight loss, concern for malignancy    Carrillo Sandy, PGY2  [phone]

## 2019-01-03 NOTE — PROGRESS NOTE ADULT - SUBJECTIVE AND OBJECTIVE BOX
Subjective: Patient seen and examined. No new events except as noted.     SUBJECTIVE/ROS:  feels ok       MEDICATIONS:  MEDICATIONS  (STANDING):  allopurinol 300 milliGRAM(s) Oral at bedtime  atorvastatin 20 milliGRAM(s) Oral at bedtime  carvedilol 25 milliGRAM(s) Oral every 12 hours  dextrose 5%. 1000 milliLiter(s) (50 mL/Hr) IV Continuous <Continuous>  dextrose 50% Injectable 12.5 Gram(s) IV Push once  dextrose 50% Injectable 25 Gram(s) IV Push once  dextrose 50% Injectable 25 Gram(s) IV Push once  digoxin     Tablet 0.25 milliGRAM(s) Oral daily  insulin lispro (HumaLOG) corrective regimen sliding scale   SubCutaneous three times a day before meals  insulin lispro (HumaLOG) corrective regimen sliding scale   SubCutaneous at bedtime  losartan 50 milliGRAM(s) Oral two times a day  montelukast 10 milliGRAM(s) Oral at bedtime  pantoprazole    Tablet 40 milliGRAM(s) Oral before breakfast  tamsulosin 0.4 milliGRAM(s) Oral at bedtime      PHYSICAL EXAM:  T(C): 36.9 (01-03-19 @ 04:45), Max: 36.9 (01-02-19 @ 19:59)  HR: 82 (01-03-19 @ 04:45) (80 - 85)  BP: 137/84 (01-03-19 @ 04:45) (134/82 - 146/92)  RR: 18 (01-03-19 @ 04:45) (18 - 18)  SpO2: 96% (01-03-19 @ 04:45) (95% - 98%)  Wt(kg): --  I&O's Summary    02 Jan 2019 07:01  -  03 Jan 2019 07:00  --------------------------------------------------------  IN: 1430 mL / OUT: 0 mL / NET: 1430 mL      JVP: Normal  Neck: supple  Lung: clear   CV: S1 S2 , Murmur:  Abd: soft  Ext: No edema  neuro: Awake / alert  Psych: flat affect  Skin: normal      LABS/DATA:    CARDIAC MARKERS:                                12.9   8.08  )-----------( 162      ( 03 Jan 2019 08:52 )             39.7     01-03    138  |  101  |  8   ----------------------------<  128<H>  3.7   |  26  |  0.68    Ca    8.6      03 Jan 2019 07:14      proBNP:   Lipid Profile:   HgA1c:   TSH:     TELE:  EKG:

## 2019-01-04 VITALS
TEMPERATURE: 98 F | HEART RATE: 84 BPM | OXYGEN SATURATION: 98 % | RESPIRATION RATE: 18 BRPM | DIASTOLIC BLOOD PRESSURE: 87 MMHG | SYSTOLIC BLOOD PRESSURE: 134 MMHG

## 2019-01-04 LAB
HCT VFR BLD CALC: 41.5 % — SIGNIFICANT CHANGE UP (ref 39–50)
HGB BLD-MCNC: 13.7 G/DL — SIGNIFICANT CHANGE UP (ref 13–17)
MCHC RBC-ENTMCNC: 31.1 PG — SIGNIFICANT CHANGE UP (ref 27–34)
MCHC RBC-ENTMCNC: 33 GM/DL — SIGNIFICANT CHANGE UP (ref 32–36)
MCV RBC AUTO: 94.3 FL — SIGNIFICANT CHANGE UP (ref 80–100)
PLATELET # BLD AUTO: 180 K/UL — SIGNIFICANT CHANGE UP (ref 150–400)
RBC # BLD: 4.4 M/UL — SIGNIFICANT CHANGE UP (ref 4.2–5.8)
RBC # FLD: 14.2 % — SIGNIFICANT CHANGE UP (ref 10.3–14.5)
WBC # BLD: 8.79 K/UL — SIGNIFICANT CHANGE UP (ref 3.8–10.5)
WBC # FLD AUTO: 8.79 K/UL — SIGNIFICANT CHANGE UP (ref 3.8–10.5)

## 2019-01-04 PROCEDURE — 80061 LIPID PANEL: CPT

## 2019-01-04 PROCEDURE — 82435 ASSAY OF BLOOD CHLORIDE: CPT

## 2019-01-04 PROCEDURE — 82947 ASSAY GLUCOSE BLOOD QUANT: CPT

## 2019-01-04 PROCEDURE — 88312 SPECIAL STAINS GROUP 1: CPT

## 2019-01-04 PROCEDURE — 84295 ASSAY OF SERUM SODIUM: CPT

## 2019-01-04 PROCEDURE — 86850 RBC ANTIBODY SCREEN: CPT

## 2019-01-04 PROCEDURE — A9585: CPT

## 2019-01-04 PROCEDURE — 80048 BASIC METABOLIC PNL TOTAL CA: CPT

## 2019-01-04 PROCEDURE — 96360 HYDRATION IV INFUSION INIT: CPT

## 2019-01-04 PROCEDURE — 83036 HEMOGLOBIN GLYCOSYLATED A1C: CPT

## 2019-01-04 PROCEDURE — 83605 ASSAY OF LACTIC ACID: CPT

## 2019-01-04 PROCEDURE — 84443 ASSAY THYROID STIM HORMONE: CPT

## 2019-01-04 PROCEDURE — 82803 BLOOD GASES ANY COMBINATION: CPT

## 2019-01-04 PROCEDURE — 85014 HEMATOCRIT: CPT

## 2019-01-04 PROCEDURE — 96361 HYDRATE IV INFUSION ADD-ON: CPT

## 2019-01-04 PROCEDURE — 86900 BLOOD TYPING SEROLOGIC ABO: CPT

## 2019-01-04 PROCEDURE — 83690 ASSAY OF LIPASE: CPT

## 2019-01-04 PROCEDURE — 88305 TISSUE EXAM BY PATHOLOGIST: CPT

## 2019-01-04 PROCEDURE — 85610 PROTHROMBIN TIME: CPT

## 2019-01-04 PROCEDURE — 74177 CT ABD & PELVIS W/CONTRAST: CPT

## 2019-01-04 PROCEDURE — 85027 COMPLETE CBC AUTOMATED: CPT

## 2019-01-04 PROCEDURE — 84132 ASSAY OF SERUM POTASSIUM: CPT

## 2019-01-04 PROCEDURE — 80162 ASSAY OF DIGOXIN TOTAL: CPT

## 2019-01-04 PROCEDURE — 74174 CTA ABD&PLVS W/CONTRAST: CPT

## 2019-01-04 PROCEDURE — 81001 URINALYSIS AUTO W/SCOPE: CPT

## 2019-01-04 PROCEDURE — 85730 THROMBOPLASTIN TIME PARTIAL: CPT

## 2019-01-04 PROCEDURE — 82565 ASSAY OF CREATININE: CPT

## 2019-01-04 PROCEDURE — 80053 COMPREHEN METABOLIC PANEL: CPT

## 2019-01-04 PROCEDURE — 76770 US EXAM ABDO BACK WALL COMP: CPT

## 2019-01-04 PROCEDURE — 82962 GLUCOSE BLOOD TEST: CPT

## 2019-01-04 PROCEDURE — 71045 X-RAY EXAM CHEST 1 VIEW: CPT

## 2019-01-04 PROCEDURE — 99285 EMERGENCY DEPT VISIT HI MDM: CPT | Mod: 25

## 2019-01-04 PROCEDURE — 86901 BLOOD TYPING SEROLOGIC RH(D): CPT

## 2019-01-04 PROCEDURE — 74183 MRI ABD W/O CNTR FLWD CNTR: CPT

## 2019-01-04 PROCEDURE — 82330 ASSAY OF CALCIUM: CPT

## 2019-01-04 RX ORDER — PANTOPRAZOLE SODIUM 20 MG/1
1 TABLET, DELAYED RELEASE ORAL
Qty: 30 | Refills: 0
Start: 2019-01-04 | End: 2019-02-02

## 2019-01-04 RX ORDER — CARVEDILOL PHOSPHATE 80 MG/1
1 CAPSULE, EXTENDED RELEASE ORAL
Qty: 60 | Refills: 0
Start: 2019-01-04 | End: 2019-02-02

## 2019-01-04 RX ORDER — LOSARTAN POTASSIUM 100 MG/1
1 TABLET, FILM COATED ORAL
Qty: 60 | Refills: 0
Start: 2019-01-04 | End: 2019-02-02

## 2019-01-04 RX ADMIN — LOSARTAN POTASSIUM 50 MILLIGRAM(S): 100 TABLET, FILM COATED ORAL at 05:16

## 2019-01-04 RX ADMIN — PANTOPRAZOLE SODIUM 40 MILLIGRAM(S): 20 TABLET, DELAYED RELEASE ORAL at 06:00

## 2019-01-04 RX ADMIN — Medication 0.25 MILLIGRAM(S): at 05:16

## 2019-01-04 RX ADMIN — CARVEDILOL PHOSPHATE 25 MILLIGRAM(S): 80 CAPSULE, EXTENDED RELEASE ORAL at 09:34

## 2019-01-04 RX ADMIN — Medication 1: at 08:19

## 2019-01-04 NOTE — PHARMACOTHERAPY INTERVENTION NOTE - COMMENTS
Spoke to patient about indication, directions, and side effect profile of medications. Informed that warfarin was discontinued and pantoprazole is a new medication for GIB.    Peter Hudson, PharmD  [phone]

## 2019-01-04 NOTE — PROGRESS NOTE ADULT - ASSESSMENT
Eyad Angel is a 78 y.o. man with history of Afib (Coumadin and Digoxin), HTN, HLD, DM, BPH, Asthma and gout who presented to the ED on 12/30 for rectal bleeding (bright red), which began on the AM of 12/30. At the time of presentation, the patient also complained of intermittent nausea, dizziness, and RUQ abdominal pain. On imaging, found to have a right renal mass and uncinate pancreas lesion concerning for hematoma, which prompted vascular consultation.     Plan:   - CT with pancreas protocol pending; preliminary report does not show hematoma or aneurysm   - Will follow results  - Given renal mass and recent weight loss, concern for malignancy; consider surgical oncology consultation pending final read of CT    Carrillo Sandy, PGY2  [phone]

## 2019-01-04 NOTE — PROGRESS NOTE ADULT - SUBJECTIVE AND OBJECTIVE BOX
Vascular Surgery Consult - Daily Progress Note    SUBJECTIVE:  Doing well.   No overnight events.   Complains of some RUQ pain.     OBJECTIVE:     ** VITAL SIGNS / I&O's **    T(C): 36.9 (01-04-19 @ 04:22), Max: 36.9 (01-03-19 @ 20:44)  T(F): 98.5 (01-04-19 @ 04:22), Max: 98.5 (01-04-19 @ 04:22)  HR: 79 (01-04-19 @ 04:22) (79 - 92)  BP: 140/86 (01-04-19 @ 04:22) (128/78 - 154/85)  RR: 18 (01-04-19 @ 04:22) (18 - 18)  SpO2: 96% (01-04-19 @ 04:22) (96% - 98%)      03 Jan 2019 07:01  -  04 Jan 2019 07:00  --------------------------------------------------------  IN:    Oral Fluid: 1260 mL  Total IN: 1260 mL    OUT:  Total OUT: 0 mL    Total NET: 1260 mL    ** PHYSICAL EXAM **    General: No acute distress  Respiratory: Nonlabored  Cardiovascular: normotensive, regular rate   Abdominal: Soft, nondistended, nontender. No rebound or guarding. No organomegaly, no palpable mass.  Extremities: Warm, well perfused     ** LABS **  CBC (01-03 @ 08:52)                              12.9<L>                         8.08    )----------------(  162        --    % Neutrophils, --    % Lymphocytes, ANC: --                                  39.7                  BMP (01-03 @ 07:14)             138     |  101     |  8     		Ca++ --      Ca 8.6                ---------------------------------( 128<H>		Mg --                 3.7     |  26      |  0.68  			Ph --          Coags (01-03 @ 08:52)  aPTT -- / INR 1.55<H> / PT 17.6<H>    **RADS**  CT Abdomen and Pelvis w/ Oral Cont and w/ IV Cont (12.30.18 @ 16:26)   LOWER CHEST: Calcific atherosclerosis of aorta and coronary arteries.   Aortic valve calcification. Mild cardiomegaly.    LIVER: Within normal limits.  BILE DUCTS: Normal caliber.  GALLBLADDER: Within normal limits.  SPLEEN: Within normal limits.  PANCREAS: A hypodense lesion adjacent/within the second portion of the   duodenum near the uncinate process of pancreas measuring up to 5.6 x 4.3   cm with a focal hyperdense lesion.  ADRENALS: Within normal limits.  KIDNEYS/URETERS: A heterogeneous right renal mass measuring3.8 cm   concerning for neoplasm.    BLADDER: Within normal limits.  REPRODUCTIVE ORGANS: Prostate is mildly enlarged.    BOWEL: A hypodense lesion adjacent/within the second portion of the   duodenum near the uncinate process of pancreas measuring up to 5.6 x 4.3   cm with a focal hyperdense lesion. No bowel obstruction. Status post   appendectomy.  PERITONEUM: No ascites.  VESSELS:  Within normal limits.  RETROPERITONEUM: No lymphadenopathy.    ABDOMINAL WALL: Within normal limits.  BONES: Chronic healed right 10th rib fracture.    IMPRESSION:     Motion limited examination.     A heterogeneous right renal mass measuring 0.8 cm concerning for neoplasm.    A hypodense mass in the mid abdomen adjacent to second portion of   duodenum and uncinate process of the pancreas with focal hyperdense   lesion. Recommend further evaluation with MR study.    MR Abdomen w/wo IV Cont (01.01.19 @ 18:40)   LOWER CHEST: Cardiomegaly.    LIVER: Within normal limits.   BILE DUCTS: Normal caliber.  GALLBLADDER: Within normal limits.  SPLEEN: Within normal limits.  PANCREAS: A 5.5 x 4.3 x 4.3 cm heterogeneous lesion in the uncinate   process with T1 hyperintense components and no definite enhancement. A   hyperdense or enhancing component seen on the prior CT is no longer   visualized. There is no pancreatic ductal dilatation or cutoff.   ADRENALS: Within normal limits.  KIDNEYS/URETERS: Redemonstration of 4.9 cm heterogeneously enhancing   right lower renal pole mass. No hydronephrosis.    VISUALIZED PORTIONS:    BOWEL: Within normal limits.   PERITONEUM: No ascites.  VESSELS: Within normal limits.  RETROPERITONEUM: No lymphadenopathy.    ABDOMINAL WALL: Within normal limits.  BONES: Within normal limits.    IMPRESSION:   A 4.9 cm right lower renalpole mass compatible with RCC.    A 5.5 cm lesion in the uncinate process of the pancreas suggestive of   hematoma. An enhancing or hyperdense component seen on the recent CT is   no longer visualized.

## 2019-01-04 NOTE — PROGRESS NOTE ADULT - SUBJECTIVE AND OBJECTIVE BOX
Subjective: Patient seen and examined. No new events except as noted.     SUBJECTIVE/ROS:  No chest pain, dyspnea, palpitation, or dizziness.       MEDICATIONS:  MEDICATIONS  (STANDING):  allopurinol 300 milliGRAM(s) Oral at bedtime  atorvastatin 20 milliGRAM(s) Oral at bedtime  carvedilol 25 milliGRAM(s) Oral every 12 hours  dextrose 5%. 1000 milliLiter(s) (50 mL/Hr) IV Continuous <Continuous>  dextrose 50% Injectable 12.5 Gram(s) IV Push once  dextrose 50% Injectable 25 Gram(s) IV Push once  dextrose 50% Injectable 25 Gram(s) IV Push once  digoxin     Tablet 0.25 milliGRAM(s) Oral daily  insulin lispro (HumaLOG) corrective regimen sliding scale   SubCutaneous three times a day before meals  insulin lispro (HumaLOG) corrective regimen sliding scale   SubCutaneous at bedtime  losartan 50 milliGRAM(s) Oral two times a day  montelukast 10 milliGRAM(s) Oral at bedtime  pantoprazole    Tablet 40 milliGRAM(s) Oral before breakfast  tamsulosin 0.4 milliGRAM(s) Oral at bedtime      PHYSICAL EXAM:  T(C): 36.6 (01-04-19 @ 09:32), Max: 36.9 (01-03-19 @ 20:44)  HR: 84 (01-04-19 @ 09:32) (79 - 92)  BP: 134/87 (01-04-19 @ 09:32) (134/87 - 154/85)  RR: 18 (01-04-19 @ 09:32) (18 - 18)  SpO2: 98% (01-04-19 @ 09:32) (96% - 98%)  Wt(kg): --  I&O's Summary    03 Jan 2019 07:01  -  04 Jan 2019 07:00  --------------------------------------------------------  IN: 1260 mL / OUT: 0 mL / NET: 1260 mL          Appearance: Normal	  HEENT:   no gross abnormality   Cardiovascular: Normal S1 S2,    Murmur:   Neck: JVP normal  Respiratory: Lungs clear   Gastrointestinal:  Soft, Non-tender  Skin: normal   Neuro: No gross deficits.   Psychiatry:  Mood & affect flat  Ext: No edema      LABS/DATA:    CARDIAC MARKERS:                                13.7   8.79  )-----------( 180      ( 04 Jan 2019 08:15 )             41.5     01-03    138  |  101  |  8   ----------------------------<  128<H>  3.7   |  26  |  0.68    Ca    8.6      03 Jan 2019 07:14      proBNP:   Lipid Profile:   HgA1c:   TSH:     TELE:  EKG:

## 2019-01-08 ENCOUNTER — TRANSCRIPTION ENCOUNTER (OUTPATIENT)
Age: 79
End: 2019-01-08

## 2019-01-11 ENCOUNTER — APPOINTMENT (OUTPATIENT)
Dept: UROLOGY | Facility: CLINIC | Age: 79
End: 2019-01-11
Payer: MEDICARE

## 2019-01-11 DIAGNOSIS — N28.89 OTHER SPECIFIED DISORDERS OF KIDNEY AND URETER: ICD-10-CM

## 2019-01-11 PROCEDURE — 99214 OFFICE O/P EST MOD 30 MIN: CPT

## 2019-01-11 NOTE — REVIEW OF SYSTEMS
[Chest Pain] : chest pain [Shortness Of Breath] : shortness of breath [Wheezing] : wheezing [Itching] : itching [Dizziness] : dizziness [Difficulty Walking] : difficulty walking [Feelings Of Weakness] : feelings of weakness [Negative] : Heme/Lymph

## 2019-01-14 NOTE — HISTORY OF PRESENT ILLNESS
[FreeTextEntry1] : Patient following up for recent incidental finding of right renal mass.Admitted for w/u and treatment upper GI bleed. 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 \par he has had umbilical and inguinal hernia repairs.

## 2019-01-14 NOTE — ASSESSMENT
[FreeTextEntry1] : reviewed CT scan with patient and familyt member.\par discussed surgical options of Lap Partial V lap Nx - realizing his age, cardiac issues and normal GFR\par I would favor nephrectomy though partial feasible.\par Will review with his family.

## 2019-01-14 NOTE — PHYSICAL EXAM
[General Appearance - Well Developed] : well developed [General Appearance - Well Nourished] : well nourished [Edema] : no peripheral edema [Exaggerated Use Of Accessory Muscles For Inspiration] : no accessory muscle use [Abdomen Soft] : soft [Abdomen Tenderness] : non-tender [Abdomen Mass (___ Cm)] : no abdominal mass palpated [Normal Station and Gait] : the gait and station were normal for the patient's age [] : no rash [No Focal Deficits] : no focal deficits [Oriented To Time, Place, And Person] : oriented to person, place, and time

## 2019-01-24 ENCOUNTER — APPOINTMENT (OUTPATIENT)
Dept: CT IMAGING | Facility: HOSPITAL | Age: 79
End: 2019-01-24

## 2019-01-24 ENCOUNTER — OUTPATIENT (OUTPATIENT)
Dept: OUTPATIENT SERVICES | Facility: HOSPITAL | Age: 79
LOS: 1 days | End: 2019-01-24
Payer: MEDICARE

## 2019-01-24 DIAGNOSIS — R91.8 OTHER NONSPECIFIC ABNORMAL FINDING OF LUNG FIELD: ICD-10-CM

## 2019-01-24 DIAGNOSIS — Z96.653 PRESENCE OF ARTIFICIAL KNEE JOINT, BILATERAL: Chronic | ICD-10-CM

## 2019-01-24 DIAGNOSIS — Z98.49 CATARACT EXTRACTION STATUS, UNSPECIFIED EYE: Chronic | ICD-10-CM

## 2019-01-24 DIAGNOSIS — Z98.89 OTHER SPECIFIED POSTPROCEDURAL STATES: Chronic | ICD-10-CM

## 2019-01-24 PROCEDURE — 74177 CT ABD & PELVIS W/CONTRAST: CPT

## 2019-01-24 PROCEDURE — 74177 CT ABD & PELVIS W/CONTRAST: CPT | Mod: 26

## 2019-01-31 ENCOUNTER — OUTPATIENT (OUTPATIENT)
Dept: OUTPATIENT SERVICES | Facility: HOSPITAL | Age: 79
LOS: 1 days | End: 2019-01-31
Payer: MEDICARE

## 2019-01-31 ENCOUNTER — APPOINTMENT (OUTPATIENT)
Dept: CV DIAGNOSTICS | Facility: HOSPITAL | Age: 79
End: 2019-01-31

## 2019-01-31 DIAGNOSIS — Z98.89 OTHER SPECIFIED POSTPROCEDURAL STATES: Chronic | ICD-10-CM

## 2019-01-31 DIAGNOSIS — Z98.49 CATARACT EXTRACTION STATUS, UNSPECIFIED EYE: Chronic | ICD-10-CM

## 2019-01-31 DIAGNOSIS — I25.10 ATHEROSCLEROTIC HEART DISEASE OF NATIVE CORONARY ARTERY WITHOUT ANGINA PECTORIS: ICD-10-CM

## 2019-01-31 DIAGNOSIS — Z96.653 PRESENCE OF ARTIFICIAL KNEE JOINT, BILATERAL: Chronic | ICD-10-CM

## 2019-01-31 PROCEDURE — 93018 CV STRESS TEST I&R ONLY: CPT

## 2019-01-31 PROCEDURE — 93017 CV STRESS TEST TRACING ONLY: CPT

## 2019-01-31 PROCEDURE — 93016 CV STRESS TEST SUPVJ ONLY: CPT

## 2019-01-31 PROCEDURE — 78452 HT MUSCLE IMAGE SPECT MULT: CPT

## 2019-01-31 PROCEDURE — A9500: CPT

## 2019-01-31 PROCEDURE — 78452 HT MUSCLE IMAGE SPECT MULT: CPT | Mod: 26

## 2019-02-14 ENCOUNTER — OUTPATIENT (OUTPATIENT)
Dept: OUTPATIENT SERVICES | Facility: HOSPITAL | Age: 79
LOS: 1 days | End: 2019-02-14

## 2019-02-14 VITALS
HEART RATE: 83 BPM | WEIGHT: 203.93 LBS | RESPIRATION RATE: 14 BRPM | DIASTOLIC BLOOD PRESSURE: 98 MMHG | SYSTOLIC BLOOD PRESSURE: 144 MMHG | HEIGHT: 65 IN | TEMPERATURE: 97 F

## 2019-02-14 DIAGNOSIS — I10 ESSENTIAL (PRIMARY) HYPERTENSION: ICD-10-CM

## 2019-02-14 DIAGNOSIS — N28.89 OTHER SPECIFIED DISORDERS OF KIDNEY AND URETER: ICD-10-CM

## 2019-02-14 DIAGNOSIS — E11.9 TYPE 2 DIABETES MELLITUS WITHOUT COMPLICATIONS: ICD-10-CM

## 2019-02-14 DIAGNOSIS — Z01.818 ENCOUNTER FOR OTHER PREPROCEDURAL EXAMINATION: ICD-10-CM

## 2019-02-14 DIAGNOSIS — Z98.89 OTHER SPECIFIED POSTPROCEDURAL STATES: Chronic | ICD-10-CM

## 2019-02-14 DIAGNOSIS — Z98.49 CATARACT EXTRACTION STATUS, UNSPECIFIED EYE: Chronic | ICD-10-CM

## 2019-02-14 DIAGNOSIS — Z96.653 PRESENCE OF ARTIFICIAL KNEE JOINT, BILATERAL: Chronic | ICD-10-CM

## 2019-02-14 LAB
ALBUMIN SERPL ELPH-MCNC: 4.1 G/DL — SIGNIFICANT CHANGE UP (ref 3.3–5)
ALP SERPL-CCNC: 102 U/L — SIGNIFICANT CHANGE UP (ref 40–120)
ALT FLD-CCNC: 17 U/L — SIGNIFICANT CHANGE UP (ref 4–41)
ANION GAP SERPL CALC-SCNC: 13 MMO/L — SIGNIFICANT CHANGE UP (ref 7–14)
APPEARANCE UR: CLEAR — SIGNIFICANT CHANGE UP
AST SERPL-CCNC: 27 U/L — SIGNIFICANT CHANGE UP (ref 4–40)
BACTERIA # UR AUTO: NEGATIVE — SIGNIFICANT CHANGE UP
BILIRUB SERPL-MCNC: 0.7 MG/DL — SIGNIFICANT CHANGE UP (ref 0.2–1.2)
BILIRUB UR-MCNC: NEGATIVE — SIGNIFICANT CHANGE UP
BLD GP AB SCN SERPL QL: NEGATIVE — SIGNIFICANT CHANGE UP
BLOOD UR QL VISUAL: NEGATIVE — SIGNIFICANT CHANGE UP
BUN SERPL-MCNC: 12 MG/DL — SIGNIFICANT CHANGE UP (ref 7–23)
CALCIUM SERPL-MCNC: 9.4 MG/DL — SIGNIFICANT CHANGE UP (ref 8.4–10.5)
CHLORIDE SERPL-SCNC: 101 MMOL/L — SIGNIFICANT CHANGE UP (ref 98–107)
CO2 SERPL-SCNC: 22 MMOL/L — SIGNIFICANT CHANGE UP (ref 22–31)
COLOR SPEC: YELLOW — SIGNIFICANT CHANGE UP
CREAT SERPL-MCNC: 0.76 MG/DL — SIGNIFICANT CHANGE UP (ref 0.5–1.3)
GLUCOSE SERPL-MCNC: 77 MG/DL — SIGNIFICANT CHANGE UP (ref 70–99)
GLUCOSE UR-MCNC: NEGATIVE — SIGNIFICANT CHANGE UP
HBA1C BLD-MCNC: 6.6 % — HIGH (ref 4–5.6)
HCT VFR BLD CALC: 47.1 % — SIGNIFICANT CHANGE UP (ref 39–50)
HGB BLD-MCNC: 15.3 G/DL — SIGNIFICANT CHANGE UP (ref 13–17)
HYALINE CASTS # UR AUTO: NEGATIVE — SIGNIFICANT CHANGE UP
KETONES UR-MCNC: NEGATIVE — SIGNIFICANT CHANGE UP
LEUKOCYTE ESTERASE UR-ACNC: NEGATIVE — SIGNIFICANT CHANGE UP
MCHC RBC-ENTMCNC: 30.6 PG — SIGNIFICANT CHANGE UP (ref 27–34)
MCHC RBC-ENTMCNC: 32.5 % — SIGNIFICANT CHANGE UP (ref 32–36)
MCV RBC AUTO: 94.2 FL — SIGNIFICANT CHANGE UP (ref 80–100)
NITRITE UR-MCNC: NEGATIVE — SIGNIFICANT CHANGE UP
NRBC # FLD: 0 K/UL — LOW (ref 25–125)
PH UR: 7 — SIGNIFICANT CHANGE UP (ref 5–8)
PLATELET # BLD AUTO: 176 K/UL — SIGNIFICANT CHANGE UP (ref 150–400)
PMV BLD: 9.3 FL — SIGNIFICANT CHANGE UP (ref 7–13)
POTASSIUM SERPL-MCNC: 4.2 MMOL/L — SIGNIFICANT CHANGE UP (ref 3.5–5.3)
POTASSIUM SERPL-SCNC: 4.2 MMOL/L — SIGNIFICANT CHANGE UP (ref 3.5–5.3)
PROT SERPL-MCNC: 7.2 G/DL — SIGNIFICANT CHANGE UP (ref 6–8.3)
PROT UR-MCNC: 20 — SIGNIFICANT CHANGE UP
RBC # BLD: 5 M/UL — SIGNIFICANT CHANGE UP (ref 4.2–5.8)
RBC # FLD: 13 % — SIGNIFICANT CHANGE UP (ref 10.3–14.5)
RBC CASTS # UR COMP ASSIST: SIGNIFICANT CHANGE UP (ref 0–?)
RH IG SCN BLD-IMP: POSITIVE — SIGNIFICANT CHANGE UP
SODIUM SERPL-SCNC: 136 MMOL/L — SIGNIFICANT CHANGE UP (ref 135–145)
SP GR SPEC: 1.02 — SIGNIFICANT CHANGE UP (ref 1–1.04)
SQUAMOUS # UR AUTO: SIGNIFICANT CHANGE UP
UROBILINOGEN FLD QL: NORMAL — SIGNIFICANT CHANGE UP
WBC # BLD: 5.97 K/UL — SIGNIFICANT CHANGE UP (ref 3.8–10.5)
WBC # FLD AUTO: 5.97 K/UL — SIGNIFICANT CHANGE UP (ref 3.8–10.5)
WBC UR QL: SIGNIFICANT CHANGE UP (ref 0–?)

## 2019-02-14 NOTE — H&P PST ADULT - PMH
AF (atrial fibrillation)    BPH (benign prostatic hyperplasia)    DM (diabetes mellitus)    GERD (gastroesophageal reflux disease)    GI bleed    Gout    HLD (hyperlipidemia)    HTN (hypertension) AF (atrial fibrillation)    BPH (benign prostatic hyperplasia)    DM (diabetes mellitus)    GERD (gastroesophageal reflux disease)    GI bleed    Gout    HLD (hyperlipidemia)    HTN (hypertension)    Obesity

## 2019-02-14 NOTE — H&P PST ADULT - PROBLEM SELECTOR PLAN 3
I spoke with Dr. Garces regarding /100, then 144/98.  Pt to see Dr. Garces for evaluation, requested on chart.  Cardiac testing reports requested on chart. EKG done last week by Dr. Garces, requested on chart

## 2019-02-14 NOTE — H&P PST ADULT - CARDIOVASCULAR COMMENTS
Pt with hx of atrial fibrillation.  was on Coumadin until 2 months ago, then discontinued per pt, and family

## 2019-02-14 NOTE — H&P PST ADULT - HISTORY OF PRESENT ILLNESS
79 y/o male with hx of rectal bleeding x1 1/30/19.  Pt had imaging done, right kidney mass incidentally discovered. Per pt, rectal bleeding was with having bowel movement and likely due to hemorrhoids.  Pt now scheduled for Right Laparoscopic partial Nephrectomy 2/28/19.

## 2019-02-14 NOTE — H&P PST ADULT - PROBLEM SELECTOR PLAN 1
Right Laparoscopic partial Nephrectomy 2/28/19.     CBC CMP Hgba1C T&S UA CS    MONIQUE precaution, OR booking informed  Pre-op instructions and antibacterial soap given and explained

## 2019-02-14 NOTE — H&P PST ADULT - FAMILY HISTORY
Father  Still living? No  Family history of hypertension, Age at diagnosis: Age Unknown  Family history of prostate cancer in father, Age at diagnosis: Age Unknown

## 2019-02-16 LAB
BACTERIA UR CULT: SIGNIFICANT CHANGE UP
SPECIMEN SOURCE: SIGNIFICANT CHANGE UP

## 2019-02-27 ENCOUNTER — TRANSCRIPTION ENCOUNTER (OUTPATIENT)
Age: 79
End: 2019-02-27

## 2019-02-27 NOTE — ASU PATIENT PROFILE, ADULT - PMH
AF (atrial fibrillation)    BPH (benign prostatic hyperplasia)    DM (diabetes mellitus)    GERD (gastroesophageal reflux disease)    GI bleed    Gout    HLD (hyperlipidemia)    HTN (hypertension)    Obesity

## 2019-02-27 NOTE — ASU PATIENT PROFILE, ADULT - PSH
S/P cataract surgery    S/P hernia repair  umbilical hernia repair 2011  S/P knee replacement, bilateral

## 2019-02-28 ENCOUNTER — APPOINTMENT (OUTPATIENT)
Dept: UROLOGY | Facility: HOSPITAL | Age: 79
End: 2019-02-28

## 2019-02-28 ENCOUNTER — RESULT REVIEW (OUTPATIENT)
Age: 79
End: 2019-02-28

## 2019-02-28 ENCOUNTER — INPATIENT (INPATIENT)
Facility: HOSPITAL | Age: 79
LOS: 2 days | Discharge: HOME | End: 2019-03-03
Attending: SPECIALIST | Admitting: SPECIALIST
Payer: MEDICARE

## 2019-02-28 VITALS
OXYGEN SATURATION: 97 % | WEIGHT: 203.93 LBS | SYSTOLIC BLOOD PRESSURE: 149 MMHG | DIASTOLIC BLOOD PRESSURE: 98 MMHG | TEMPERATURE: 98 F | HEART RATE: 86 BPM | HEIGHT: 65 IN | RESPIRATION RATE: 16 BRPM

## 2019-02-28 DIAGNOSIS — Z98.49 CATARACT EXTRACTION STATUS, UNSPECIFIED EYE: Chronic | ICD-10-CM

## 2019-02-28 DIAGNOSIS — Z98.89 OTHER SPECIFIED POSTPROCEDURAL STATES: Chronic | ICD-10-CM

## 2019-02-28 DIAGNOSIS — Z96.653 PRESENCE OF ARTIFICIAL KNEE JOINT, BILATERAL: Chronic | ICD-10-CM

## 2019-02-28 DIAGNOSIS — N28.89 OTHER SPECIFIED DISORDERS OF KIDNEY AND URETER: ICD-10-CM

## 2019-02-28 DIAGNOSIS — I48.2 CHRONIC ATRIAL FIBRILLATION: ICD-10-CM

## 2019-02-28 LAB
ANION GAP SERPL CALC-SCNC: 15 MMO/L — HIGH (ref 7–14)
BUN SERPL-MCNC: 10 MG/DL — SIGNIFICANT CHANGE UP (ref 7–23)
CALCIUM SERPL-MCNC: 7.5 MG/DL — LOW (ref 8.4–10.5)
CHLORIDE SERPL-SCNC: 106 MMOL/L — SIGNIFICANT CHANGE UP (ref 98–107)
CO2 SERPL-SCNC: 19 MMOL/L — LOW (ref 22–31)
CREAT SERPL-MCNC: 0.78 MG/DL — SIGNIFICANT CHANGE UP (ref 0.5–1.3)
GLUCOSE BLDC GLUCOMTR-MCNC: 100 MG/DL — HIGH (ref 70–99)
GLUCOSE SERPL-MCNC: 148 MG/DL — HIGH (ref 70–99)
HCT VFR BLD CALC: 45.2 % — SIGNIFICANT CHANGE UP (ref 39–50)
HGB BLD-MCNC: 14.7 G/DL — SIGNIFICANT CHANGE UP (ref 13–17)
MCHC RBC-ENTMCNC: 30.6 PG — SIGNIFICANT CHANGE UP (ref 27–34)
MCHC RBC-ENTMCNC: 32.5 % — SIGNIFICANT CHANGE UP (ref 32–36)
MCV RBC AUTO: 94 FL — SIGNIFICANT CHANGE UP (ref 80–100)
NRBC # FLD: 0 K/UL — LOW (ref 25–125)
PLATELET # BLD AUTO: 158 K/UL — SIGNIFICANT CHANGE UP (ref 150–400)
PMV BLD: 9.3 FL — SIGNIFICANT CHANGE UP (ref 7–13)
POTASSIUM SERPL-MCNC: 3.5 MMOL/L — SIGNIFICANT CHANGE UP (ref 3.5–5.3)
POTASSIUM SERPL-SCNC: 3.5 MMOL/L — SIGNIFICANT CHANGE UP (ref 3.5–5.3)
RBC # BLD: 4.81 M/UL — SIGNIFICANT CHANGE UP (ref 4.2–5.8)
RBC # FLD: 13.1 % — SIGNIFICANT CHANGE UP (ref 10.3–14.5)
RH IG SCN BLD-IMP: POSITIVE — SIGNIFICANT CHANGE UP
SODIUM SERPL-SCNC: 140 MMOL/L — SIGNIFICANT CHANGE UP (ref 135–145)
WBC # BLD: 13.64 K/UL — HIGH (ref 3.8–10.5)
WBC # FLD AUTO: 13.64 K/UL — HIGH (ref 3.8–10.5)

## 2019-02-28 PROCEDURE — 50545 LAPARO RADICAL NEPHRECTOMY: CPT | Mod: RT

## 2019-02-28 PROCEDURE — 88312 SPECIAL STAINS GROUP 1: CPT | Mod: 26

## 2019-02-28 PROCEDURE — 88307 TISSUE EXAM BY PATHOLOGIST: CPT | Mod: 26

## 2019-02-28 RX ORDER — ONDANSETRON 8 MG/1
4 TABLET, FILM COATED ORAL ONCE
Qty: 0 | Refills: 0 | Status: DISCONTINUED | OUTPATIENT
Start: 2019-02-28 | End: 2019-02-28

## 2019-02-28 RX ORDER — DEXTROSE 50 % IN WATER 50 %
15 SYRINGE (ML) INTRAVENOUS ONCE
Qty: 0 | Refills: 0 | Status: DISCONTINUED | OUTPATIENT
Start: 2019-02-28 | End: 2019-03-03

## 2019-02-28 RX ORDER — HEPARIN SODIUM 5000 [USP'U]/ML
5000 INJECTION INTRAVENOUS; SUBCUTANEOUS EVERY 8 HOURS
Qty: 0 | Refills: 0 | Status: DISCONTINUED | OUTPATIENT
Start: 2019-02-28 | End: 2019-03-03

## 2019-02-28 RX ORDER — DOCUSATE SODIUM 100 MG
100 CAPSULE ORAL THREE TIMES A DAY
Qty: 0 | Refills: 0 | Status: DISCONTINUED | OUTPATIENT
Start: 2019-02-28 | End: 2019-03-03

## 2019-02-28 RX ORDER — SODIUM CHLORIDE 9 MG/ML
1000 INJECTION, SOLUTION INTRAVENOUS
Qty: 0 | Refills: 0 | Status: DISCONTINUED | OUTPATIENT
Start: 2019-02-28 | End: 2019-03-03

## 2019-02-28 RX ORDER — GLUCAGON INJECTION, SOLUTION 0.5 MG/.1ML
1 INJECTION, SOLUTION SUBCUTANEOUS ONCE
Qty: 0 | Refills: 0 | Status: DISCONTINUED | OUTPATIENT
Start: 2019-02-28 | End: 2019-03-03

## 2019-02-28 RX ORDER — DEXTROSE 50 % IN WATER 50 %
25 SYRINGE (ML) INTRAVENOUS ONCE
Qty: 0 | Refills: 0 | Status: DISCONTINUED | OUTPATIENT
Start: 2019-02-28 | End: 2019-03-03

## 2019-02-28 RX ORDER — ATORVASTATIN CALCIUM 80 MG/1
10 TABLET, FILM COATED ORAL AT BEDTIME
Qty: 0 | Refills: 0 | Status: DISCONTINUED | OUTPATIENT
Start: 2019-02-28 | End: 2019-03-03

## 2019-02-28 RX ORDER — ACETAMINOPHEN 500 MG
975 TABLET ORAL EVERY 6 HOURS
Qty: 0 | Refills: 0 | Status: DISCONTINUED | OUTPATIENT
Start: 2019-02-28 | End: 2019-03-03

## 2019-02-28 RX ORDER — HYDROMORPHONE HYDROCHLORIDE 2 MG/ML
0.5 INJECTION INTRAMUSCULAR; INTRAVENOUS; SUBCUTANEOUS
Qty: 0 | Refills: 0 | Status: DISCONTINUED | OUTPATIENT
Start: 2019-02-28 | End: 2019-02-28

## 2019-02-28 RX ORDER — OXYCODONE HYDROCHLORIDE 5 MG/1
5 TABLET ORAL EVERY 4 HOURS
Qty: 0 | Refills: 0 | Status: DISCONTINUED | OUTPATIENT
Start: 2019-02-28 | End: 2019-03-03

## 2019-02-28 RX ORDER — INSULIN LISPRO 100/ML
VIAL (ML) SUBCUTANEOUS AT BEDTIME
Qty: 0 | Refills: 0 | Status: DISCONTINUED | OUTPATIENT
Start: 2019-02-28 | End: 2019-03-03

## 2019-02-28 RX ORDER — MONTELUKAST 4 MG/1
10 TABLET, CHEWABLE ORAL DAILY
Qty: 0 | Refills: 0 | Status: DISCONTINUED | OUTPATIENT
Start: 2019-02-28 | End: 2019-03-03

## 2019-02-28 RX ORDER — ALLOPURINOL 300 MG
300 TABLET ORAL DAILY
Qty: 0 | Refills: 0 | Status: DISCONTINUED | OUTPATIENT
Start: 2019-02-28 | End: 2019-03-03

## 2019-02-28 RX ORDER — ASPIRIN/CALCIUM CARB/MAGNESIUM 324 MG
81 TABLET ORAL DAILY
Qty: 0 | Refills: 0 | Status: DISCONTINUED | OUTPATIENT
Start: 2019-02-28 | End: 2019-03-03

## 2019-02-28 RX ORDER — OXYCODONE HYDROCHLORIDE 5 MG/1
10 TABLET ORAL EVERY 4 HOURS
Qty: 0 | Refills: 0 | Status: DISCONTINUED | OUTPATIENT
Start: 2019-02-28 | End: 2019-03-03

## 2019-02-28 RX ORDER — LOSARTAN POTASSIUM 100 MG/1
50 TABLET, FILM COATED ORAL
Qty: 0 | Refills: 0 | Status: DISCONTINUED | OUTPATIENT
Start: 2019-02-28 | End: 2019-03-01

## 2019-02-28 RX ORDER — SODIUM CHLORIDE 9 MG/ML
1000 INJECTION, SOLUTION INTRAVENOUS
Qty: 0 | Refills: 0 | Status: DISCONTINUED | OUTPATIENT
Start: 2019-02-28 | End: 2019-02-28

## 2019-02-28 RX ORDER — CARVEDILOL PHOSPHATE 80 MG/1
25 CAPSULE, EXTENDED RELEASE ORAL EVERY 12 HOURS
Qty: 0 | Refills: 0 | Status: DISCONTINUED | OUTPATIENT
Start: 2019-02-28 | End: 2019-03-01

## 2019-02-28 RX ORDER — TAMSULOSIN HYDROCHLORIDE 0.4 MG/1
0.4 CAPSULE ORAL AT BEDTIME
Qty: 0 | Refills: 0 | Status: DISCONTINUED | OUTPATIENT
Start: 2019-02-28 | End: 2019-03-03

## 2019-02-28 RX ORDER — FENTANYL CITRATE 50 UG/ML
25 INJECTION INTRAVENOUS
Qty: 0 | Refills: 0 | Status: DISCONTINUED | OUTPATIENT
Start: 2019-02-28 | End: 2019-02-28

## 2019-02-28 RX ORDER — HYDROMORPHONE HYDROCHLORIDE 2 MG/ML
1 INJECTION INTRAMUSCULAR; INTRAVENOUS; SUBCUTANEOUS ONCE
Qty: 0 | Refills: 0 | Status: DISCONTINUED | OUTPATIENT
Start: 2019-02-28 | End: 2019-02-28

## 2019-02-28 RX ORDER — INSULIN LISPRO 100/ML
VIAL (ML) SUBCUTANEOUS
Qty: 0 | Refills: 0 | Status: DISCONTINUED | OUTPATIENT
Start: 2019-02-28 | End: 2019-03-03

## 2019-02-28 RX ORDER — SENNA PLUS 8.6 MG/1
2 TABLET ORAL AT BEDTIME
Qty: 0 | Refills: 0 | Status: DISCONTINUED | OUTPATIENT
Start: 2019-02-28 | End: 2019-03-03

## 2019-02-28 RX ORDER — DIGOXIN 250 MCG
0.25 TABLET ORAL DAILY
Qty: 0 | Refills: 0 | Status: DISCONTINUED | OUTPATIENT
Start: 2019-02-28 | End: 2019-03-03

## 2019-02-28 RX ORDER — SODIUM CHLORIDE 9 MG/ML
1000 INJECTION, SOLUTION INTRAVENOUS
Qty: 0 | Refills: 0 | Status: DISCONTINUED | OUTPATIENT
Start: 2019-02-28 | End: 2019-03-01

## 2019-02-28 RX ORDER — DEXTROSE 50 % IN WATER 50 %
12.5 SYRINGE (ML) INTRAVENOUS ONCE
Qty: 0 | Refills: 0 | Status: DISCONTINUED | OUTPATIENT
Start: 2019-02-28 | End: 2019-03-03

## 2019-02-28 RX ADMIN — TAMSULOSIN HYDROCHLORIDE 0.4 MILLIGRAM(S): 0.4 CAPSULE ORAL at 23:47

## 2019-02-28 RX ADMIN — Medication 100 MILLIGRAM(S): at 23:47

## 2019-02-28 RX ADMIN — SENNA PLUS 2 TABLET(S): 8.6 TABLET ORAL at 23:47

## 2019-02-28 RX ADMIN — Medication 81 MILLIGRAM(S): at 23:48

## 2019-02-28 RX ADMIN — CARVEDILOL PHOSPHATE 25 MILLIGRAM(S): 80 CAPSULE, EXTENDED RELEASE ORAL at 23:46

## 2019-02-28 RX ADMIN — Medication 975 MILLIGRAM(S): at 22:54

## 2019-02-28 RX ADMIN — LOSARTAN POTASSIUM 50 MILLIGRAM(S): 100 TABLET, FILM COATED ORAL at 23:48

## 2019-02-28 RX ADMIN — HEPARIN SODIUM 5000 UNIT(S): 5000 INJECTION INTRAVENOUS; SUBCUTANEOUS at 23:47

## 2019-02-28 RX ADMIN — ATORVASTATIN CALCIUM 10 MILLIGRAM(S): 80 TABLET, FILM COATED ORAL at 23:46

## 2019-02-28 RX ADMIN — MONTELUKAST 10 MILLIGRAM(S): 4 TABLET, CHEWABLE ORAL at 23:46

## 2019-02-28 NOTE — PROGRESS NOTE ADULT - PROBLEM SELECTOR PLAN 2
To clarify, patient takes coumadin for chronic Afib, and this has been held since Sunday 2/24.  Cardiology to see in am.

## 2019-02-28 NOTE — BRIEF OPERATIVE NOTE - PROCEDURE
<<-----Click on this checkbox to enter Procedure Laparoscopic right radical nephrectomy  02/28/2019    Active  KATHLEEN

## 2019-02-28 NOTE — BRIEF OPERATIVE NOTE - PRE-OP DX
Atrial fibrillation  02/14/2019    Active  Jackie Rios  Kidney mass  02/14/2019    Active  Jackie Rios

## 2019-02-28 NOTE — PROGRESS NOTE ADULT - PROBLEM SELECTOR PLAN 1
Strict I&O's  Analgesia prn   Antiemetics prn  DVT prophylaxis  Incentive spirometry  Clears   OOB  AM labs Strict I&O's  Analgesia prn   B+O suppository for further bladder spasms  Antiemetics prn  DVT prophylaxis  Incentive spirometry  Clears   OOB  AM labs

## 2019-03-01 LAB
ANION GAP SERPL CALC-SCNC: 12 MMO/L — SIGNIFICANT CHANGE UP (ref 7–14)
ANION GAP SERPL CALC-SCNC: 15 MMO/L — HIGH (ref 7–14)
BUN SERPL-MCNC: 14 MG/DL — SIGNIFICANT CHANGE UP (ref 7–23)
BUN SERPL-MCNC: 16 MG/DL — SIGNIFICANT CHANGE UP (ref 7–23)
CALCIUM SERPL-MCNC: 8.7 MG/DL — SIGNIFICANT CHANGE UP (ref 8.4–10.5)
CALCIUM SERPL-MCNC: 9.3 MG/DL — SIGNIFICANT CHANGE UP (ref 8.4–10.5)
CHLORIDE SERPL-SCNC: 94 MMOL/L — LOW (ref 98–107)
CHLORIDE SERPL-SCNC: 99 MMOL/L — SIGNIFICANT CHANGE UP (ref 98–107)
CO2 SERPL-SCNC: 23 MMOL/L — SIGNIFICANT CHANGE UP (ref 22–31)
CO2 SERPL-SCNC: 23 MMOL/L — SIGNIFICANT CHANGE UP (ref 22–31)
CREAT SERPL-MCNC: 1.14 MG/DL — SIGNIFICANT CHANGE UP (ref 0.5–1.3)
CREAT SERPL-MCNC: 1.28 MG/DL — SIGNIFICANT CHANGE UP (ref 0.5–1.3)
DIGOXIN SERPL-MCNC: 1 NG/ML — SIGNIFICANT CHANGE UP (ref 0.8–2)
GLUCOSE BLDC GLUCOMTR-MCNC: 103 MG/DL — HIGH (ref 70–99)
GLUCOSE BLDC GLUCOMTR-MCNC: 113 MG/DL — HIGH (ref 70–99)
GLUCOSE BLDC GLUCOMTR-MCNC: 123 MG/DL — HIGH (ref 70–99)
GLUCOSE BLDC GLUCOMTR-MCNC: 139 MG/DL — HIGH (ref 70–99)
GLUCOSE SERPL-MCNC: 116 MG/DL — HIGH (ref 70–99)
GLUCOSE SERPL-MCNC: 135 MG/DL — HIGH (ref 70–99)
HCT VFR BLD CALC: 45 % — SIGNIFICANT CHANGE UP (ref 39–50)
HCT VFR BLD CALC: 48.8 % — SIGNIFICANT CHANGE UP (ref 39–50)
HGB BLD-MCNC: 14.9 G/DL — SIGNIFICANT CHANGE UP (ref 13–17)
HGB BLD-MCNC: 15.7 G/DL — SIGNIFICANT CHANGE UP (ref 13–17)
MCHC RBC-ENTMCNC: 30.3 PG — SIGNIFICANT CHANGE UP (ref 27–34)
MCHC RBC-ENTMCNC: 30.9 PG — SIGNIFICANT CHANGE UP (ref 27–34)
MCHC RBC-ENTMCNC: 32.2 % — SIGNIFICANT CHANGE UP (ref 32–36)
MCHC RBC-ENTMCNC: 33.1 % — SIGNIFICANT CHANGE UP (ref 32–36)
MCV RBC AUTO: 93.4 FL — SIGNIFICANT CHANGE UP (ref 80–100)
MCV RBC AUTO: 94 FL — SIGNIFICANT CHANGE UP (ref 80–100)
NRBC # FLD: 0 K/UL — LOW (ref 25–125)
NRBC # FLD: 0 K/UL — LOW (ref 25–125)
PLATELET # BLD AUTO: 153 K/UL — SIGNIFICANT CHANGE UP (ref 150–400)
PLATELET # BLD AUTO: 157 K/UL — SIGNIFICANT CHANGE UP (ref 150–400)
PMV BLD: 9.2 FL — SIGNIFICANT CHANGE UP (ref 7–13)
PMV BLD: 9.4 FL — SIGNIFICANT CHANGE UP (ref 7–13)
POTASSIUM SERPL-MCNC: 4.3 MMOL/L — SIGNIFICANT CHANGE UP (ref 3.5–5.3)
POTASSIUM SERPL-MCNC: 4.5 MMOL/L — SIGNIFICANT CHANGE UP (ref 3.5–5.3)
POTASSIUM SERPL-SCNC: 4.3 MMOL/L — SIGNIFICANT CHANGE UP (ref 3.5–5.3)
POTASSIUM SERPL-SCNC: 4.5 MMOL/L — SIGNIFICANT CHANGE UP (ref 3.5–5.3)
RBC # BLD: 4.82 M/UL — SIGNIFICANT CHANGE UP (ref 4.2–5.8)
RBC # BLD: 5.19 M/UL — SIGNIFICANT CHANGE UP (ref 4.2–5.8)
RBC # FLD: 13 % — SIGNIFICANT CHANGE UP (ref 10.3–14.5)
RBC # FLD: 13.1 % — SIGNIFICANT CHANGE UP (ref 10.3–14.5)
SODIUM SERPL-SCNC: 129 MMOL/L — LOW (ref 135–145)
SODIUM SERPL-SCNC: 137 MMOL/L — SIGNIFICANT CHANGE UP (ref 135–145)
WBC # BLD: 12.21 K/UL — HIGH (ref 3.8–10.5)
WBC # BLD: 13.19 K/UL — HIGH (ref 3.8–10.5)
WBC # FLD AUTO: 12.21 K/UL — HIGH (ref 3.8–10.5)
WBC # FLD AUTO: 13.19 K/UL — HIGH (ref 3.8–10.5)

## 2019-03-01 RX ORDER — CARVEDILOL PHOSPHATE 80 MG/1
6.25 CAPSULE, EXTENDED RELEASE ORAL EVERY 12 HOURS
Qty: 0 | Refills: 0 | Status: DISCONTINUED | OUTPATIENT
Start: 2019-03-01 | End: 2019-03-03

## 2019-03-01 RX ORDER — SODIUM CHLORIDE 9 MG/ML
1000 INJECTION, SOLUTION INTRAVENOUS
Qty: 0 | Refills: 0 | Status: DISCONTINUED | OUTPATIENT
Start: 2019-03-01 | End: 2019-03-01

## 2019-03-01 RX ORDER — SODIUM CHLORIDE 9 MG/ML
1000 INJECTION INTRAMUSCULAR; INTRAVENOUS; SUBCUTANEOUS
Qty: 0 | Refills: 0 | Status: DISCONTINUED | OUTPATIENT
Start: 2019-03-01 | End: 2019-03-02

## 2019-03-01 RX ORDER — SODIUM CHLORIDE 9 MG/ML
1000 INJECTION, SOLUTION INTRAVENOUS ONCE
Qty: 0 | Refills: 0 | Status: COMPLETED | OUTPATIENT
Start: 2019-03-01 | End: 2019-03-01

## 2019-03-01 RX ORDER — LIDOCAINE HCL 20 MG/ML
20 VIAL (ML) INJECTION ONCE
Qty: 0 | Refills: 0 | Status: DISCONTINUED | OUTPATIENT
Start: 2019-03-01 | End: 2019-03-03

## 2019-03-01 RX ORDER — ONDANSETRON 8 MG/1
4 TABLET, FILM COATED ORAL ONCE
Qty: 0 | Refills: 0 | Status: COMPLETED | OUTPATIENT
Start: 2019-03-01 | End: 2019-03-01

## 2019-03-01 RX ADMIN — SODIUM CHLORIDE 1000 MILLILITER(S): 9 INJECTION, SOLUTION INTRAVENOUS at 14:40

## 2019-03-01 RX ADMIN — Medication 975 MILLIGRAM(S): at 05:56

## 2019-03-01 RX ADMIN — SODIUM CHLORIDE 125 MILLILITER(S): 9 INJECTION, SOLUTION INTRAVENOUS at 06:08

## 2019-03-01 RX ADMIN — Medication 100 MILLIGRAM(S): at 22:16

## 2019-03-01 RX ADMIN — SODIUM CHLORIDE 125 MILLILITER(S): 9 INJECTION INTRAMUSCULAR; INTRAVENOUS; SUBCUTANEOUS at 22:13

## 2019-03-01 RX ADMIN — TAMSULOSIN HYDROCHLORIDE 0.4 MILLIGRAM(S): 0.4 CAPSULE ORAL at 22:16

## 2019-03-01 RX ADMIN — HEPARIN SODIUM 5000 UNIT(S): 5000 INJECTION INTRAVENOUS; SUBCUTANEOUS at 22:17

## 2019-03-01 RX ADMIN — MONTELUKAST 10 MILLIGRAM(S): 4 TABLET, CHEWABLE ORAL at 12:09

## 2019-03-01 RX ADMIN — ATORVASTATIN CALCIUM 10 MILLIGRAM(S): 80 TABLET, FILM COATED ORAL at 22:17

## 2019-03-01 RX ADMIN — CARVEDILOL PHOSPHATE 6.25 MILLIGRAM(S): 80 CAPSULE, EXTENDED RELEASE ORAL at 22:18

## 2019-03-01 RX ADMIN — OXYCODONE HYDROCHLORIDE 5 MILLIGRAM(S): 5 TABLET ORAL at 07:54

## 2019-03-01 RX ADMIN — Medication 10 MILLIGRAM(S): at 22:21

## 2019-03-01 RX ADMIN — HEPARIN SODIUM 5000 UNIT(S): 5000 INJECTION INTRAVENOUS; SUBCUTANEOUS at 05:57

## 2019-03-01 RX ADMIN — Medication 100 MILLIGRAM(S): at 05:57

## 2019-03-01 RX ADMIN — Medication 975 MILLIGRAM(S): at 12:09

## 2019-03-01 RX ADMIN — ONDANSETRON 4 MILLIGRAM(S): 8 TABLET, FILM COATED ORAL at 17:35

## 2019-03-01 RX ADMIN — Medication 300 MILLIGRAM(S): at 12:08

## 2019-03-01 RX ADMIN — Medication 81 MILLIGRAM(S): at 12:08

## 2019-03-01 RX ADMIN — HEPARIN SODIUM 5000 UNIT(S): 5000 INJECTION INTRAVENOUS; SUBCUTANEOUS at 13:16

## 2019-03-01 RX ADMIN — Medication 0.25 MILLIGRAM(S): at 05:56

## 2019-03-01 RX ADMIN — SODIUM CHLORIDE 1000 MILLILITER(S): 9 INJECTION, SOLUTION INTRAVENOUS at 18:06

## 2019-03-01 RX ADMIN — OXYCODONE HYDROCHLORIDE 5 MILLIGRAM(S): 5 TABLET ORAL at 08:30

## 2019-03-01 RX ADMIN — Medication 100 MILLIGRAM(S): at 13:16

## 2019-03-01 NOTE — CONSULT NOTE ADULT - SUBJECTIVE AND OBJECTIVE BOX
CHIEF COMPLAINT:Patient is a 78y old  Male who presents with a chief complaint of "right nephrectomy" (14 Feb 2019 10:41)      HISTORY OF PRESENT ILLNESS:    78 male with history as below known to me from office s/p   right radical nephrectomy  had elevated HR immediate post op  now much more stable  denies any chest pain, sob, palpitation, dizziness or syncope.     PAST MEDICAL & SURGICAL HISTORY:  Obesity  AF (atrial fibrillation)  DM (diabetes mellitus)  HLD (hyperlipidemia)  GI bleed  GERD (gastroesophageal reflux disease)  Gout  BPH (benign prostatic hyperplasia)  HTN (hypertension)  S/P hernia repair: umbilical hernia repair 2011  S/P cataract surgery  S/P knee replacement, bilateral          MEDICATIONS:  aspirin enteric coated 81 milliGRAM(s) Oral daily  carvedilol 25 milliGRAM(s) Oral every 12 hours  digoxin     Tablet 0.25 milliGRAM(s) Oral daily  heparin  Injectable 5000 Unit(s) SubCutaneous every 8 hours  losartan 50 milliGRAM(s) Oral two times a day  tamsulosin 0.4 milliGRAM(s) Oral at bedtime      montelukast 10 milliGRAM(s) Oral daily    acetaminophen   Tablet .. 975 milliGRAM(s) Oral every 6 hours  oxyCODONE    IR 5 milliGRAM(s) Oral every 4 hours PRN  oxyCODONE    IR 10 milliGRAM(s) Oral every 4 hours PRN    docusate sodium 100 milliGRAM(s) Oral three times a day  senna 2 Tablet(s) Oral at bedtime PRN    allopurinol 300 milliGRAM(s) Oral daily  atorvastatin 10 milliGRAM(s) Oral at bedtime  dextrose 40% Gel 15 Gram(s) Oral once PRN  dextrose 50% Injectable 12.5 Gram(s) IV Push once  dextrose 50% Injectable 25 Gram(s) IV Push once  dextrose 50% Injectable 25 Gram(s) IV Push once  glucagon  Injectable 1 milliGRAM(s) IntraMuscular once PRN  insulin lispro (HumaLOG) corrective regimen sliding scale   SubCutaneous three times a day before meals  insulin lispro (HumaLOG) corrective regimen sliding scale   SubCutaneous at bedtime    dextrose 5% + sodium chloride 0.45%. 1000 milliLiter(s) IV Continuous <Continuous>  dextrose 5%. 1000 milliLiter(s) IV Continuous <Continuous>      FAMILY HISTORY:  Family history of prostate cancer in father (Father)  Family history of hypertension (Father)      Non-contributory    SOCIAL HISTORY:    No tobacco, drugs or etoh    Allergies    pantoprazole (Rash)  penicillin (Unknown)    Intolerances    	    REVIEW OF SYSTEMS:  as above  The rest of the 14 points ROS reviewed and except above they are unremarkable.        PHYSICAL EXAM:  T(C): 37 (03-01-19 @ 09:41), Max: 37.1 (03-01-19 @ 05:42)  HR: 65 (03-01-19 @ 09:41) (65 - 103)  BP: 108/52 (03-01-19 @ 09:41) (108/52 - 168/104)  RR: 16 (03-01-19 @ 09:41) (12 - 21)  SpO2: 97% (03-01-19 @ 09:41) (94% - 97%)  Wt(kg): --  I&O's Summary    28 Feb 2019 07:01  -  01 Mar 2019 07:00  --------------------------------------------------------  IN: 575 mL / OUT: 1225 mL / NET: -650 mL    01 Mar 2019 07:01  -  01 Mar 2019 12:00  --------------------------------------------------------  IN: 0 mL / OUT: 0 mL / NET: 0 mL        Appearance: Normal	  HEENT:   no gross abnormality   Cardiovascular: Normal S1 S2,    Murmur:   Neck: JVP normal  Respiratory: Lungs clear   Gastrointestinal:  Soft, Non-tender  Skin: normal   Neuro: No gross deficits.   Psychiatry:  Mood & affect flat  Ext: No edema    LABS/DATA:    TELEMETRY: 	    ECG:  	   	  CARDIAC MARKERS:                                      15.7   12.21 )-----------( 153      ( 01 Mar 2019 06:19 )             48.8     03-01    137  |  99  |  14  ----------------------------<  116<H>  4.5   |  23  |  1.14    Ca    9.3      01 Mar 2019 06:19      proBNP:   Lipid Profile:   HgA1c:   TSH:

## 2019-03-01 NOTE — PROGRESS NOTE ADULT - ASSESSMENT
79 yo M POD #1 right radical nephrectomy, afib rate controlled, coumadin on hold    -AM labs reviewed  -Continue CLD, AM dulcolax, monitor GI function  -IVM  -D/C farias, PVR  -F/U Dr. Garces  -Continue ASA, hold coumadin for now  -F/U hospitalist  -DVT prophy, IS  -OOB, ambulate

## 2019-03-01 NOTE — CONSULT NOTE ADULT - ASSESSMENT
Afib  HR stable  check dig level  lower coreg to 6.25 with lower holding parameter   a/c in future once deemed safe as per     HTN  now BP on low side  DC losartan   lower coreg to 6.25    DM  Monitor finger stick. Insulin coverage. Diabetic education and Diabetic diet. Consider nutrition consultation.    DVT proph  on hep sq

## 2019-03-02 LAB
ANION GAP SERPL CALC-SCNC: 12 MMO/L — SIGNIFICANT CHANGE UP (ref 7–14)
ANION GAP SERPL CALC-SCNC: 9 MMO/L — SIGNIFICANT CHANGE UP (ref 7–14)
BUN SERPL-MCNC: 14 MG/DL — SIGNIFICANT CHANGE UP (ref 7–23)
BUN SERPL-MCNC: 15 MG/DL — SIGNIFICANT CHANGE UP (ref 7–23)
CALCIUM SERPL-MCNC: 8.4 MG/DL — SIGNIFICANT CHANGE UP (ref 8.4–10.5)
CALCIUM SERPL-MCNC: 8.5 MG/DL — SIGNIFICANT CHANGE UP (ref 8.4–10.5)
CHLORIDE SERPL-SCNC: 92 MMOL/L — LOW (ref 98–107)
CHLORIDE SERPL-SCNC: 95 MMOL/L — LOW (ref 98–107)
CO2 SERPL-SCNC: 23 MMOL/L — SIGNIFICANT CHANGE UP (ref 22–31)
CO2 SERPL-SCNC: 25 MMOL/L — SIGNIFICANT CHANGE UP (ref 22–31)
CREAT SERPL-MCNC: 1.22 MG/DL — SIGNIFICANT CHANGE UP (ref 0.5–1.3)
CREAT SERPL-MCNC: 1.25 MG/DL — SIGNIFICANT CHANGE UP (ref 0.5–1.3)
GLUCOSE BLDC GLUCOMTR-MCNC: 102 MG/DL — HIGH (ref 70–99)
GLUCOSE BLDC GLUCOMTR-MCNC: 108 MG/DL — HIGH (ref 70–99)
GLUCOSE BLDC GLUCOMTR-MCNC: 114 MG/DL — HIGH (ref 70–99)
GLUCOSE BLDC GLUCOMTR-MCNC: 91 MG/DL — SIGNIFICANT CHANGE UP (ref 70–99)
GLUCOSE SERPL-MCNC: 124 MG/DL — HIGH (ref 70–99)
GLUCOSE SERPL-MCNC: 130 MG/DL — HIGH (ref 70–99)
HCT VFR BLD CALC: 43.7 % — SIGNIFICANT CHANGE UP (ref 39–50)
HGB BLD-MCNC: 14 G/DL — SIGNIFICANT CHANGE UP (ref 13–17)
MAGNESIUM SERPL-MCNC: 1.5 MG/DL — LOW (ref 1.6–2.6)
MAGNESIUM SERPL-MCNC: 1.7 MG/DL — SIGNIFICANT CHANGE UP (ref 1.6–2.6)
MCHC RBC-ENTMCNC: 30.3 PG — SIGNIFICANT CHANGE UP (ref 27–34)
MCHC RBC-ENTMCNC: 32 % — SIGNIFICANT CHANGE UP (ref 32–36)
MCV RBC AUTO: 94.6 FL — SIGNIFICANT CHANGE UP (ref 80–100)
NRBC # FLD: 0 K/UL — LOW (ref 25–125)
PHOSPHATE SERPL-MCNC: 2.5 MG/DL — SIGNIFICANT CHANGE UP (ref 2.5–4.5)
PLATELET # BLD AUTO: 141 K/UL — LOW (ref 150–400)
PMV BLD: 9.5 FL — SIGNIFICANT CHANGE UP (ref 7–13)
POTASSIUM SERPL-MCNC: 3.8 MMOL/L — SIGNIFICANT CHANGE UP (ref 3.5–5.3)
POTASSIUM SERPL-MCNC: 3.9 MMOL/L — SIGNIFICANT CHANGE UP (ref 3.5–5.3)
POTASSIUM SERPL-SCNC: 3.8 MMOL/L — SIGNIFICANT CHANGE UP (ref 3.5–5.3)
POTASSIUM SERPL-SCNC: 3.9 MMOL/L — SIGNIFICANT CHANGE UP (ref 3.5–5.3)
RBC # BLD: 4.62 M/UL — SIGNIFICANT CHANGE UP (ref 4.2–5.8)
RBC # FLD: 12.7 % — SIGNIFICANT CHANGE UP (ref 10.3–14.5)
SODIUM SERPL-SCNC: 127 MMOL/L — LOW (ref 135–145)
SODIUM SERPL-SCNC: 129 MMOL/L — LOW (ref 135–145)
WBC # BLD: 9.79 K/UL — SIGNIFICANT CHANGE UP (ref 3.8–10.5)
WBC # FLD AUTO: 9.79 K/UL — SIGNIFICANT CHANGE UP (ref 3.8–10.5)

## 2019-03-02 RX ORDER — SODIUM CHLORIDE 9 MG/ML
1000 INJECTION INTRAMUSCULAR; INTRAVENOUS; SUBCUTANEOUS
Qty: 0 | Refills: 0 | Status: DISCONTINUED | OUTPATIENT
Start: 2019-03-02 | End: 2019-03-03

## 2019-03-02 RX ORDER — MAGNESIUM SULFATE 500 MG/ML
1 VIAL (ML) INJECTION ONCE
Qty: 0 | Refills: 0 | Status: COMPLETED | OUTPATIENT
Start: 2019-03-02 | End: 2019-03-02

## 2019-03-02 RX ORDER — MAGNESIUM SULFATE 500 MG/ML
2 VIAL (ML) INJECTION ONCE
Qty: 0 | Refills: 0 | Status: DISCONTINUED | OUTPATIENT
Start: 2019-03-02 | End: 2019-03-02

## 2019-03-02 RX ORDER — IPRATROPIUM BROMIDE 0.2 MG/ML
500 SOLUTION, NON-ORAL INHALATION ONCE
Qty: 0 | Refills: 0 | Status: COMPLETED | OUTPATIENT
Start: 2019-03-02 | End: 2019-03-02

## 2019-03-02 RX ADMIN — Medication 100 MILLIGRAM(S): at 05:59

## 2019-03-02 RX ADMIN — SODIUM CHLORIDE 100 MILLILITER(S): 9 INJECTION INTRAMUSCULAR; INTRAVENOUS; SUBCUTANEOUS at 21:55

## 2019-03-02 RX ADMIN — ATORVASTATIN CALCIUM 10 MILLIGRAM(S): 80 TABLET, FILM COATED ORAL at 21:50

## 2019-03-02 RX ADMIN — HEPARIN SODIUM 5000 UNIT(S): 5000 INJECTION INTRAVENOUS; SUBCUTANEOUS at 15:02

## 2019-03-02 RX ADMIN — Medication 81 MILLIGRAM(S): at 12:05

## 2019-03-02 RX ADMIN — Medication 0.25 MILLIGRAM(S): at 05:59

## 2019-03-02 RX ADMIN — Medication 975 MILLIGRAM(S): at 12:05

## 2019-03-02 RX ADMIN — TAMSULOSIN HYDROCHLORIDE 0.4 MILLIGRAM(S): 0.4 CAPSULE ORAL at 21:50

## 2019-03-02 RX ADMIN — CARVEDILOL PHOSPHATE 6.25 MILLIGRAM(S): 80 CAPSULE, EXTENDED RELEASE ORAL at 11:20

## 2019-03-02 RX ADMIN — Medication 100 GRAM(S): at 02:17

## 2019-03-02 RX ADMIN — MONTELUKAST 10 MILLIGRAM(S): 4 TABLET, CHEWABLE ORAL at 15:03

## 2019-03-02 RX ADMIN — Medication 975 MILLIGRAM(S): at 00:06

## 2019-03-02 RX ADMIN — CARVEDILOL PHOSPHATE 6.25 MILLIGRAM(S): 80 CAPSULE, EXTENDED RELEASE ORAL at 21:50

## 2019-03-02 RX ADMIN — Medication 500 MICROGRAM(S): at 02:33

## 2019-03-02 RX ADMIN — HEPARIN SODIUM 5000 UNIT(S): 5000 INJECTION INTRAVENOUS; SUBCUTANEOUS at 05:59

## 2019-03-02 RX ADMIN — Medication 975 MILLIGRAM(S): at 17:29

## 2019-03-02 RX ADMIN — Medication 975 MILLIGRAM(S): at 05:59

## 2019-03-02 RX ADMIN — Medication 100 MILLIGRAM(S): at 21:55

## 2019-03-02 RX ADMIN — Medication 300 MILLIGRAM(S): at 12:05

## 2019-03-02 RX ADMIN — HEPARIN SODIUM 5000 UNIT(S): 5000 INJECTION INTRAVENOUS; SUBCUTANEOUS at 21:51

## 2019-03-02 RX ADMIN — Medication 100 MILLIGRAM(S): at 12:05

## 2019-03-02 NOTE — CHART NOTE - NSCHARTNOTEFT_GEN_A_CORE
01 Mar 2019 23:57    127    |  92     |  15     ----------------------------<  130    3.9     |  23     |  1.22     Ca    8.5        01 Mar 2019 23:57  Phos  2.5       01 Mar 2019 23:57  Mg     1.5       01 Mar 2019 23:57    Worsening hyponatremia while on normal saline @125/hr.    Patient evaluated.  He denies N/V, headaches, dizziness, pain, confusion.    Patient is A+Ox3, steadily ambulating back from bathroom to bed.  Audible wheeze, but denies shortness of breath.  Spo2 97%RA, RR 16.    Lungs with diffuse wheeze throughout.    --Asymptomatic moderate hyponatremia, borderline hypomag, diffuse wheeze in setting of IVF  -Continue fluids at reduced rate of 100cc/h  -Atrovent nebulizer given with improvement in wheeze.  -Urine osmolality, and sodium  -Replete magnesium  -continue to monitor for symptoms  -continue to monitor electrolytes

## 2019-03-02 NOTE — PROGRESS NOTE ADULT - PROBLEM SELECTOR PLAN 1
- labs reviewed, hyponatremia improving, urine osm and urine sodium sent off  - will maintain on 0.9% NSS for now  - passing flatus but belly distended, c/w CLD  - ADAT  - OOB  - Keep Box for strict I/Os -> TOV tm if urine output improves

## 2019-03-02 NOTE — PROGRESS NOTE ADULT - ASSESSMENT
77 yo M POD #2 right radical nephrectomy, had oliguria, now resolved, mild hyponatremia w/o changes to mental status

## 2019-03-02 NOTE — PROGRESS NOTE ADULT - ASSESSMENT
Afib  HR stable  cont current meds  a/c in future once deemed safe as per     HTN  stable    DM  Monitor finger stick. Insulin coverage. Diabetic education and Diabetic diet. Consider nutrition consultation.    DVT proph  on hep sq

## 2019-03-03 ENCOUNTER — TRANSCRIPTION ENCOUNTER (OUTPATIENT)
Age: 79
End: 2019-03-03

## 2019-03-03 VITALS
RESPIRATION RATE: 16 BRPM | SYSTOLIC BLOOD PRESSURE: 120 MMHG | HEART RATE: 90 BPM | TEMPERATURE: 98 F | OXYGEN SATURATION: 98 % | DIASTOLIC BLOOD PRESSURE: 80 MMHG

## 2019-03-03 LAB
ANION GAP SERPL CALC-SCNC: 10 MMO/L — SIGNIFICANT CHANGE UP (ref 7–14)
BUN SERPL-MCNC: 10 MG/DL — SIGNIFICANT CHANGE UP (ref 7–23)
CALCIUM SERPL-MCNC: 8.2 MG/DL — LOW (ref 8.4–10.5)
CHLORIDE SERPL-SCNC: 102 MMOL/L — SIGNIFICANT CHANGE UP (ref 98–107)
CO2 SERPL-SCNC: 22 MMOL/L — SIGNIFICANT CHANGE UP (ref 22–31)
CREAT SERPL-MCNC: 1.08 MG/DL — SIGNIFICANT CHANGE UP (ref 0.5–1.3)
GLUCOSE BLDC GLUCOMTR-MCNC: 136 MG/DL — HIGH (ref 70–99)
GLUCOSE BLDC GLUCOMTR-MCNC: 96 MG/DL — SIGNIFICANT CHANGE UP (ref 70–99)
GLUCOSE SERPL-MCNC: 95 MG/DL — SIGNIFICANT CHANGE UP (ref 70–99)
POTASSIUM SERPL-MCNC: 3.9 MMOL/L — SIGNIFICANT CHANGE UP (ref 3.5–5.3)
POTASSIUM SERPL-SCNC: 3.9 MMOL/L — SIGNIFICANT CHANGE UP (ref 3.5–5.3)
SODIUM SERPL-SCNC: 134 MMOL/L — LOW (ref 135–145)

## 2019-03-03 RX ORDER — DOCUSATE SODIUM 100 MG
1 CAPSULE ORAL
Qty: 0 | Refills: 0 | DISCHARGE
Start: 2019-03-03

## 2019-03-03 RX ORDER — SENNA PLUS 8.6 MG/1
2 TABLET ORAL
Qty: 0 | Refills: 0 | DISCHARGE
Start: 2019-03-03

## 2019-03-03 RX ORDER — ACETAMINOPHEN 500 MG
3 TABLET ORAL
Qty: 30 | Refills: 0
Start: 2019-03-03

## 2019-03-03 RX ORDER — ASPIRIN/CALCIUM CARB/MAGNESIUM 324 MG
1 TABLET ORAL
Qty: 0 | Refills: 0 | DISCHARGE
Start: 2019-03-03

## 2019-03-03 RX ORDER — OXYCODONE HYDROCHLORIDE 5 MG/1
1 TABLET ORAL
Qty: 10 | Refills: 0
Start: 2019-03-03

## 2019-03-03 RX ADMIN — Medication 975 MILLIGRAM(S): at 00:55

## 2019-03-03 RX ADMIN — Medication 975 MILLIGRAM(S): at 06:08

## 2019-03-03 RX ADMIN — Medication 975 MILLIGRAM(S): at 13:33

## 2019-03-03 RX ADMIN — Medication 100 MILLIGRAM(S): at 06:07

## 2019-03-03 RX ADMIN — HEPARIN SODIUM 5000 UNIT(S): 5000 INJECTION INTRAVENOUS; SUBCUTANEOUS at 06:08

## 2019-03-03 RX ADMIN — HEPARIN SODIUM 5000 UNIT(S): 5000 INJECTION INTRAVENOUS; SUBCUTANEOUS at 13:34

## 2019-03-03 RX ADMIN — CARVEDILOL PHOSPHATE 6.25 MILLIGRAM(S): 80 CAPSULE, EXTENDED RELEASE ORAL at 09:58

## 2019-03-03 RX ADMIN — Medication 81 MILLIGRAM(S): at 13:33

## 2019-03-03 RX ADMIN — Medication 100 MILLIGRAM(S): at 13:34

## 2019-03-03 RX ADMIN — Medication 0.25 MILLIGRAM(S): at 06:08

## 2019-03-03 RX ADMIN — SODIUM CHLORIDE 100 MILLILITER(S): 9 INJECTION INTRAMUSCULAR; INTRAVENOUS; SUBCUTANEOUS at 00:55

## 2019-03-03 RX ADMIN — Medication 300 MILLIGRAM(S): at 13:33

## 2019-03-03 NOTE — DISCHARGE NOTE ADULT - PATIENT PORTAL LINK FT
You can access the [facility] Patient Portal, offered by [facility], by registering with the following website: [email or web address]

## 2019-03-03 NOTE — DISCHARGE NOTE ADULT - CARE PROVIDER_API CALL
Juanjose Mason)  Urology  [address], [place] [address]  Phone: [phone]  Fax: [phone]  Follow Up Time:     Juan Antonio Garces)  Cardiovascular Disease; Internal Medicine  [address] [facility] [address]  [place] [address]  Phone: [phone]  Fax: [phone]  Follow Up Time:

## 2019-03-03 NOTE — DISCHARGE NOTE ADULT - PLAN OF CARE
Follow up WOUND CARE: Staples will be removed at follow up office visit.    BATHING: Please do not submerge wound underwater. You may shower and/or sponge bathe.  ACTIVITY: No heavy lifting anything more than 10-15lbs or straining. Otherwise, you may return to your usual level of physical activity. If you are taking narcotic pain medication (such as Percocet/Oxycodone), do NOT drive a car, operate machinery or make important decisions.  DIET: Diabetic diet  NOTIFY YOUR SURGEON IF: You have any bleeding that does not stop, any pus draining from your wound, any fever (over 100.4 F) or chills, persistent nausea/vomiting with inability to tolerate food or liquids, persistent diarrhea, or if your pain is not controlled on your discharge pain medications.  FOLLOW-UP:  1. Please call to make a follow-up appointment within one week of discharge.   2. Please follow up with your primary care physician within one week of discharge. Please restart coumadin in 2 days (on 3/5/19).  Please follow-up with your cardiologist/PCP about further management of your A.Fib and cardiac medical issues.   We have been holding it while inpatient, postoperatively.  You may resume your Aspirin.

## 2019-03-03 NOTE — DISCHARGE NOTE ADULT - HOSPITAL COURSE
77 y/o male with hx of rectal bleeding x1 1/30/19.  Pt had imaging done, right kidney mass incidentally discovered. Per pt, rectal bleeding was with having bowel movement and likely due to hemorrhoids.  Pt now scheduled for Right Laparoscopic partial Nephrectomy 2/28/19.     Patient underwent R. Lap radical nephrectomy on 2/28/19, c/b by hypertension after anesthesia.  His postop course was complicated by initial oliguria on POD#0-1.  His farias was replaced on POD#1 with minimal output.  He received fluid boluses with appropriate response; however, on POD#2 became hyponatremic with some wheezing.  No change in mental status.  His fluids were decreased, changed from d51/2NS to NS and given nebulizer treatment with improvement.  POD#3, patient felt significantly better with equilibration of urine output and hyponatremia.  He was advanced to regular diet, and tolerated well with copious flatus.     At the time of discharge, the patient was hemodynamically stable, was tolerating PO diet, was voiding urine and passing flatus, was ambulating, and was comfortable with adequate pain control. The patient was instructed to follow up with Dr. Mason within 1-2 weeks after discharge from the hospital. The patient/family felt comfortable with discharge. The patient was discharged to home. The patient had no other issues. He was instructed to restart Coumadin in 2-days post operatively and to f/u with Dr. Garces for further management.

## 2019-03-03 NOTE — DISCHARGE NOTE ADULT - NS AS ACTIVITY OBS
while taking pain medications/No Heavy lifting/straining/Showering allowed/Do not make important decisions/Do not drive or operate machinery

## 2019-03-03 NOTE — DISCHARGE NOTE ADULT - CARE PLAN
Principal Discharge DX:	Kidney mass  Goal:	Follow up  Assessment and plan of treatment:	WOUND CARE: Staples will be removed at follow up office visit.    BATHING: Please do not submerge wound underwater. You may shower and/or sponge bathe.  ACTIVITY: No heavy lifting anything more than 10-15lbs or straining. Otherwise, you may return to your usual level of physical activity. If you are taking narcotic pain medication (such as Percocet/Oxycodone), do NOT drive a car, operate machinery or make important decisions.  DIET: Diabetic diet  NOTIFY YOUR SURGEON IF: You have any bleeding that does not stop, any pus draining from your wound, any fever (over 100.4 F) or chills, persistent nausea/vomiting with inability to tolerate food or liquids, persistent diarrhea, or if your pain is not controlled on your discharge pain medications.  FOLLOW-UP:  1. Please call to make a follow-up appointment within one week of discharge.   2. Please follow up with your primary care physician within one week of discharge.  Secondary Diagnosis:	AF (atrial fibrillation)  Goal:	Follow up  Assessment and plan of treatment:	Please restart coumadin in 2 days (on 3/5/19).  Please follow-up with your cardiologist/PCP about further management of your A.Fib and cardiac medical issues.   We have been holding it while inpatient, postoperatively.  You may resume your Aspirin.

## 2019-03-03 NOTE — DISCHARGE NOTE ADULT - MEDICATION SUMMARY - MEDICATIONS TO TAKE
I will START or STAY ON the medications listed below when I get home from the hospital:    aspirin 81 mg oral delayed release tablet  -- 1 tab(s) by mouth once a day  -- Indication: For Atrial fibrillation    losartan 50 mg oral tablet  -- 1 tab(s) by mouth 2 times a day  -- Indication: For HTN    tamsulosin 0.4 mg oral capsule  -- 1 cap(s) by mouth once a day (at bedtime)  -- Indication: For BPH    digoxin 250 mcg (0.25 mg) oral tablet  -- 1 tab(s) by mouth once a day in am  -- Indication: For Atrial fibrillation    metFORMIN 500 mg oral tablet  -- 1 tab(s) by mouth 2 times a day  -- Indication: For Diabetes     allopurinol 300 mg oral tablet  -- 1 tab(s) by mouth once a day in pm   -- Indication: For Gout    lovastatin 20 mg oral tablet  -- 1 tab(s) by mouth once a day in pm  -- Indication: For Hyperlipidemia    carvedilol 25 mg oral tablet  -- 1 tab(s) by mouth every 12 hours  -- Indication: For Hypertension    docusate sodium 100 mg oral capsule  -- 1 cap(s) by mouth 3 times a day  -- Indication: For Constipation, as needed    senna oral tablet  -- 2 tab(s) by mouth once a day (at bedtime), As needed, Constipation  -- Indication: For Constipation, as needed    montelukast 10 mg oral tablet  -- 1 tab(s) by mouth once a day in pm  -- Indication: For Home med    pantoprazole 40 mg oral delayed release tablet  -- 1 tab(s) by mouth once a day (before a meal)  -- Indication: For GERD I will START or STAY ON the medications listed below when I get home from the hospital:    acetaminophen 325 mg oral tablet  -- 3 tab(s) by mouth every 6 hours MDD:12  -- Indication: For As needed for pain    oxyCODONE 5 mg oral tablet  -- 1 tab(s) by mouth every 4 hours, As needed, Moderate Pain (4 - 6) MDD:5  -- Indication: For As needed for pain    aspirin 81 mg oral delayed release tablet  -- 1 tab(s) by mouth once a day  -- Indication: For Atrial fibrillation    losartan 50 mg oral tablet  -- 1 tab(s) by mouth 2 times a day  -- Indication: For HTN    tamsulosin 0.4 mg oral capsule  -- 1 cap(s) by mouth once a day (at bedtime)  -- Indication: For BPH    digoxin 250 mcg (0.25 mg) oral tablet  -- 1 tab(s) by mouth once a day in am  -- Indication: For Atrial fibrillation    metFORMIN 500 mg oral tablet  -- 1 tab(s) by mouth 2 times a day  -- Indication: For Diabetes     allopurinol 300 mg oral tablet  -- 1 tab(s) by mouth once a day in pm   -- Indication: For Gout    lovastatin 20 mg oral tablet  -- 1 tab(s) by mouth once a day in pm  -- Indication: For Hyperlipidemia    carvedilol 25 mg oral tablet  -- 1 tab(s) by mouth every 12 hours  -- Indication: For Hypertension    docusate sodium 100 mg oral capsule  -- 1 cap(s) by mouth 3 times a day  -- Indication: For Constipation, as needed    senna oral tablet  -- 2 tab(s) by mouth once a day (at bedtime), As needed, Constipation  -- Indication: For Constipation, as needed    montelukast 10 mg oral tablet  -- 1 tab(s) by mouth once a day in pm  -- Indication: For Home med    pantoprazole 40 mg oral delayed release tablet  -- 1 tab(s) by mouth once a day (before a meal)  -- Indication: For GERD

## 2019-03-03 NOTE — DISCHARGE NOTE ADULT - INSTRUCTIONS
Diabetic diet Keep steri strips on abdomen clean and dry, they will be removed at the next doctor's appointment. Keep steri strips on abdomen clean and dry, they will be removed at the next doctor's appointment. Notify MD if you have difficulty urinating, have nausea/vomiting, are unable to tolerate food, oral temperature greater than 101 degrees F, or there is redness/drainage from surgical sites.

## 2019-03-03 NOTE — DISCHARGE NOTE ADULT - CONDITIONS AT DISCHARGE
Vital signs are stable. Patient tolerates regular diet, voiding painlessly. Abdominal steri strips are open to air. Discharged home, accompanied by family.

## 2019-03-03 NOTE — PROGRESS NOTE ADULT - SUBJECTIVE AND OBJECTIVE BOX
ANESTHESIA POSTOP CHECK    78y Male POSTOP DAY 1 S/P right laparoscopic radical nephrectomy    Vital Signs Last 24 Hrs  T(C): 37 (01 Mar 2019 09:41), Max: 37.1 (01 Mar 2019 05:42)  T(F): 98.6 (01 Mar 2019 09:41), Max: 98.8 (01 Mar 2019 05:42)  HR: 65 (01 Mar 2019 09:41) (65 - 103)  BP: 108/52 (01 Mar 2019 09:41) (108/52 - 168/104)  BP(mean): 99 (28 Feb 2019 21:00) (94 - 121)  RR: 16 (01 Mar 2019 09:41) (12 - 21)  SpO2: 97% (01 Mar 2019 09:41) (94% - 97%)  I&O's Summary    28 Feb 2019 07:01  -  01 Mar 2019 07:00  --------------------------------------------------------  IN: 575 mL / OUT: 1225 mL / NET: -650 mL        [x ] NO APPARENT ANESTHESIA COMPLICATIONS
Left Femoral 20 Ga. a-line discontinued.  Pressure applied for 10 minutes,  Pt. tolerated the procedure well.  No ecchymosis or hematoma appreciated.  Sterile dressing applied.
Overnight events:  None    Subjective:  Pt states pain controlled w/ rx, tolerating CLD, no N/V, no flatus yet, not OOB yet    Objective:    Vital signs  T(C): , Max: 37.1 (03-01-19 @ 05:42)  HR: 65 (03-01-19 @ 09:41)  BP: 108/52 (03-01-19 @ 09:41)  SpO2: 97% (03-01-19 @ 09:41)  Wt(kg): --    Output   Box: 1225      Gen: NAD  Abd: dressings c/d/i, moderately distended, tympanitic, appropriately tender  : jarrett removed on rounds    Labs                        15.7   12.21 )-----------( 153      ( 01 Mar 2019 06:19 )             48.8     01 Mar 2019 06:19    137    |  99     |  14     ----------------------------<  116    4.5     |  23     |  1.14     Ca    9.3        01 Mar 2019 06:19
Subjective    No issues overnight.  He is passing flatus, ambulating well.   UOP picked up, likely equilibrating now, farias removed at bedside.       Objective  Vital Signs Last 24 Hrs  T(C): 36.8 (03 Mar 2019 08:48), Max: 36.8 (03 Mar 2019 01:33)  T(F): 98.2 (03 Mar 2019 08:48), Max: 98.2 (03 Mar 2019 01:33)  HR: 88 (03 Mar 2019 08:48) (81 - 104)  BP: 141/65 (03 Mar 2019 08:48) (121/82 - 148/99)  BP(mean): --  RR: 18 (03 Mar 2019 08:48) (16 - 18)  SpO2: 98% (03 Mar 2019 08:48) (95% - 99%)    03-02-19 @ 07:01  -  03-03-19 @ 07:00  --------------------------------------------------------  IN: 900 mL / OUT: 5650 mL / NET: -4750 mL    03-03-19 @ 07:01  -  03-03-19 @ 09:36  --------------------------------------------------------  IN: 0 mL / OUT: 150 mL / NET: -150 mL          Gen NAD  Abd S/mildly distended/appropriately tender   Farias w/ clear yellow output, removed at bedside    Labs    CBC (03-02 @ 06:24)                              14.0                           9.79    )----------------(  141<L>     --    % Neutrophils, --    % Lymphocytes, ANC: --                                  43.7                  BMP (03-03 @ 05:48)             134<L>  |  102     |  10    		Ca++ --      Ca 8.2<L>             ---------------------------------( 95    		Mg --                 3.9     |  22      |  1.08  			Ph --      [facility] (03-02 @ 06:24)             129<L>  |  95<L>   |  14    		Ca++ --      Ca 8.4                ---------------------------------( 124<H>		Mg 1.7                3.8     |  25      |  1.25  			Ph --
Subjective    Overnight events noted. Pt states he feels better, able to get OOB yesterday, passing flatus now, no N/V.    Objective    Vital signs  T(F): , Max: 98.7 (03-02-19 @ 09:01)  HR: 84 (03-02-19 @ 09:01)  BP: 126/90 (03-02-19 @ 09:01)  SpO2: 99% (03-02-19 @ 09:01)  Wt(kg): --    Output     OUT:    Indwelling Catheter - Urethral: 900 mL  Total OUT: 900 mL    Total NET: -900 mL          Gen NAD  Abd S/mild-moderate distension/appropriately tender   Box w/ clear yellow output    Labs      03-02 @ 06:24    WBC 9.79  / Hct 43.7  / SCr 1.25     Na 129 <- 127  Urine sodium: pending  Urine osm: pending
Subjective: Patient seen and examined. No new events except as noted.     SUBJECTIVE/ROS:  No chest pain, dyspnea, palpitation, or dizziness.       MEDICATIONS:  MEDICATIONS  (STANDING):  acetaminophen   Tablet .. 975 milliGRAM(s) Oral every 6 hours  allopurinol 300 milliGRAM(s) Oral daily  aspirin enteric coated 81 milliGRAM(s) Oral daily  atorvastatin 10 milliGRAM(s) Oral at bedtime  carvedilol 6.25 milliGRAM(s) Oral every 12 hours  dextrose 5%. 1000 milliLiter(s) (50 mL/Hr) IV Continuous <Continuous>  dextrose 50% Injectable 12.5 Gram(s) IV Push once  dextrose 50% Injectable 25 Gram(s) IV Push once  dextrose 50% Injectable 25 Gram(s) IV Push once  digoxin     Tablet 0.25 milliGRAM(s) Oral daily  docusate sodium 100 milliGRAM(s) Oral three times a day  heparin  Injectable 5000 Unit(s) SubCutaneous every 8 hours  insulin lispro (HumaLOG) corrective regimen sliding scale   SubCutaneous three times a day before meals  insulin lispro (HumaLOG) corrective regimen sliding scale   SubCutaneous at bedtime  lidocaine 2% Jelly 20 milliLiter(s) IntraUrethral once  montelukast 10 milliGRAM(s) Oral daily  sodium chloride 0.9%. 1000 milliLiter(s) (100 mL/Hr) IV Continuous <Continuous>  tamsulosin 0.4 milliGRAM(s) Oral at bedtime      PHYSICAL EXAM:  T(C): 36.6 (03-02-19 @ 11:09), Max: 37.1 (03-02-19 @ 09:01)  HR: 93 (03-02-19 @ 11:09) (77 - 98)  BP: 121/82 (03-02-19 @ 11:09) (107/63 - 129/84)  RR: 16 (03-02-19 @ 11:09) (16 - 17)  SpO2: 97% (03-02-19 @ 11:09) (96% - 99%)  Wt(kg): --  I&O's Summary    01 Mar 2019 07:01  -  02 Mar 2019 07:00  --------------------------------------------------------  IN: 0 mL / OUT: 900 mL / NET: -900 mL    02 Mar 2019 07:01  -  02 Mar 2019 13:35  --------------------------------------------------------  IN: 0 mL / OUT: 300 mL / NET: -300 mL            JVP: Normal  Neck: supple  Lung: clear   CV: S1 S2 , Murmur:  Abd: soft  Ext: No edema  neuro: Awake  Psych: flat affect  Skin: normal``    LABS/DATA:    CARDIAC MARKERS:                                14.0   9.79  )-----------( 141      ( 02 Mar 2019 06:24 )             43.7     03-02    129<L>  |  95<L>  |  14  ----------------------------<  124<H>  3.8   |  25  |  1.25    Ca    8.4      02 Mar 2019 06:24  Phos  2.5     03-01  Mg     1.7     03-02      proBNP:   Lipid Profile:   HgA1c:   TSH:     TELE:  EKG:
Subjective: Patient seen and examined. No new events except as noted.     SUBJECTIVE/ROS:  Patient seen and examined today. lHe doesn't have any complaints      MEDICATIONS:  MEDICATIONS  (STANDING):  acetaminophen   Tablet .. 975 milliGRAM(s) Oral every 6 hours  allopurinol 300 milliGRAM(s) Oral daily  aspirin enteric coated 81 milliGRAM(s) Oral daily  atorvastatin 10 milliGRAM(s) Oral at bedtime  carvedilol 6.25 milliGRAM(s) Oral every 12 hours  dextrose 5%. 1000 milliLiter(s) (50 mL/Hr) IV Continuous <Continuous>  dextrose 50% Injectable 12.5 Gram(s) IV Push once  dextrose 50% Injectable 25 Gram(s) IV Push once  dextrose 50% Injectable 25 Gram(s) IV Push once  digoxin     Tablet 0.25 milliGRAM(s) Oral daily  docusate sodium 100 milliGRAM(s) Oral three times a day  heparin  Injectable 5000 Unit(s) SubCutaneous every 8 hours  insulin lispro (HumaLOG) corrective regimen sliding scale   SubCutaneous three times a day before meals  insulin lispro (HumaLOG) corrective regimen sliding scale   SubCutaneous at bedtime  lidocaine 2% Jelly 20 milliLiter(s) IntraUrethral once  montelukast 10 milliGRAM(s) Oral daily  sodium chloride 0.9%. 1000 milliLiter(s) (100 mL/Hr) IV Continuous <Continuous>  tamsulosin 0.4 milliGRAM(s) Oral at bedtime      PHYSICAL EXAM:  T(C): 36.9 (03-03-19 @ 13:31), Max: 36.9 (03-03-19 @ 13:31)  HR: 90 (03-03-19 @ 13:31) (88 - 96)  BP: 120/80 (03-03-19 @ 13:31) (120/80 - 147/81)  RR: 16 (03-03-19 @ 13:31) (16 - 18)  SpO2: 98% (03-03-19 @ 13:31) (95% - 99%)  Wt(kg): --  I&O's Summary    02 Mar 2019 07:01  -  03 Mar 2019 07:00  --------------------------------------------------------  IN: 900 mL / OUT: 5650 mL / NET: -4750 mL    03 Mar 2019 07:01  -  03 Mar 2019 18:33  --------------------------------------------------------  IN: 0 mL / OUT: 500 mL / NET: -500 mL            JVP: Normal  Neck: supple  Lung: clear   CV: S1 S2 , Murmur:  Abd: soft  Ext: No edema  neuro: Awake / alert  Psych: flat affect  Skin: normal``    LABS/DATA:    CARDIAC MARKERS:                                14.0   9.79  )-----------( 141      ( 02 Mar 2019 06:24 )             43.7     03-03    134<L>  |  102  |  10  ----------------------------<  95  3.9   |  22  |  1.08    Ca    8.2<L>      03 Mar 2019 05:48  Phos  2.5     03-01  Mg     1.7     03-02      proBNP:   Lipid Profile:   HgA1c:   TSH:     TELE:  EKG:
 Note    Post op Check    s/p: lap right radical nephrectomy      Pt seen and examined with complaints of bladder spasms, and pain. denies nausea.     Vital Signs Last 24 Hrs  T(C): 36.6 (28 Feb 2019 22:16), Max: 36.6 (28 Feb 2019 21:30)  T(F): 97.8 (28 Feb 2019 22:16), Max: 97.9 (28 Feb 2019 21:30)  HR: 97 (28 Feb 2019 22:16) (86 - 103)  BP: 118/82 (28 Feb 2019 22:16) (118/82 - 168/104)  BP(mean): 99 (28 Feb 2019 21:00) (94 - 121)  RR: 16 (28 Feb 2019 22:16) (12 - 21)  SpO2: 97% (28 Feb 2019 22:16) (94% - 97%)    I&O's Summary  Farias: yellow 775    PHYSICAL EXAM:   Constitutional: alert and oriented, no acute distress    Respiratory: clear    Cardiovascular: Irregularly irregular    Gastrointestinal: distended, dressing in place with moderate serosanguinous drainage, tenderness present.     Genitourinary: farias in place, draining well.     Extremities: venodynes in place.     LABS:                        14.7   13.64 )-----------( 158      ( 28 Feb 2019 19:45 )             45.2       02-28    140  |  106  |  10  ----------------------------<  148<H>  3.5   |  19<L>  |  0.78    Ca    7.5<L>      28 Feb 2019 19:45

## 2019-03-03 NOTE — PROGRESS NOTE ADULT - ASSESSMENT
Afib  HR stable  cont current meds  a/c in future once deemed safe as per     HTN  stable    DM  Monitor finger stick. Insulin coverage. Diabetic education and Diabetic diet. Consider nutrition consultation.    DVT proph  on hep sq    Patient to follow up with mean office in a few weeks.

## 2019-03-03 NOTE — PROGRESS NOTE ADULT - PROBLEM SELECTOR PLAN 1
- AM BMP reviewed, Hyponatremia improved  - Check GIF, advance to regular diet  - TOV/PVR, >3L overnight  - OOB/AMbulate  - Possible discharge home if tolerating PO

## 2019-03-03 NOTE — PROGRESS NOTE ADULT - ASSESSMENT
79 yo M POD #2 right radical nephrectomy, had oliguria, now resolved, mild hyponatremia w/o changes to mental status

## 2019-03-05 PROBLEM — E66.9 OBESITY, UNSPECIFIED: Chronic | Status: ACTIVE | Noted: 2019-02-14

## 2019-03-12 LAB — SURGICAL PATHOLOGY STUDY: SIGNIFICANT CHANGE UP

## 2019-03-29 ENCOUNTER — APPOINTMENT (OUTPATIENT)
Dept: UROLOGY | Facility: CLINIC | Age: 79
End: 2019-03-29
Payer: MEDICARE

## 2019-03-29 ENCOUNTER — OTHER (OUTPATIENT)
Age: 79
End: 2019-03-29

## 2019-03-29 VITALS
SYSTOLIC BLOOD PRESSURE: 136 MMHG | WEIGHT: 195.38 LBS | HEART RATE: 85 BPM | DIASTOLIC BLOOD PRESSURE: 94 MMHG | RESPIRATION RATE: 16 BRPM | TEMPERATURE: 97.9 F | HEIGHT: 67 IN | BODY MASS INDEX: 30.66 KG/M2

## 2019-03-29 PROCEDURE — 99024 POSTOP FOLLOW-UP VISIT: CPT

## 2019-03-29 NOTE — HISTORY OF PRESENT ILLNESS
[FreeTextEntry1] : Patient following up for recent incidental finding of right renal mass.Admitted for w/u and treatment upper GI bleed. 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 \par he has had umbilical and inguinal hernia repairs. \par S/p Lap Nephrectomy - did very well post op\par

## 2019-03-29 NOTE — PHYSICAL EXAM
[General Appearance - Well Developed] : well developed [General Appearance - Well Nourished] : well nourished [Abdomen Soft] : soft [Abdomen Tenderness] : non-tender [Abdomen Mass (___ Cm)] : no abdominal mass palpated [Abdomen Hernia] : no hernia was discovered [Edema] : no peripheral edema [] : no respiratory distress [Exaggerated Use Of Accessory Muscles For Inspiration] : no accessory muscle use [Oriented To Time, Place, And Person] : oriented to person, place, and time [No Focal Deficits] : no focal deficits [FreeTextEntry1] : port sites and extraction site looks ok -

## 2019-04-01 LAB
ANION GAP SERPL CALC-SCNC: 12 MMOL/L
BUN SERPL-MCNC: 10 MG/DL
CALCIUM SERPL-MCNC: 9.4 MG/DL
CHLORIDE SERPL-SCNC: 99 MMOL/L
CO2 SERPL-SCNC: 26 MMOL/L
CREAT SERPL-MCNC: 1.12 MG/DL
GLUCOSE SERPL-MCNC: 85 MG/DL
POTASSIUM SERPL-SCNC: 4.5 MMOL/L
SODIUM SERPL-SCNC: 137 MMOL/L

## 2019-10-22 NOTE — PRE-OP CHECKLIST - ALLERGY BAND ON
done/penicillin Melolabial Interpolation Flap Text: A decision was made to reconstruct the defect utilizing an interpolation axial flap and a staged reconstruction.  A telfa template was made of the defect.  This telfa template was then used to outline the melolabial interpolation flap.  The donor area for the pedicle flap was then injected with anesthesia.  The flap was excised through the skin and subcutaneous tissue down to the layer of the underlying musculature.  The pedicle flap was carefully excised within this deep plane to maintain its blood supply.  The edges of the donor site were undermined.   The donor site was closed in a primary fashion.  The pedicle was then rotated into position and sutured.  Once the tube was sutured into place, adequate blood supply was confirmed with blanching and refill.  The pedicle was then wrapped with xeroform gauze and dressed appropriately with a telfa and gauze bandage to ensure continued blood supply and protect the attached pedicle.

## 2019-10-29 ENCOUNTER — APPOINTMENT (OUTPATIENT)
Dept: UROLOGY | Facility: CLINIC | Age: 79
End: 2019-10-29
Payer: MEDICARE

## 2019-10-29 DIAGNOSIS — Q60.0 RENAL AGENESIS, UNILATERAL: ICD-10-CM

## 2019-10-29 PROCEDURE — 99213 OFFICE O/P EST LOW 20 MIN: CPT

## 2019-10-29 NOTE — PHYSICAL EXAM
[General Appearance - Well Developed] : well developed [General Appearance - Well Nourished] : well nourished [Abdomen Soft] : soft [Abdomen Tenderness] : non-tender [Abdomen Mass (___ Cm)] : no abdominal mass palpated [Abdomen Hernia] : no hernia was discovered [Edema] : no peripheral edema [] : no respiratory distress [Exaggerated Use Of Accessory Muscles For Inspiration] : no accessory muscle use [Oriented To Time, Place, And Person] : oriented to person, place, and time [Normal Station and Gait] : the gait and station were normal for the patient's age [No Focal Deficits] : no focal deficits

## 2019-10-29 NOTE — HISTORY OF PRESENT ILLNESS
[FreeTextEntry1] : Patient seen for incidental finding of right renal mass.Admitted for w/u and treatment upper GI bleed. 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 \par he has had umbilical and inguinal hernia repairs. 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 \par

## 2019-12-10 ENCOUNTER — OUTPATIENT (OUTPATIENT)
Dept: OUTPATIENT SERVICES | Facility: HOSPITAL | Age: 79
LOS: 1 days | End: 2019-12-10
Payer: MEDICARE

## 2019-12-10 ENCOUNTER — APPOINTMENT (OUTPATIENT)
Dept: UROLOGY | Facility: CLINIC | Age: 79
End: 2019-12-10
Payer: MEDICARE

## 2019-12-10 DIAGNOSIS — Z98.49 CATARACT EXTRACTION STATUS, UNSPECIFIED EYE: Chronic | ICD-10-CM

## 2019-12-10 DIAGNOSIS — Z96.653 PRESENCE OF ARTIFICIAL KNEE JOINT, BILATERAL: Chronic | ICD-10-CM

## 2019-12-10 DIAGNOSIS — C64.1 MALIGNANT NEOPLASM OF RIGHT KIDNEY, EXCEPT RENAL PELVIS: ICD-10-CM

## 2019-12-10 DIAGNOSIS — Z98.89 OTHER SPECIFIED POSTPROCEDURAL STATES: Chronic | ICD-10-CM

## 2019-12-10 DIAGNOSIS — R35.0 FREQUENCY OF MICTURITION: ICD-10-CM

## 2019-12-10 PROCEDURE — 76775 US EXAM ABDO BACK WALL LIM: CPT

## 2019-12-10 PROCEDURE — 76775 US EXAM ABDO BACK WALL LIM: CPT | Mod: 26

## 2019-12-10 PROCEDURE — 99212 OFFICE O/P EST SF 10 MIN: CPT | Mod: 25

## 2019-12-10 NOTE — PHYSICAL EXAM
[General Appearance - Well Developed] : well developed [Edema] : no peripheral edema [General Appearance - Well Nourished] : well nourished [Exaggerated Use Of Accessory Muscles For Inspiration] : no accessory muscle use [] : no respiratory distress [No Focal Deficits] : no focal deficits [Normal Station and Gait] : the gait and station were normal for the patient's age [Oriented To Time, Place, And Person] : oriented to person, place, and time

## 2019-12-12 NOTE — HISTORY OF PRESENT ILLNESS
[FreeTextEntry1] : Patient seen for incidental finding of right renal mass.Admitted for w/u and treatment upper GI bleed. 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 \par he has had umbilical and inguinal hernia repairs. 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 \par GFR 58 and USG good. \par

## 2019-12-20 DIAGNOSIS — C64.1 MALIGNANT NEOPLASM OF RIGHT KIDNEY, EXCEPT RENAL PELVIS: ICD-10-CM

## 2020-01-22 NOTE — BRIEF OPERATIVE NOTE - ASSISTANT(S)
Pt left vm msg requesting refills.     Last seen 12/11/2019  Next appt 6/11/2020  Oscar Gonzales) [place]

## 2020-04-21 ENCOUNTER — APPOINTMENT (OUTPATIENT)
Dept: CT IMAGING | Facility: IMAGING CENTER | Age: 80
End: 2020-04-21

## 2020-06-09 ENCOUNTER — APPOINTMENT (OUTPATIENT)
Dept: UROLOGY | Facility: CLINIC | Age: 80
End: 2020-06-09

## 2020-08-22 ENCOUNTER — EMERGENCY (EMERGENCY)
Facility: HOSPITAL | Age: 80
LOS: 1 days | Discharge: HOME | End: 2020-08-22
Attending: EMERGENCY MEDICINE
Payer: MEDICARE

## 2020-08-22 VITALS
RESPIRATION RATE: 20 BRPM | DIASTOLIC BLOOD PRESSURE: 128 MMHG | WEIGHT: 214.07 LBS | HEIGHT: 66 IN | OXYGEN SATURATION: 96 % | SYSTOLIC BLOOD PRESSURE: 204 MMHG | HEART RATE: 108 BPM

## 2020-08-22 DIAGNOSIS — Z98.49 CATARACT EXTRACTION STATUS, UNSPECIFIED EYE: Chronic | ICD-10-CM

## 2020-08-22 DIAGNOSIS — Z98.89 OTHER SPECIFIED POSTPROCEDURAL STATES: Chronic | ICD-10-CM

## 2020-08-22 DIAGNOSIS — Z96.653 PRESENCE OF ARTIFICIAL KNEE JOINT, BILATERAL: Chronic | ICD-10-CM

## 2020-08-22 LAB
ALBUMIN SERPL ELPH-MCNC: 4.4 G/DL — SIGNIFICANT CHANGE UP (ref 3.3–5)
ALP SERPL-CCNC: 104 U/L — SIGNIFICANT CHANGE UP (ref 40–120)
ALT FLD-CCNC: 29 U/L — SIGNIFICANT CHANGE UP (ref 10–45)
ANION GAP SERPL CALC-SCNC: 11 MMOL/L — SIGNIFICANT CHANGE UP (ref 5–17)
AST SERPL-CCNC: 45 U/L — HIGH (ref 10–40)
BASOPHILS # BLD AUTO: 0.06 K/UL — SIGNIFICANT CHANGE UP (ref 0–0.2)
BASOPHILS NFR BLD AUTO: 0.8 % — SIGNIFICANT CHANGE UP (ref 0–2)
BILIRUB SERPL-MCNC: 0.6 MG/DL — SIGNIFICANT CHANGE UP (ref 0.2–1.2)
BUN SERPL-MCNC: 17 MG/DL — SIGNIFICANT CHANGE UP (ref 7–23)
CALCIUM SERPL-MCNC: 9.6 MG/DL — SIGNIFICANT CHANGE UP (ref 8.4–10.5)
CHLORIDE SERPL-SCNC: 96 MMOL/L — SIGNIFICANT CHANGE UP (ref 96–108)
CO2 SERPL-SCNC: 25 MMOL/L — SIGNIFICANT CHANGE UP (ref 22–31)
CREAT SERPL-MCNC: 1.28 MG/DL — SIGNIFICANT CHANGE UP (ref 0.5–1.3)
EOSINOPHIL # BLD AUTO: 0.41 K/UL — SIGNIFICANT CHANGE UP (ref 0–0.5)
EOSINOPHIL NFR BLD AUTO: 5.7 % — SIGNIFICANT CHANGE UP (ref 0–6)
GLUCOSE SERPL-MCNC: 103 MG/DL — HIGH (ref 70–99)
HCT VFR BLD CALC: 48.8 % — SIGNIFICANT CHANGE UP (ref 39–50)
HGB BLD-MCNC: 16.6 G/DL — SIGNIFICANT CHANGE UP (ref 13–17)
IMM GRANULOCYTES NFR BLD AUTO: 0.1 % — SIGNIFICANT CHANGE UP (ref 0–1.5)
LYMPHOCYTES # BLD AUTO: 1.91 K/UL — SIGNIFICANT CHANGE UP (ref 1–3.3)
LYMPHOCYTES # BLD AUTO: 26.5 % — SIGNIFICANT CHANGE UP (ref 13–44)
MCHC RBC-ENTMCNC: 32.9 PG — SIGNIFICANT CHANGE UP (ref 27–34)
MCHC RBC-ENTMCNC: 34 GM/DL — SIGNIFICANT CHANGE UP (ref 32–36)
MCV RBC AUTO: 96.8 FL — SIGNIFICANT CHANGE UP (ref 80–100)
MONOCYTES # BLD AUTO: 0.59 K/UL — SIGNIFICANT CHANGE UP (ref 0–0.9)
MONOCYTES NFR BLD AUTO: 8.2 % — SIGNIFICANT CHANGE UP (ref 2–14)
NEUTROPHILS # BLD AUTO: 4.22 K/UL — SIGNIFICANT CHANGE UP (ref 1.8–7.4)
NEUTROPHILS NFR BLD AUTO: 58.7 % — SIGNIFICANT CHANGE UP (ref 43–77)
NRBC # BLD: 0 /100 WBCS — SIGNIFICANT CHANGE UP (ref 0–0)
PLATELET # BLD AUTO: 142 K/UL — LOW (ref 150–400)
POTASSIUM SERPL-MCNC: 5.2 MMOL/L — SIGNIFICANT CHANGE UP (ref 3.5–5.3)
POTASSIUM SERPL-SCNC: 5.2 MMOL/L — SIGNIFICANT CHANGE UP (ref 3.5–5.3)
PROT SERPL-MCNC: 7.4 G/DL — SIGNIFICANT CHANGE UP (ref 6–8.3)
RBC # BLD: 5.04 M/UL — SIGNIFICANT CHANGE UP (ref 4.2–5.8)
RBC # FLD: 13 % — SIGNIFICANT CHANGE UP (ref 10.3–14.5)
SODIUM SERPL-SCNC: 132 MMOL/L — LOW (ref 135–145)
WBC # BLD: 7.2 K/UL — SIGNIFICANT CHANGE UP (ref 3.8–10.5)
WBC # FLD AUTO: 7.2 K/UL — SIGNIFICANT CHANGE UP (ref 3.8–10.5)

## 2020-08-22 PROCEDURE — 93010 ELECTROCARDIOGRAM REPORT: CPT | Mod: 59

## 2020-08-22 PROCEDURE — 99291 CRITICAL CARE FIRST HOUR: CPT | Mod: 25

## 2020-08-22 PROCEDURE — 30903 CONTROL OF NOSEBLEED: CPT

## 2020-08-22 RX ORDER — LABETALOL HCL 100 MG
10 TABLET ORAL ONCE
Refills: 0 | Status: COMPLETED | OUTPATIENT
Start: 2020-08-22 | End: 2020-08-22

## 2020-08-22 RX ORDER — OXYMETAZOLINE HYDROCHLORIDE 0.5 MG/ML
1 SPRAY NASAL ONCE
Refills: 0 | Status: COMPLETED | OUTPATIENT
Start: 2020-08-22 | End: 2020-08-22

## 2020-08-22 RX ADMIN — Medication 10 MILLIGRAM(S): at 23:29

## 2020-08-22 RX ADMIN — OXYMETAZOLINE HYDROCHLORIDE 1 SPRAY(S): 0.5 SPRAY NASAL at 23:29

## 2020-08-22 NOTE — ED PROVIDER NOTE - ENMT, MLM
Airway patent, Nasal mucosa clear, rt sided bleeding, scant amount. Mouth with normal mucosa. Throat has no vesicles, no oropharyngeal exudates and uvula is midline. No stridor.

## 2020-08-22 NOTE — ED PROVIDER NOTE - OBJECTIVE STATEMENT
81 yo M pmhx 79 yo M pmhx afib on warfarin, htn, asthma and allergy , DM presents with nasal bleeding since earlier today. denies rectal bleeding, hematemesis or hematuria. same dosing of warfarin but last INR check not recent. denies infectious symptoms, trauma to nose. denies difficulty swallowing or breathing. no cp, sob, dizziness.

## 2020-08-22 NOTE — ED PROVIDER NOTE - CLINICAL SUMMARY MEDICAL DECISION MAKING FREE TEXT BOX
80yr M hx of nephrectomy, afib on coumadin p/w epistaxis since 4pm today, non traumatic, continued to have bleeding from left nare as well as back of the nose. no lightheadedness, no cp, sob. denies infecitous symptoms prior. takes 4mg of warfarin, last dose this AM. also reports having high BPs, took extra dose of carvedilol before. denies bleeding in urine or stool.  exam notable for no distress, slight bleeding from left nare with blowing nose, no post pharyngeal bleeding, S1-2, clear lungs, soft abd, with surgical scars.  ekg shows afib, rate controlled, given labetalol IV, afrin and rhinorocket deployed in left nare for bleeding control.

## 2020-08-22 NOTE — ED PROVIDER NOTE - PATIENT PORTAL LINK FT
You can access the FollowMyHealth Patient Portal offered by [facility] by registering at the following website: [email or web address]. By joining [employer]’s FollowMyHealth portal, you will also be able to view your health information using other applications (apps) compatible with our system.

## 2020-08-22 NOTE — ED ADULT NURSE NOTE - OBJECTIVE STATEMENT
80yM presents to the ED from home with complaints of nosebleed. PMHx of HTN, asthma, afib (on warfarin), gout and BPH. Pt report he started to have a nosebleed at 4pm which has been continuous since then. Pt report it started spontaneously with no blowing nose or trauma precipitating. Pt reports he has had nosebleeds in the past "a long time ago" and "they had to put something up my nose". Pt currently has oozing blood from L nostril and pt reports increased swallowing of blood. Pt speaking in full complete sentences and denies difficulty breathing. Pt denies dizziness, lightheadedness, change in color, CP, SOB, N/V, abdominal pain, urinary symptoms, headache. Pt AAOx4, VS as documented. Pt hypertensive upon arrival and endorses taking BP medication today as directed. ED MD at bedside and placed rhinorocket in L nostril to control bleeding.

## 2020-08-22 NOTE — ED PROVIDER NOTE - PROGRESS NOTE DETAILS
BPs improved after IV labetalol.bleeding controlled with rhinorocket in place. pt has penicilin allergy. instructed pt to see ENT on Monday and keep the rhinorocket in place and take antibiotic while it is in place. warned against removing it himself, understands the risks. will take regular dose of warfarin as was not supratherapeutic.   CARYN

## 2020-08-22 NOTE — ED PROVIDER NOTE - NSFOLLOWUPINSTRUCTIONS_ED_ALL_ED_FT
You were seen in the Emergency Department (ED) for nose bleed. A rhino rocket was placed.     You are prescribed _. Please take as directed by your pharmacists.   Please take over the counter Tylenol or Ibuprofen as dorected nu the packaging for your pain.     Please follow up with your primary care doctor in the next 72 hours.  If you have issues obtaining follow up, please call: [phone] to obtain a doctor or specialist who takes your insurance in your area.    Please return to the ED if you experience any new or concerning symptoms, such as: worsening pain, chest pain, difficulty breathing, passing out, unable to eat or drink, unable to move or feel part of your body, fever, chills.     Thank you for visiting a [facility] ED. You were seen in the Emergency Department (ED) for nose bleed. A rhino rocket was placed. It needs to be removed on Monday. Someone from the emergency room will reach out to you with an appointment time.     You are prescribed amoxacillin. Please take as directed by your pharmacist.   Please take over the counter Tylenol or Ibuprofen as dorected nu the packaging for your pain.     Please follow up with your primary care doctor in the next 72 hours.  If you have issues obtaining follow up, please call: [phone] to obtain a doctor or specialist who takes your insurance in your area.    Please return to the ED if you experience any new or concerning symptoms, such as: worsening pain, chest pain, difficulty breathing, passing out, unable to eat or drink, unable to move or feel part of your body, fever, chills.     Thank you for visiting a [facility] ED. You were seen in the Emergency Department (ED) for nose bleed. A rhino rocket was placed. It needs to be removed on Monday. Someone from the emergency room will reach out to you with an appointment time.     ** You were given your morning dose of Carvedilol. Please skip your home morning dose today.   ** Take your coumadin normally.   ** You are prescribed amoxacillin. Please take as directed by your pharmacist.     Please take over the counter Tylenol for your pain.     Please follow up with your primary care doctor in the next 72 hours. If you have issues obtaining follow up, please call: [phone] to obtain a doctor or specialist who takes your insurance in your area.    Please return to the ED if you experience any new or concerning symptoms, such as: persistent bleeding, fever, chills.     Thank you for visiting a [facility] ED. You were seen in the Emergency Department (ED) for nose bleed. A rhino rocket was placed. It needs to be removed on Monday by ENT doctor. Do not remove it yourself. Someone from the emergency room will reach out to you with an appointment time.     ** You were given your morning dose of Carvedilol. Please skip your home morning dose today.   ** Take your coumadin normally.   ** You are prescribed clindamycin. Please take as directed by your pharmacist.     Please take over the counter Tylenol for your pain.     Please follow up with your primary care doctor in the next 72 hours. If you have issues obtaining follow up, please call: [phone] to obtain a doctor or specialist who takes your insurance in your area.    Please return to the ED if you experience any new or concerning symptoms, such as: persistent bleeding, persistent headache, fever, chills.     Thank you for visiting a [facility] ED.

## 2020-08-23 VITALS
RESPIRATION RATE: 20 BRPM | SYSTOLIC BLOOD PRESSURE: 144 MMHG | OXYGEN SATURATION: 99 % | DIASTOLIC BLOOD PRESSURE: 112 MMHG | HEART RATE: 89 BPM

## 2020-08-23 LAB
APTT BLD: 52.6 SEC — HIGH (ref 27.5–35.5)
INR BLD: 1.96 RATIO — HIGH (ref 0.88–1.16)
PROTHROM AB SERPL-ACNC: 22.6 SEC — HIGH (ref 10.6–13.6)

## 2020-08-23 PROCEDURE — 85610 PROTHROMBIN TIME: CPT

## 2020-08-23 PROCEDURE — 93005 ELECTROCARDIOGRAM TRACING: CPT | Mod: XU

## 2020-08-23 PROCEDURE — 85730 THROMBOPLASTIN TIME PARTIAL: CPT

## 2020-08-23 PROCEDURE — 85027 COMPLETE CBC AUTOMATED: CPT

## 2020-08-23 PROCEDURE — 80053 COMPREHEN METABOLIC PANEL: CPT

## 2020-08-23 PROCEDURE — 99291 CRITICAL CARE FIRST HOUR: CPT | Mod: 25

## 2020-08-23 PROCEDURE — 30903 CONTROL OF NOSEBLEED: CPT | Mod: LT

## 2020-08-23 PROCEDURE — 96374 THER/PROPH/DIAG INJ IV PUSH: CPT | Mod: XU

## 2020-08-23 RX ORDER — ACETAMINOPHEN 500 MG
975 TABLET ORAL ONCE
Refills: 0 | Status: COMPLETED | OUTPATIENT
Start: 2020-08-23 | End: 2020-08-23

## 2020-08-23 RX ORDER — CARVEDILOL PHOSPHATE 80 MG/1
25 CAPSULE, EXTENDED RELEASE ORAL ONCE
Refills: 0 | Status: COMPLETED | OUTPATIENT
Start: 2020-08-23 | End: 2020-08-23

## 2020-08-23 RX ADMIN — CARVEDILOL PHOSPHATE 25 MILLIGRAM(S): 80 CAPSULE, EXTENDED RELEASE ORAL at 04:37

## 2020-08-23 RX ADMIN — Medication 975 MILLIGRAM(S): at 04:45

## 2020-08-23 NOTE — ED ADULT NURSE REASSESSMENT NOTE - NS ED NURSE REASSESS COMMENT FT1
Pt /112,  per MD Freitas OK to d/c with that pressure. Pt denies cp, sob or ha. Give pt his AM dose of Coreg prior to D/C.

## 2020-08-23 NOTE — ED ADULT NURSE REASSESSMENT NOTE - NS ED NURSE REASSESS COMMENT FT1
Pt AAOx4, VS as documented, NAD. Pt cont on cardiac, BP and spo2 monitor. L nare bleeding controlled with rhinorocket. Pt reports he feels better and ED MD made aware. Pt to be PO trialed per MD order. Pt given cracker and water. Will reassess pt tolerance of PO.

## 2020-08-25 ENCOUNTER — APPOINTMENT (OUTPATIENT)
Dept: OTOLARYNGOLOGY | Facility: CLINIC | Age: 80
End: 2020-08-25
Payer: MEDICARE

## 2020-08-25 VITALS
HEIGHT: 67 IN | HEART RATE: 96 BPM | DIASTOLIC BLOOD PRESSURE: 99 MMHG | BODY MASS INDEX: 33.74 KG/M2 | SYSTOLIC BLOOD PRESSURE: 141 MMHG | WEIGHT: 215 LBS | TEMPERATURE: 97.6 F

## 2020-08-25 DIAGNOSIS — R04.0 EPISTAXIS: ICD-10-CM

## 2020-08-25 PROCEDURE — 99204 OFFICE O/P NEW MOD 45 MIN: CPT

## 2020-08-25 NOTE — HISTORY OF PRESENT ILLNESS
[de-identified] : 79 y/o M on Coumadin.  Had Epistaxis and went to ED on 8/22/20 - They placed a left rapid rhino.  No bleeding since packing placed.  Pos hx of epistaxis with cauterization about 10 y/o.  No bleeding until this episode. \par INR 8/22/20 - 1.96

## 2020-08-25 NOTE — END OF VISIT
[FreeTextEntry3] : I personally saw and examined STEFAN DURAND in detail.  I spoke to LAZARO Sepulveda regarding the assessment and plan of care. I performed the procedures and relevant physical exam.  I have reviewed the above assessment and plan of care and I agree.  I have made changes to the body of the note wherever necessary and appropriate.

## 2020-08-25 NOTE — PHYSICAL EXAM
[Midline] : trachea located in midline position [Normal] : no rashes [de-identified] : Left nare with Rapid Rhino. removed without issue

## 2020-08-25 NOTE — ASSESSMENT
[FreeTextEntry1] : Left epistaxis on Coumadin. Rapid rhino removed slowly using afrin to moisten.  Pt tolerated well.  Observed for 15 additional minutes, no further bleeding. \par - f/up prn

## 2020-08-25 NOTE — CONSULT LETTER
[Dear  ___] : Dear  [unfilled], [Consult Letter:] : I had the pleasure of evaluating your patient, [unfilled]. [Please see my note below.] : Please see my note below. [Consult Closing:] : Thank you very much for allowing me to participate in the care of this patient.  If you have any questions, please do not hesitate to contact me. [Sincerely,] : Sincerely, [FreeTextEntry3] : Rosalba Noble M.D.\par Attending Physician,  \par Department of Otolaryngology - Head and Neck Surgery\par [facility] \par Office: [phone]\par Fax: [phone]\par

## 2020-12-16 NOTE — ED ADULT TRIAGE NOTE - RESPIRATORY RATE (BREATHS/MIN)
Quality 110: Preventive Care And Screening: Influenza Immunization: Influenza Immunization Administered during Influenza season
Detail Level: Detailed
18

## 2021-02-04 NOTE — H&P PST ADULT - HEIGHT IN INCHES
"  FORM C-4:  EMPLOYEE’S CLAIM FOR COMPENSATION/ REPORT OF INITIAL TREATMENT  EMPLOYEE’S CLAIM - PROVIDE ALL INFORMATION REQUESTED   First Name Oliver Last Name Robert Ghotra Birthdate 1996  Sex male Claim Number   Home Address [address]             Zip [address]                                   Age  24 y.o. Height  1.727 m (5' 8\") Weight  71.9 kg (158 lb 8.2 oz) [facility]  [id number]   Mailing Address [address]              Zip [address] Telephone  [phone] (home)  Primary Language Spoken   Insurer  *** Third Party   MISC WORKERS COMP Employee's Occupation (Job Title) When Injury or Occupational Disease Occurred     Employer's Name  Telephone [phone]    Employer Address [address] [facility] [place] [[address]] Zip [address]   Date of Injury  2/2/2021       Hour of Injury  2:00 PM Date Employer Notified  2/4/2021 Last Day of Work after Injury or Occupational Disease  2/4/2021 Supervisor to Whom Injury Reported  Carlos Palumbo   Address or Location of Accident (if applicable) [[address] [address]]   What were you doing at the time of accident? (if applicable) grinding metal    How did this injury or occupational disease occur? Be specific and answer in detail. Use additional sheet if necessary)  I was grinding metal when metal went into both my eyes   If you believe that you have an occupational disease, when did you first have knowledge of the disability and it relationship to your employment? yes i am an empoyee Witnesses to the Accident  Carlos Palumbo   Nature of Injury or Occupational Disease  Workers' Compensation Part(s) of Body Injured or Affected  Eye (L), Eye (R), N/A    I CERTIFY THAT THE ABOVE IS TRUE AND CORRECT TO THE BEST OF MY KNOWLEDGE AND THAT I HAVE PROVIDED THIS INFORMATION IN ORDER TO OBTAIN THE BENEFITS OF [place] IN[place]).  I HEREBY AUTHORIZE ANY PHYSICIAN, CHIROPRACTOR, SURGEON, PRACTITIONER, OR OTHER PERSON, ANY HOSPITAL, INCLUDING [facility] OR [facility]ITAL, ANY MEDICAL SERVICE ORGANIZATION, ANY INSURANCE COMPANY, OR OTHER INSTITUTION OR ORGANIZATION TO RELEASE TO EACH OTHER, ANY MEDICAL OR OTHER INFORMATION, INCLUDING BENEFITS PAID OR PAYABLE, PERTINENT TO THIS INJURY OR DISEASE, EXCEPT INFORMATION RELATIVE TO DIAGNOSIS, TREATMENT AND/OR COUNSELING FOR AIDS, PSYCHOLOGICAL CONDITIONS, ALCOHOL OR CONTROLLED SUBSTANCES, FOR WHICH I MUST GIVE SPECIFIC AUTHORIZATION.  A PHOTOSTAT OF THIS AUTHORIZATION SHALL BE AS VALID AS THE ORIGINAL.  Date                                      Place                                                                             Employee’s Signature   THIS REPORT MUST BE COMPLETED AND MAILED WITHIN 3 WORKING DAYS OF TREATMENT   Place [facility], EMERGENCY DEPT                       Name of Facility [facility]   Date  2/4/2021 Diagnosis  No diagnosis found. Is there evidence the injured employee was under the influence of alcohol and/or another controlled substance at the time of accident?   Hour  8:11 PM Description of Injury or Disease       Treatment     Have you advised the patient to remain off work five days or more?             X-Ray Findings    If Yes   From Date    To Date      From information given by the employee, together with medical evidence, can you directly connect this injury or occupational disease as job incurred?   If No, is employee capable of: Full Duty    Modified Duty      Is additional medical care by a physician indicated?   If Modified Duty, Specify any Limitations / Restrictions       Do you know of any previous injury or disease contributing to this condition or occupational disease?      Date 2/4/2021 Print Doctor’s Name Rito Mcclendon I certify the  "employer’s copy of this form was mailed on:   Address [address] [address] [phone] INSURER’S USE ONLY   Provider’s Tax ID Number [id number] Telephone Dept: [phone]    Doctor’s Signature   Degree        Form C-4 (rev.10/07)                                                                         BRIEF DESCRIPTION OF RIGHTS AND BENEFITS  (Pursuant to [place] 616[place])    Notice of Injury or Occupational Disease (Incident Report Form C-1): If an injury or occupational disease (OD) arises out of and in the course of employment, you must provide written notice to your employer as soon as practicable, but no later than 7 days after the accident or OD. Your employer shall maintain a sufficient supply of the required forms.    Claim for Compensation (Form C-4): If medical treatment is sought, the form C-4 is available at the place of initial treatment. A completed \"Claim for Compensation\" (Form C-4) must be filed within 90 days after an accident or OD. The treating physician or chiropractor must, within 3 working days after treatment, complete and mail to the employer, the employer's insurer and third-party , the Claim for Compensation.    Medical Treatment: If you require medical treatment for your on-the-job injury or OD, you may be required to select a physician or chiropractor from a list provided by your workers’ compensation insurer, if it has contracted with an Organization for Managed Care (MCO) or Preferred Provider Organization (PPO) or providers of health care. If your employer has not entered into a contract with an MCO or PPO, you may select a physician or chiropractor from the Panel of Physicians and Chiropractors. Any medical costs related to your industrial injury or OD will be paid by your insurer.    Temporary Total Disability (TTD): If your doctor has certified that you are unable to work for a period of at least 5 consecutive days, or 5 cumulative days in a 20-day " period, or places restrictions on you that your employer does not accommodate, you may be entitled to TTD compensation.    Temporary Partial Disability (TPD): If the wage you receive upon reemployment is less than the compensation for TTD to which you are entitled, the insurer may be required to pay you TPD compensation to make up the difference. TPD can only be paid for a maximum of 24 months.    Permanent Partial Disability (PPD): When your medical condition is stable and there is an indication of a PPD as a result of your injury or OD, within 30 days, your insurer must arrange for an evaluation by a rating physician or chiropractor to determine the degree of your PPD. The amount of your PPD award depends on the date of injury, the results of the PPD evaluation, your age and wage.    Permanent Total Disability (PTD): If you are medically certified by a treating physician or chiropractor as permanently and totally disabled and have been granted a PTD status by your insurer, you are entitled to receive monthly benefits not to exceed 66 2/3% of your average monthly wage. The amount of your PTD payments is subject to reduction if you previously received a lump-sum PPD award.    Vocational Rehabilitation Services: You may be eligible for vocational rehabilitation services if you are unable to return to the job due to a permanent physical impairment or permanent restrictions as a result of your injury or occupational disease.    Transportation and Per Mary Reimbursement: You may be eligible for travel expenses and per mary associated with medical treatment.    Reopening: You may be able to reopen your claim if your condition worsens after claim closure.     Appeal Process: If you disagree with a written determination issued by the insurer or the insurer does not respond to your request, you may appeal to the Department of Administration, , by following the instructions contained in your determination  letter. You must appeal the determination within 70 days from the date of the determination letter at [address], [address], [place], [place] [address], or [address], [address], [place], [place] [address]. If you disagree with the  decision, you may appeal to the Department of Administration, . You must file your appeal within 30 days from the date of the  decision letter at [address], [address], [place], [place] [address], or [address], [address], [place], [place] [address]. If you disagree with a decision of an , you may file a petition for judicial review with the District Court. You must do so within 30 days of the Appeal Officer’s decision. You may be represented by an  at your own expense or you may contact the [facility] for possible representation.    [place]  for Injured Workers (NAIW): If you disagree with a  decision, you may request that NAIW represent you without charge at an  Hearing. For information regarding denial of benefits, you may contact the [facility] at: [address], [address], [place] [address], [phone], or [address], [place] [address], [phone]    To File a Complaint with the Division: If you wish to file a complaint with the  of the Division of Industrial Relations (DIR),  please contact the Workers’ Compensation Section, [address], [address], [place], [place] [address], telephone [phone], or [address], [address], [place], [place] [address], telephone [phone].    For assistance with Workers’ Compensation Issues: You may contact the [facility] Office for Consumer Health Assistance, [address], [address], [place], [place] [address], Toll Free [phone], Web site: http://[facility].[place].gov/Programs/RAUL E-mail: raul@[facility].[place].gov  D-2 (rev. 10/20)               __________________________________________________________________                                    _________________            Employee Name / Signature                                                                                                                            Date                                            5

## 2021-03-02 NOTE — DISCHARGE NOTE ADULT - FUNCTIONAL SCREEN CURRENT LEVEL: AMBULATION, MLM
Rest, no work, next 2 days  Plenty of fluids  Tylenol or ibuprofen as needed for fever control  Make sure to continue inhaler use  Pulmonary/ lung specialist follow-up is essential   2 = assistive person

## 2022-03-09 ENCOUNTER — APPOINTMENT (OUTPATIENT)
Dept: ULTRASOUND IMAGING | Facility: CLINIC | Age: 82
End: 2022-03-09
Payer: MEDICARE

## 2022-03-09 ENCOUNTER — OUTPATIENT (OUTPATIENT)
Dept: OUTPATIENT SERVICES | Facility: HOSPITAL | Age: 82
LOS: 1 days | End: 2022-03-09
Payer: MEDICARE

## 2022-03-09 ENCOUNTER — APPOINTMENT (OUTPATIENT)
Dept: CT IMAGING | Facility: CLINIC | Age: 82
End: 2022-03-09
Payer: MEDICARE

## 2022-03-09 DIAGNOSIS — Z98.49 CATARACT EXTRACTION STATUS, UNSPECIFIED EYE: Chronic | ICD-10-CM

## 2022-03-09 DIAGNOSIS — Z00.8 ENCOUNTER FOR OTHER GENERAL EXAMINATION: ICD-10-CM

## 2022-03-09 DIAGNOSIS — Z96.653 PRESENCE OF ARTIFICIAL KNEE JOINT, BILATERAL: Chronic | ICD-10-CM

## 2022-03-09 DIAGNOSIS — Z98.89 OTHER SPECIFIED POSTPROCEDURAL STATES: Chronic | ICD-10-CM

## 2022-03-09 PROCEDURE — 70450 CT HEAD/BRAIN W/O DYE: CPT | Mod: 26

## 2022-03-09 PROCEDURE — 93880 EXTRACRANIAL BILAT STUDY: CPT | Mod: 26

## 2022-03-09 PROCEDURE — 93880 EXTRACRANIAL BILAT STUDY: CPT

## 2022-03-09 PROCEDURE — 70450 CT HEAD/BRAIN W/O DYE: CPT

## 2022-03-15 ENCOUNTER — OUTPATIENT (OUTPATIENT)
Dept: OUTPATIENT SERVICES | Facility: HOSPITAL | Age: 82
LOS: 1 days | End: 2022-03-15

## 2022-03-15 ENCOUNTER — APPOINTMENT (OUTPATIENT)
Dept: CV DIAGNOSTICS | Facility: HOSPITAL | Age: 82
End: 2022-03-15
Payer: MEDICARE

## 2022-03-15 DIAGNOSIS — Z98.89 OTHER SPECIFIED POSTPROCEDURAL STATES: Chronic | ICD-10-CM

## 2022-03-15 DIAGNOSIS — Z96.653 PRESENCE OF ARTIFICIAL KNEE JOINT, BILATERAL: Chronic | ICD-10-CM

## 2022-03-15 DIAGNOSIS — R07.9 CHEST PAIN, UNSPECIFIED: ICD-10-CM

## 2022-03-15 DIAGNOSIS — Z98.49 CATARACT EXTRACTION STATUS, UNSPECIFIED EYE: Chronic | ICD-10-CM

## 2022-03-15 DIAGNOSIS — R94.39 ABNORMAL RESULT OF OTHER CARDIOVASCULAR FUNCTION STUDY: ICD-10-CM

## 2022-03-15 DIAGNOSIS — R42 DIZZINESS AND GIDDINESS: ICD-10-CM

## 2022-03-15 PROCEDURE — 93016 CV STRESS TEST SUPVJ ONLY: CPT | Mod: GC

## 2022-03-15 PROCEDURE — 78452 HT MUSCLE IMAGE SPECT MULT: CPT | Mod: 26,MH

## 2022-03-15 PROCEDURE — 93018 CV STRESS TEST I&R ONLY: CPT | Mod: GC

## 2022-05-28 ENCOUNTER — INPATIENT (INPATIENT)
Facility: HOSPITAL | Age: 82
LOS: 4 days | Discharge: HOME | DRG: 202 | End: 2022-06-02
Attending: INTERNAL MEDICINE | Admitting: INTERNAL MEDICINE
Payer: MEDICARE

## 2022-05-28 VITALS
HEIGHT: 67 IN | TEMPERATURE: 98 F | OXYGEN SATURATION: 92 % | DIASTOLIC BLOOD PRESSURE: 103 MMHG | RESPIRATION RATE: 28 BRPM | HEART RATE: 117 BPM | SYSTOLIC BLOOD PRESSURE: 156 MMHG | WEIGHT: 279.99 LBS

## 2022-05-28 DIAGNOSIS — Z96.653 PRESENCE OF ARTIFICIAL KNEE JOINT, BILATERAL: Chronic | ICD-10-CM

## 2022-05-28 DIAGNOSIS — Z98.49 CATARACT EXTRACTION STATUS, UNSPECIFIED EYE: Chronic | ICD-10-CM

## 2022-05-28 DIAGNOSIS — Z98.89 OTHER SPECIFIED POSTPROCEDURAL STATES: Chronic | ICD-10-CM

## 2022-05-28 PROCEDURE — 99292 CRITICAL CARE ADDL 30 MIN: CPT

## 2022-05-28 PROCEDURE — 93010 ELECTROCARDIOGRAM REPORT: CPT

## 2022-05-28 PROCEDURE — 99291 CRITICAL CARE FIRST HOUR: CPT | Mod: 25

## 2022-05-28 RX ORDER — IPRATROPIUM/ALBUTEROL SULFATE 18-103MCG
3 AEROSOL WITH ADAPTER (GRAM) INHALATION ONCE
Refills: 0 | Status: COMPLETED | OUTPATIENT
Start: 2022-05-28 | End: 2022-05-28

## 2022-05-28 RX ORDER — MAGNESIUM SULFATE 500 MG/ML
2 VIAL (ML) INJECTION ONCE
Refills: 0 | Status: COMPLETED | OUTPATIENT
Start: 2022-05-28 | End: 2022-05-28

## 2022-05-28 RX ADMIN — Medication 3 MILLILITER(S): at 23:50

## 2022-05-28 RX ADMIN — Medication 150 GRAM(S): at 23:50

## 2022-05-28 NOTE — ED PROVIDER NOTE - NSICDXFAMILYHX_GEN_ALL_CORE_FT
FAMILY HISTORY:  Father  Still living? No  Family history of hypertension, Age at diagnosis: Age Unknown  Family history of prostate cancer in father, Age at diagnosis: Age Unknown

## 2022-05-28 NOTE — ED PROVIDER NOTE - NSICDXPASTMEDICALHX_GEN_ALL_CORE_FT
PAST MEDICAL HISTORY:  AF (atrial fibrillation)     BPH (benign prostatic hyperplasia)     DM (diabetes mellitus)     GERD (gastroesophageal reflux disease)     GI bleed     Gout     HLD (hyperlipidemia)     HTN (hypertension)     Obesity

## 2022-05-28 NOTE — ED PROVIDER NOTE - PHYSICAL EXAMINATION
GEN - NAD; non-toxic; A+Ox3, speaking full sentences, steady gait   HENT - NC/AT, No visible Ecchymosis, No Abrasions, No Lac/Tears, MMM, no discharge  EYES - EOMI, no conjunctival pallor, no scleral icterus  PULM - Diffuse Biphasic Wheezing B/L,  symmetric breath sounds  CV -  S1 S2, no murmurs 2+ Pulses B/L UE  GI - (-) Armando's, (-) Rovsings, (-) McBurneys; NT/ND, soft, no guarding, no rebound, no masses    MSK/EXT- no CVA tenderness; no edema, no gross deformity, warm and well perfused, no calf tenderness/swelling/erythema   SKIN - no rash or bruising  NEUROLOGIC - alert, moving all 4 ext with 5/5 Strength

## 2022-05-28 NOTE — ED PROVIDER NOTE - NS ED ROS FT
Constitution: No Fever or chills, No Weight Loss,   HEENT: (+) cough, No Discharge, No Rhinorrhea  Cardio: No Chest pain, No Palpitations, (+) Dyspnea  Resp: (+) SOB, (+) Wheezing  GI: No abdominal pain, No Nausea, No Vomiting, No Constipation, No Diarrhea  : No burning upon urination, trouble urinating, no foul odor from urine  MSK: No Back pain, No Numbness, No Tingling, No Weakness  Neuro: No Headache, No changes to Vision, No changes to Hearing, Normal Gait  Skin: No rashes, No Bruising, No Swelling

## 2022-05-28 NOTE — ED PROVIDER NOTE - PROGRESS NOTE DETAILS
Resident Ada: preliminary eval concerning for Asthma Exacerbation, much improved s/p 3B2B - peak flows improving. Initial Trop elevated, will trend. Case endorsed to Hospitalist (DO Belle). Will continue to monitor and treat q4.

## 2022-05-28 NOTE — ED PROVIDER NOTE - ATTENDING CONTRIBUTION TO CARE
------------ATTENDING NOTE------------  pt w/ son c/o worsening SOB, wheezing, unproductive cough, constant mild chest tightness, no fevers, worsening over day, only mild improvement w/ his albuterol mdi, no edema/calf tenderness, awaiting labs/imaging and close reassessments -->  - Navarro Moore MD   ---------------------------------------------- ------------ATTENDING NOTE------------  pt w/ son c/o worsening SOB, wheezing, unproductive cough, constant mild chest tightness, no fevers, worsening over day, only mild improvement w/ his albuterol mdi, no edema/calf tenderness, awaiting labs/imaging and close reassessments --> significant continued improvement w/ tx, labs/imaging overall wnl, admitting for continued tx, w/u, optimize medical mgmt, outpt needs assessments.  - Navarro Moore MD   ----------------------------------------------

## 2022-05-28 NOTE — ED PROVIDER NOTE - NSICDXPASTSURGICALHX_GEN_ALL_CORE_FT
PAST SURGICAL HISTORY:  S/P cataract surgery     S/P hernia repair umbilical hernia repair 2011    S/P knee replacement, bilateral

## 2022-05-28 NOTE — ED PROVIDER NOTE - OBJECTIVE STATEMENT
81 male, Hx: HTN, HLD, Asthma, BPH - presents with cc: wheezing for the last 2-3 days, acutely worsening today. Pt is a known asthmatic - reports last treatment this evening around 10:30. Denies any fevers, chills, CP, abdominal pain, nausea, vomiting, diarrhea, bloody stools, dysuric symptoms. Compliant with Warfarin. Denies any prior history of PE/DVT, no recent surg/hosp, denies any calf tenderness/swelling/erythema.

## 2022-05-29 DIAGNOSIS — I48.0 PAROXYSMAL ATRIAL FIBRILLATION: ICD-10-CM

## 2022-05-29 DIAGNOSIS — J44.9 CHRONIC OBSTRUCTIVE PULMONARY DISEASE, UNSPECIFIED: ICD-10-CM

## 2022-05-29 DIAGNOSIS — E87.1 HYPO-OSMOLALITY AND HYPONATREMIA: ICD-10-CM

## 2022-05-29 DIAGNOSIS — J45.901 UNSPECIFIED ASTHMA WITH (ACUTE) EXACERBATION: ICD-10-CM

## 2022-05-29 DIAGNOSIS — R09.89 OTHER SPECIFIED SYMPTOMS AND SIGNS INVOLVING THE CIRCULATORY AND RESPIRATORY SYSTEMS: ICD-10-CM

## 2022-05-29 DIAGNOSIS — I10 ESSENTIAL (PRIMARY) HYPERTENSION: ICD-10-CM

## 2022-05-29 LAB
A1C WITH ESTIMATED AVERAGE GLUCOSE RESULT: 6.4 % — HIGH (ref 4–5.6)
ALBUMIN SERPL ELPH-MCNC: 4.4 G/DL — SIGNIFICANT CHANGE UP (ref 3.3–5)
ALBUMIN SERPL ELPH-MCNC: 4.5 G/DL — SIGNIFICANT CHANGE UP (ref 3.3–5)
ALP SERPL-CCNC: 102 U/L — SIGNIFICANT CHANGE UP (ref 40–120)
ALP SERPL-CCNC: 115 U/L — SIGNIFICANT CHANGE UP (ref 40–120)
ALT FLD-CCNC: 16 U/L — SIGNIFICANT CHANGE UP (ref 10–45)
ALT FLD-CCNC: 18 U/L — SIGNIFICANT CHANGE UP (ref 10–45)
ANION GAP SERPL CALC-SCNC: 11 MMOL/L — SIGNIFICANT CHANGE UP (ref 5–17)
ANION GAP SERPL CALC-SCNC: 16 MMOL/L — SIGNIFICANT CHANGE UP (ref 5–17)
APTT BLD: 49 SEC — HIGH (ref 27.5–35.5)
APTT BLD: 49.7 SEC — HIGH (ref 27.5–35.5)
AST SERPL-CCNC: 24 U/L — SIGNIFICANT CHANGE UP (ref 10–40)
AST SERPL-CCNC: 28 U/L — SIGNIFICANT CHANGE UP (ref 10–40)
BASOPHILS # BLD AUTO: 0.05 K/UL — SIGNIFICANT CHANGE UP (ref 0–0.2)
BASOPHILS NFR BLD AUTO: 0.8 % — SIGNIFICANT CHANGE UP (ref 0–2)
BILIRUB SERPL-MCNC: 0.6 MG/DL — SIGNIFICANT CHANGE UP (ref 0.2–1.2)
BILIRUB SERPL-MCNC: 1 MG/DL — SIGNIFICANT CHANGE UP (ref 0.2–1.2)
BUN SERPL-MCNC: 10 MG/DL — SIGNIFICANT CHANGE UP (ref 7–23)
BUN SERPL-MCNC: 11 MG/DL — SIGNIFICANT CHANGE UP (ref 7–23)
CALCIUM SERPL-MCNC: 8.7 MG/DL — SIGNIFICANT CHANGE UP (ref 8.4–10.5)
CALCIUM SERPL-MCNC: 9.1 MG/DL — SIGNIFICANT CHANGE UP (ref 8.4–10.5)
CHLORIDE SERPL-SCNC: 88 MMOL/L — LOW (ref 96–108)
CHLORIDE SERPL-SCNC: 89 MMOL/L — LOW (ref 96–108)
CO2 SERPL-SCNC: 21 MMOL/L — LOW (ref 22–31)
CO2 SERPL-SCNC: 24 MMOL/L — SIGNIFICANT CHANGE UP (ref 22–31)
CREAT ?TM UR-MCNC: 20 MG/DL — SIGNIFICANT CHANGE UP
CREAT SERPL-MCNC: 0.8 MG/DL — SIGNIFICANT CHANGE UP (ref 0.5–1.3)
CREAT SERPL-MCNC: 0.86 MG/DL — SIGNIFICANT CHANGE UP (ref 0.5–1.3)
EGFR: 87 ML/MIN/1.73M2 — SIGNIFICANT CHANGE UP
EGFR: 89 ML/MIN/1.73M2 — SIGNIFICANT CHANGE UP
EOSINOPHIL # BLD AUTO: 0.57 K/UL — HIGH (ref 0–0.5)
EOSINOPHIL NFR BLD AUTO: 8.6 % — HIGH (ref 0–6)
ESTIMATED AVERAGE GLUCOSE: 137 MG/DL — HIGH (ref 68–114)
GLUCOSE SERPL-MCNC: 131 MG/DL — HIGH (ref 70–99)
GLUCOSE SERPL-MCNC: 151 MG/DL — HIGH (ref 70–99)
HCT VFR BLD CALC: 44 % — SIGNIFICANT CHANGE UP (ref 39–50)
HCT VFR BLD CALC: 44.8 % — SIGNIFICANT CHANGE UP (ref 39–50)
HGB BLD-MCNC: 15.6 G/DL — SIGNIFICANT CHANGE UP (ref 13–17)
HGB BLD-MCNC: 16 G/DL — SIGNIFICANT CHANGE UP (ref 13–17)
IMM GRANULOCYTES NFR BLD AUTO: 0.2 % — SIGNIFICANT CHANGE UP (ref 0–1.5)
INR BLD: 2.06 RATIO — HIGH (ref 0.88–1.16)
INR BLD: 2.29 RATIO — HIGH (ref 0.88–1.16)
LYMPHOCYTES # BLD AUTO: 1.26 K/UL — SIGNIFICANT CHANGE UP (ref 1–3.3)
LYMPHOCYTES # BLD AUTO: 19 % — SIGNIFICANT CHANGE UP (ref 13–44)
MAGNESIUM SERPL-MCNC: 2 MG/DL — SIGNIFICANT CHANGE UP (ref 1.6–2.6)
MAGNESIUM SERPL-MCNC: 2.3 MG/DL — SIGNIFICANT CHANGE UP (ref 1.6–2.6)
MCHC RBC-ENTMCNC: 33.3 PG — SIGNIFICANT CHANGE UP (ref 27–34)
MCHC RBC-ENTMCNC: 33.4 PG — SIGNIFICANT CHANGE UP (ref 27–34)
MCHC RBC-ENTMCNC: 35.5 GM/DL — SIGNIFICANT CHANGE UP (ref 32–36)
MCHC RBC-ENTMCNC: 35.7 GM/DL — SIGNIFICANT CHANGE UP (ref 32–36)
MCV RBC AUTO: 93.5 FL — SIGNIFICANT CHANGE UP (ref 80–100)
MCV RBC AUTO: 94 FL — SIGNIFICANT CHANGE UP (ref 80–100)
MONOCYTES # BLD AUTO: 0.51 K/UL — SIGNIFICANT CHANGE UP (ref 0–0.9)
MONOCYTES NFR BLD AUTO: 7.7 % — SIGNIFICANT CHANGE UP (ref 2–14)
NEUTROPHILS # BLD AUTO: 4.23 K/UL — SIGNIFICANT CHANGE UP (ref 1.8–7.4)
NEUTROPHILS NFR BLD AUTO: 63.7 % — SIGNIFICANT CHANGE UP (ref 43–77)
NRBC # BLD: 0 /100 WBCS — SIGNIFICANT CHANGE UP (ref 0–0)
NRBC # BLD: 0 /100 WBCS — SIGNIFICANT CHANGE UP (ref 0–0)
NT-PROBNP SERPL-SCNC: 621 PG/ML — HIGH (ref 0–300)
OSMOLALITY UR: 186 MOS/KG — LOW (ref 300–900)
PHOSPHATE SERPL-MCNC: 1.9 MG/DL — LOW (ref 2.5–4.5)
PLATELET # BLD AUTO: 146 K/UL — LOW (ref 150–400)
PLATELET # BLD AUTO: 152 K/UL — SIGNIFICANT CHANGE UP (ref 150–400)
POTASSIUM SERPL-MCNC: 4.3 MMOL/L — SIGNIFICANT CHANGE UP (ref 3.5–5.3)
POTASSIUM SERPL-MCNC: 4.3 MMOL/L — SIGNIFICANT CHANGE UP (ref 3.5–5.3)
POTASSIUM SERPL-SCNC: 4.3 MMOL/L — SIGNIFICANT CHANGE UP (ref 3.5–5.3)
POTASSIUM SERPL-SCNC: 4.3 MMOL/L — SIGNIFICANT CHANGE UP (ref 3.5–5.3)
PROCALCITONIN SERPL-MCNC: <0.03 NG/ML — SIGNIFICANT CHANGE UP (ref 0.02–0.1)
PROT SERPL-MCNC: 7.4 G/DL — SIGNIFICANT CHANGE UP (ref 6–8.3)
PROT SERPL-MCNC: 7.5 G/DL — SIGNIFICANT CHANGE UP (ref 6–8.3)
PROTHROM AB SERPL-ACNC: 24.1 SEC — HIGH (ref 10.5–13.4)
PROTHROM AB SERPL-ACNC: 26.6 SEC — HIGH (ref 10.5–13.4)
RAPID RVP RESULT: SIGNIFICANT CHANGE UP
RBC # BLD: 4.68 M/UL — SIGNIFICANT CHANGE UP (ref 4.2–5.8)
RBC # BLD: 4.79 M/UL — SIGNIFICANT CHANGE UP (ref 4.2–5.8)
RBC # FLD: 11.8 % — SIGNIFICANT CHANGE UP (ref 10.3–14.5)
RBC # FLD: 11.8 % — SIGNIFICANT CHANGE UP (ref 10.3–14.5)
SARS-COV-2 RNA SPEC QL NAA+PROBE: SIGNIFICANT CHANGE UP
SODIUM SERPL-SCNC: 124 MMOL/L — LOW (ref 135–145)
SODIUM SERPL-SCNC: 125 MMOL/L — LOW (ref 135–145)
SODIUM UR-SCNC: 37 MMOL/L — SIGNIFICANT CHANGE UP
TROPONIN T, HIGH SENSITIVITY RESULT: 9 NG/L — SIGNIFICANT CHANGE UP (ref 0–51)
TSH SERPL-MCNC: 0.97 UIU/ML — SIGNIFICANT CHANGE UP (ref 0.27–4.2)
URATE UR-MCNC: 9.2 MG/DL — SIGNIFICANT CHANGE UP
WBC # BLD: 6.63 K/UL — SIGNIFICANT CHANGE UP (ref 3.8–10.5)
WBC # BLD: 7.52 K/UL — SIGNIFICANT CHANGE UP (ref 3.8–10.5)
WBC # FLD AUTO: 6.63 K/UL — SIGNIFICANT CHANGE UP (ref 3.8–10.5)
WBC # FLD AUTO: 7.52 K/UL — SIGNIFICANT CHANGE UP (ref 3.8–10.5)

## 2022-05-29 PROCEDURE — 71045 X-RAY EXAM CHEST 1 VIEW: CPT | Mod: 26

## 2022-05-29 PROCEDURE — 93970 EXTREMITY STUDY: CPT | Mod: 26

## 2022-05-29 PROCEDURE — 93010 ELECTROCARDIOGRAM REPORT: CPT

## 2022-05-29 PROCEDURE — 99223 1ST HOSP IP/OBS HIGH 75: CPT

## 2022-05-29 RX ORDER — BENZOCAINE AND MENTHOL 5; 1 G/100ML; G/100ML
1 LIQUID ORAL
Refills: 0 | Status: DISCONTINUED | OUTPATIENT
Start: 2022-05-29 | End: 2022-06-02

## 2022-05-29 RX ORDER — WARFARIN SODIUM 2.5 MG/1
1 TABLET ORAL
Qty: 0 | Refills: 0 | DISCHARGE

## 2022-05-29 RX ORDER — LANOLIN ALCOHOL/MO/W.PET/CERES
3 CREAM (GRAM) TOPICAL ONCE
Refills: 0 | Status: COMPLETED | OUTPATIENT
Start: 2022-05-29 | End: 2022-05-29

## 2022-05-29 RX ORDER — SODIUM,POTASSIUM PHOSPHATES 278-250MG
1 POWDER IN PACKET (EA) ORAL ONCE
Refills: 0 | Status: COMPLETED | OUTPATIENT
Start: 2022-05-29 | End: 2022-05-29

## 2022-05-29 RX ORDER — ACETAMINOPHEN 500 MG
650 TABLET ORAL EVERY 6 HOURS
Refills: 0 | Status: DISCONTINUED | OUTPATIENT
Start: 2022-05-29 | End: 2022-06-02

## 2022-05-29 RX ORDER — IPRATROPIUM/ALBUTEROL SULFATE 18-103MCG
3 AEROSOL WITH ADAPTER (GRAM) INHALATION EVERY 6 HOURS
Refills: 0 | Status: DISCONTINUED | OUTPATIENT
Start: 2022-05-29 | End: 2022-06-02

## 2022-05-29 RX ORDER — LOVASTATIN 20 MG
1 TABLET ORAL
Qty: 0 | Refills: 0 | DISCHARGE

## 2022-05-29 RX ORDER — ALBUTEROL 90 UG/1
2.5 AEROSOL, METERED ORAL ONCE
Refills: 0 | Status: COMPLETED | OUTPATIENT
Start: 2022-05-29 | End: 2022-05-29

## 2022-05-29 RX ORDER — TAMSULOSIN HYDROCHLORIDE 0.4 MG/1
0.4 CAPSULE ORAL AT BEDTIME
Refills: 0 | Status: DISCONTINUED | OUTPATIENT
Start: 2022-05-29 | End: 2022-06-02

## 2022-05-29 RX ORDER — MOMETASONE FUROATE AND FORMOTEROL FUMARATE DIHYDRATE 200; 5 UG/1; UG/1
2 AEROSOL RESPIRATORY (INHALATION)
Qty: 0 | Refills: 0 | DISCHARGE

## 2022-05-29 RX ORDER — ALBUTEROL 90 UG/1
2 AEROSOL, METERED ORAL
Qty: 0 | Refills: 0 | DISCHARGE

## 2022-05-29 RX ORDER — WARFARIN SODIUM 2.5 MG/1
5 TABLET ORAL ONCE
Refills: 0 | Status: DISCONTINUED | OUTPATIENT
Start: 2022-05-29 | End: 2022-05-29

## 2022-05-29 RX ORDER — CARVEDILOL PHOSPHATE 80 MG/1
12.5 CAPSULE, EXTENDED RELEASE ORAL EVERY 12 HOURS
Refills: 0 | Status: DISCONTINUED | OUTPATIENT
Start: 2022-05-29 | End: 2022-05-29

## 2022-05-29 RX ORDER — LORATADINE 10 MG/1
10 TABLET ORAL DAILY
Refills: 0 | Status: DISCONTINUED | OUTPATIENT
Start: 2022-05-29 | End: 2022-06-02

## 2022-05-29 RX ORDER — ALLOPURINOL 300 MG
300 TABLET ORAL DAILY
Refills: 0 | Status: DISCONTINUED | OUTPATIENT
Start: 2022-05-29 | End: 2022-06-02

## 2022-05-29 RX ORDER — METFORMIN HYDROCHLORIDE 850 MG/1
1 TABLET ORAL
Qty: 0 | Refills: 0 | DISCHARGE

## 2022-05-29 RX ORDER — LOSARTAN POTASSIUM 100 MG/1
50 TABLET, FILM COATED ORAL DAILY
Refills: 0 | Status: DISCONTINUED | OUTPATIENT
Start: 2022-05-29 | End: 2022-06-02

## 2022-05-29 RX ORDER — MONTELUKAST 4 MG/1
10 TABLET, CHEWABLE ORAL DAILY
Refills: 0 | Status: DISCONTINUED | OUTPATIENT
Start: 2022-05-29 | End: 2022-06-02

## 2022-05-29 RX ORDER — CARVEDILOL PHOSPHATE 80 MG/1
25 CAPSULE, EXTENDED RELEASE ORAL EVERY 12 HOURS
Refills: 0 | Status: DISCONTINUED | OUTPATIENT
Start: 2022-05-29 | End: 2022-06-02

## 2022-05-29 RX ORDER — BUDESONIDE AND FORMOTEROL FUMARATE DIHYDRATE 160; 4.5 UG/1; UG/1
2 AEROSOL RESPIRATORY (INHALATION)
Refills: 0 | Status: DISCONTINUED | OUTPATIENT
Start: 2022-05-29 | End: 2022-06-02

## 2022-05-29 RX ORDER — SODIUM CHLORIDE 9 MG/ML
1000 INJECTION INTRAMUSCULAR; INTRAVENOUS; SUBCUTANEOUS
Refills: 0 | Status: DISCONTINUED | OUTPATIENT
Start: 2022-05-29 | End: 2022-05-30

## 2022-05-29 RX ORDER — WARFARIN SODIUM 2.5 MG/1
5 TABLET ORAL ONCE
Refills: 0 | Status: COMPLETED | OUTPATIENT
Start: 2022-05-29 | End: 2022-05-29

## 2022-05-29 RX ADMIN — Medication 20 MILLIGRAM(S): at 21:19

## 2022-05-29 RX ADMIN — Medication 20 MILLIGRAM(S): at 05:04

## 2022-05-29 RX ADMIN — Medication 650 MILLIGRAM(S): at 22:54

## 2022-05-29 RX ADMIN — Medication 60 MILLIGRAM(S): at 00:17

## 2022-05-29 RX ADMIN — TAMSULOSIN HYDROCHLORIDE 0.4 MILLIGRAM(S): 0.4 CAPSULE ORAL at 21:20

## 2022-05-29 RX ADMIN — BENZOCAINE AND MENTHOL 1 LOZENGE: 5; 1 LIQUID ORAL at 21:53

## 2022-05-29 RX ADMIN — Medication 650 MILLIGRAM(S): at 21:54

## 2022-05-29 RX ADMIN — Medication 3 MILLILITER(S): at 05:05

## 2022-05-29 RX ADMIN — Medication 3 MILLILITER(S): at 18:01

## 2022-05-29 RX ADMIN — Medication 3 MILLIGRAM(S): at 21:54

## 2022-05-29 RX ADMIN — LORATADINE 10 MILLIGRAM(S): 10 TABLET ORAL at 11:42

## 2022-05-29 RX ADMIN — Medication 20 MILLIGRAM(S): at 16:00

## 2022-05-29 RX ADMIN — SODIUM CHLORIDE 100 MILLILITER(S): 9 INJECTION INTRAMUSCULAR; INTRAVENOUS; SUBCUTANEOUS at 21:33

## 2022-05-29 RX ADMIN — Medication 1 PACKET(S): at 11:43

## 2022-05-29 RX ADMIN — WARFARIN SODIUM 5 MILLIGRAM(S): 2.5 TABLET ORAL at 21:20

## 2022-05-29 RX ADMIN — Medication 300 MILLIGRAM(S): at 11:42

## 2022-05-29 RX ADMIN — Medication 3 MILLILITER(S): at 11:43

## 2022-05-29 RX ADMIN — BUDESONIDE AND FORMOTEROL FUMARATE DIHYDRATE 2 PUFF(S): 160; 4.5 AEROSOL RESPIRATORY (INHALATION) at 18:03

## 2022-05-29 RX ADMIN — ALBUTEROL 2.5 MILLIGRAM(S): 90 AEROSOL, METERED ORAL at 00:46

## 2022-05-29 RX ADMIN — CARVEDILOL PHOSPHATE 25 MILLIGRAM(S): 80 CAPSULE, EXTENDED RELEASE ORAL at 18:01

## 2022-05-29 RX ADMIN — BUDESONIDE AND FORMOTEROL FUMARATE DIHYDRATE 2 PUFF(S): 160; 4.5 AEROSOL RESPIRATORY (INHALATION) at 05:05

## 2022-05-29 RX ADMIN — MONTELUKAST 10 MILLIGRAM(S): 4 TABLET, CHEWABLE ORAL at 11:42

## 2022-05-29 RX ADMIN — Medication 3 MILLILITER(S): at 23:06

## 2022-05-29 RX ADMIN — LOSARTAN POTASSIUM 50 MILLIGRAM(S): 100 TABLET, FILM COATED ORAL at 05:05

## 2022-05-29 RX ADMIN — CARVEDILOL PHOSPHATE 12.5 MILLIGRAM(S): 80 CAPSULE, EXTENDED RELEASE ORAL at 05:05

## 2022-05-29 NOTE — PATIENT PROFILE ADULT - NSPROGENPREVTRANSF_GEN_A_NUR
Called pt and informed him that MD doses Adderall twice a day, and not three times a day. Pt agreed to stay on 15mg twice a day.    no

## 2022-05-29 NOTE — PROVIDER CONTACT NOTE (OTHER) - ASSESSMENT
pt resting in bed all other VSS. no c/o pain, no s/s of distress. HR jumping from 80s-100s.  Pt received all BP medication this morning

## 2022-05-29 NOTE — ED ADULT NURSE NOTE - NSIMPLEMENTINTERV_GEN_ALL_ED
Implemented All Universal Safety Interventions:  [place] to call system. Call bell, personal items and telephone within reach. Instruct patient to call for assistance. Room bathroom lighting operational. Non-slip footwear when patient is off stretcher. Physically safe environment: no spills, clutter or unnecessary equipment. Stretcher in lowest position, wheels locked, appropriate side rails in place.

## 2022-05-29 NOTE — PATIENT PROFILE ADULT - FALL HARM RISK - HARM RISK INTERVENTIONS

## 2022-05-29 NOTE — ED ADULT NURSE REASSESSMENT NOTE - NS ED NURSE REASSESS COMMENT FT1
Pt provided with PEAK flow meter. Best of 3 expirations read at 130 LPM prior to 4th albuterol treatment. Best of 3 after 4th treatment read at 170 LPM. Pt respirations much improved, with minimal expiratory wheezes noted on ascultation and no accessory muscle usage. Pt states that "[he] feels better."

## 2022-05-29 NOTE — PATIENT PROFILE ADULT - FUNCTIONAL ASSESSMENT - DAILY ACTIVITY SECTION LABEL
. Solaraze Pregnancy And Lactation Text: This medication is Pregnancy Category B and is considered safe. There is some data to suggest avoiding during the third trimester. It is unknown if this medication is excreted in breast milk.

## 2022-05-29 NOTE — H&P ADULT - HISTORY OF PRESENT ILLNESS
81M c hx HTN, DM2, asthma, BPH, Afib on coumadin, right RCC s/p nephrectomy, pw worsening SOB, wheezing.    History from pt and pt's son Norris Angel at bedside. Pt has had couple years of slowly progressive worsening memory and cognition, despite being A&Ox3. Pt ambulates to the bathroom by self without assistive device.  Pt has had 2-3 weeks of dry coughing, and 2-3 days of worsening wheezing and SOB. Pt's son thinks it might be the pollen trigger his symptoms. no sick contacts at home. Pt denies fevers, chills, chest pain, urinary symptoms. Pt is on a low salt diet. Pt tried to use nebulizer at home yesterday for the first time. Pt gets asthma exacerbations once every couple years. Pt denies any exacerbating factors. Pt's symptoms improved after receive steroids and duonebs in the ED.

## 2022-05-29 NOTE — H&P ADULT - NSHPLABSRESULTS_GEN_ALL_CORE
Personally reviewed old records.  Personally reviewed labs.  Personally reviewed imaging.                        15.6   6.63  )-----------( 152      ( 28 May 2022 23:55 )             44.0       05-28    124<L>  |  89<L>  |  11  ----------------------------<  131<H>  4.3   |  24  |  0.86    Ca    8.7      28 May 2022 23:55  Mg     2.0     05-28    TPro  7.4  /  Alb  4.4  /  TBili  0.6  /  DBili  x   /  AST  28  /  ALT  18  /  AlkPhos  115  05-28            LIVER FUNCTIONS - ( 28 May 2022 23:55 )  Alb: 4.4 g/dL / Pro: 7.4 g/dL / ALK PHOS: 115 U/L / ALT: 18 U/L / AST: 28 U/L / GGT: x             PT/INR - ( 28 May 2022 23:55 )   PT: 26.6 sec;   INR: 2.29 ratio         PTT - ( 28 May 2022 23:55 )  PTT:49.0 sec

## 2022-05-29 NOTE — PROGRESS NOTE ADULT - SUBJECTIVE AND OBJECTIVE BOX
Name of Patient : STEFAN DURAND  MRN: [id number]  Date of visit: 05-29-22       Subjective: Patient seen and examined. No new events except as noted.       REVIEW OF SYSTEMS:    CONSTITUTIONAL: No weakness, fevers or chills  EYES/ENT: No visual changes;  No vertigo or throat pain   NECK: No pain or stiffness  RESPIRATORY: No cough, wheezing, hemoptysis; No shortness of breath  CARDIOVASCULAR: No chest pain or palpitations  GASTROINTESTINAL: No abdominal or epigastric pain. No nausea, vomiting, or hematemesis; No diarrhea or constipation. No melena or hematochezia.  GENITOURINARY: No dysuria, frequency or hematuria  NEUROLOGICAL: No numbness or weakness  SKIN: No itching, burning, rashes, or lesions   All other review of systems is negative unless indicated above.    MEDICATIONS:  MEDICATIONS  (STANDING):  albuterol/ipratropium for Nebulization 3 milliLiter(s) Nebulizer every 6 hours  allopurinol 300 milliGRAM(s) Oral daily  budesonide 160 MICROgram(s)/formoterol 4.5 MICROgram(s) Inhaler 2 Puff(s) Inhalation two times a day  carvedilol 25 milliGRAM(s) Oral every 12 hours  loratadine 10 milliGRAM(s) Oral daily  losartan 50 milliGRAM(s) Oral daily  methylPREDNISolone sodium succinate Injectable 20 milliGRAM(s) IV Push three times a day  montelukast 10 milliGRAM(s) Oral daily  sodium chloride 0.9%. 1000 milliLiter(s) (100 mL/Hr) IV Continuous <Continuous>  tamsulosin 0.4 milliGRAM(s) Oral at bedtime      PHYSICAL EXAM:  T(C): 36.7 (05-29-22 @ 21:53), Max: 37.2 (05-29-22 @ 09:50)  HR: 83 (05-29-22 @ 21:53) (81 - 117)  BP: 126/91 (05-29-22 @ 21:53) (111/83 - 156/103)  RR: 18 (05-29-22 @ 21:53) (18 - 28)  SpO2: 93% (05-29-22 @ 21:53) (92% - 100%)  Wt(kg): --  I&O's Summary    28 May 2022 07:01  -  29 May 2022 07:00  --------------------------------------------------------  IN: 0 mL / OUT: 300 mL / NET: -300 mL    29 May 2022 07:01  -  29 May 2022 22:33  --------------------------------------------------------  IN: 580 mL / OUT: 950 mL / NET: -370 mL      Height (cm): 170.2 (05-28 @ 23:42)  Weight (kg): 127 (05-28 @ 23:42)  BMI (kg/m2): 43.8 (05-28 @ 23:42)  BSA (m2): 2.33 (05-28 @ 23:42)    Appearance: Normal	  HEENT:  PERRLA   Lymphatic: No lymphadenopathy   Cardiovascular: Normal S1 S2, no JVD  Respiratory: normal effort , clear  Gastrointestinal:  Soft, Non-tender  Skin: No rashes,  warm to touch  Psychiatry:  Mood & affect appropriate  Musculuskeletal: No edema      All labs, Imaging and EKGs personally reviewed     05-28-22 @ 07:01  -  05-29-22 @ 07:00  --------------------------------------------------------  IN: 0 mL / OUT: 300 mL / NET: -300 mL    05-29-22 @ 07:01  -  05-29-22 @ 22:33  --------------------------------------------------------  IN: 580 mL / OUT: 950 mL / NET: -370 mL                            16.0   7.52  )-----------( 146      ( 29 May 2022 07:15 )             44.8               05-29    125<L>  |  88<L>  |  10  ----------------------------<  151<H>  4.3   |  21<L>  |  0.80    Ca    9.1      29 May 2022 07:15  Phos  1.9     05-29  Mg     2.3     05-29    TPro  7.5  /  Alb  4.5  /  TBili  1.0  /  DBili  x   /  AST  24  /  ALT  16  /  AlkPhos  102  05-29    PT/INR - ( 29 May 2022 07:15 )   PT: 24.1 sec;   INR: 2.06 ratio         PTT - ( 29 May 2022 07:15 )  PTT:49.7 sec

## 2022-05-29 NOTE — H&P ADULT - NSHPSOCIALHISTORY_GEN_ALL_CORE
Social History:    Marital Status: (  ) , (  ) Single, (  ) , ( x ) , (  )   # of Children: 3  Lives with: (  ) alone, ( x ) children, (  ) spouse, (  ) parents, (  ) siblings, (  ) friends, (  ) other:   Occupation:     Substance Use/Illicit Drugs: (  ) never used vs other:   Tobacco Usage: (  x) never smoked, (  ) former smoker, (  ) current smoker and Total Pack-Years:   Last Alcohol Usage/Frequency/Amount/Withdrawal/Hx of Abuse:  beer 2 weeks ago  Foreign travel:   Animal exposure:

## 2022-05-29 NOTE — PHYSICAL THERAPY INITIAL EVALUATION ADULT - PERTINENT HX OF CURRENT PROBLEM, REHAB EVAL
81M c hx HTN, DM2, asthma, BPH, Afib on coumadin, right RCC s/p nephrectomy, pw worsening SOB, wheezing.

## 2022-05-29 NOTE — ED ADULT NURSE NOTE - OBJECTIVE STATEMENT
81 y.o. M A&Ox4 PMHx of Asthma, HTN, HLD, Afib, gout, BPH presenting to ED via walk-in triage for SOB. Pt son at bedside states that pt was wheezing all day. Pt states that he took his rescue inhalers 4 times throughout the day, and when they didn't work he tried his at-home nebulizer treatment. States that treatment did not provide relief, so he came to the ED for treatment. Pt denies fever/chills, H/A, lightheadedness/dizziness, vision changes, chest pain, abd pain, N/V/D, constipation, dysuria, hematuria, hematochezia, weakness, numbness, and tingling. Pt alert, grossly neurologically intact. Pupils PERRLA and no drainage noted. Chest rise symmetrical with wheezing noted bilaterally L/S, and pt is tachypneic to the 30s with accessory muscle usage. Skin is warm, dry, and normal for race. No cyanosis noted to lips or fingernails. Negative clubbed fingernails. Radial pulses equal and +2 bilatarally. Abd soft, nontender, nondistended. Normoactive bowel sounds noted to all 4 quadrants. ROM intact and strength +5 in all four extremities. No edema noted to bilateral legs or arms. ED Attending Foster at bedside for eval. Pt resting in stretcher in position of comfort. Stretcher locked and lowered and call bell within reach. 81 y.o. M A&Ox4 PMHx of Asthma, HTN, HLD, Afib, gout, BPH presenting to ED via walk-in triage for SOB. Pt son at bedside states that pt was wheezing all day. Pt states that he took his rescue inhalers 4 times throughout the day, and when they didn't work he tried his at-home nebulizer treatment. States that treatment did not provide relief, so he came to the ED for treatment. Pt denies fever/chills, H/A, lightheadedness/dizziness, vision changes, chest pain, abd pain, N/V/D, constipation, dysuria, hematuria, hematochezia, weakness, numbness, and tingling. Pt alert, grossly neurologically intact. Pupils PERRLA and no drainage noted from eyes. Chest rise symmetrical with wheezing noted bilaterally and pt is tachypneic to the 30s with accessory muscle usage. Skin is warm, dry, and normal for race. No cyanosis noted to lips or fingernails. Negative clubbed fingernails. Radial pulses equal and +2 bilaterally. Abd soft, nontender, nondistended. ROM intact and strength +5 in all four extremities. No edema noted to bilateral legs or arms. ED Attending Oscar at bedside for eval. Son at bedside. Pt resting in stretcher in position of comfort. Stretcher locked and lowered and call bell within reach.

## 2022-05-29 NOTE — H&P ADULT - NSHPPHYSICALEXAM_GEN_ALL_CORE
PHYSICAL EXAM:   GENERAL: Alert. Not confused. No acute distress. Not thin. Not cachectic. Not obese.  HEAD:  Atraumatic. Normocephalic.  EYES: EOMI. PERRLA. Normal conjunctiva/sclera.  ENT: Neck supple. No JVD. Moist oral mucosa. Not edentulous. No thrush.  LYMPH: Normal supraclavicular/cervical lymph nodes.   CARDIAC: Not tachy, Not jerman. Regular rhythm. Not irregularly irregular. S1. S2. No murmur. No rub. No distant heart sounds.  LUNG/CHEST: BS equal bilaterally. +end expiratory wheezes. No rales. No rhonchi.  ABDOMEN: Soft. No tenderness. No distension. No fluid wave. Normal bowel sounds.  BACK: No midline/vertebral tenderness. No flank tenderness.  VASCULAR: +2 b/l radial or ulnar pulses. Palpable DP pulses.  EXTREMITIES:  No clubbing. No cyanosis. 1-2+ b/l LE pitting edema. Moving all 4.  NEUROLOGY: A&Ox3. Non-focal exam. Cranial nerves intact. Normal speech. Sensation intact.  PSYCH: Normal behavior. Normal affect.  SKIN: No jaundice. No erythema. No rash/lesion.  Vascular Access:     ICU Vital Signs Last 24 Hrs  T(C): 36.9 (29 May 2022 02:25), Max: 36.9 (29 May 2022 02:25)  T(F): 98.4 (29 May 2022 02:25), Max: 98.4 (29 May 2022 02:25)  HR: 93 (29 May 2022 02:25) (81 - 117)  BP: 125/85 (29 May 2022 02:25) (118/76 - 156/103)  BP(mean): 91 (29 May 2022 01:41) (91 - 110)  ABP: --  ABP(mean): --  RR: 22 (29 May 2022 02:25) (19 - 28)  SpO2: 95% (29 May 2022 02:25) (92% - 100%)      I&O's Summary

## 2022-05-29 NOTE — H&P ADULT - NSHPREVIEWOFSYSTEMS_GEN_ALL_CORE
REVIEW OF SYSTEMS:  CONSTITUTIONAL: No weakness. No fevers. No chills. No rigors. No weight loss. No night sweats. No poor appetite.  EYES: No blurry or double vision. No eye pain.  ENT: No hearing difficulty. No vertigo. No dysphagia. No sore throat. No Sinusitis/rhinorrhea.   NECK: No pain. No stiffness/rigidity.  CARDIAC: No chest pain. No palpitations. No lightheadedness. No syncope.  RESPIRATORY: +cough. +SOB. No hemoptysis.  GASTROINTESTINAL: No abdominal pain. No nausea. No vomiting. No hematemesis. No diarrhea. No constipation.   GENITOURINARY: No dysuria. No frequency. No hesitancy.   NEUROLOGICAL: No numbness/tingling. No focal weakness. No headache. No unsteady gait.  BACK: No back pain. No flank pain.  EXTREMITIES: +lower extremity edema. Full ROM. No joint pain.  SKIN: No rashes. No itching. No other lesions.  ALLERGIC: No lip swelling. No hives.  All other review of systems is negative unless indicated above.  Unless indicated above, unable to assess ROS 2/2

## 2022-05-29 NOTE — H&P ADULT - ASSESSMENT
81M c hx HTN, DM2, asthma, BPH, Afib on coumadin, right RCC s/p nephrectomy, pw respiratory distress 2/2 asthma exacerbation, incidental hyponatremia

## 2022-05-30 LAB
ANION GAP SERPL CALC-SCNC: 10 MMOL/L — SIGNIFICANT CHANGE UP (ref 5–17)
ANION GAP SERPL CALC-SCNC: 12 MMOL/L — SIGNIFICANT CHANGE UP (ref 5–17)
ANION GAP SERPL CALC-SCNC: 12 MMOL/L — SIGNIFICANT CHANGE UP (ref 5–17)
APPEARANCE UR: CLEAR — SIGNIFICANT CHANGE UP
BILIRUB UR-MCNC: NEGATIVE — SIGNIFICANT CHANGE UP
BUN SERPL-MCNC: 17 MG/DL — SIGNIFICANT CHANGE UP (ref 7–23)
BUN SERPL-MCNC: 19 MG/DL — SIGNIFICANT CHANGE UP (ref 7–23)
BUN SERPL-MCNC: 45 MG/DL — HIGH (ref 7–23)
CALCIUM SERPL-MCNC: 8.3 MG/DL — LOW (ref 8.4–10.5)
CALCIUM SERPL-MCNC: 8.4 MG/DL — SIGNIFICANT CHANGE UP (ref 8.4–10.5)
CALCIUM SERPL-MCNC: 8.7 MG/DL — SIGNIFICANT CHANGE UP (ref 8.4–10.5)
CHLORIDE SERPL-SCNC: 84 MMOL/L — LOW (ref 96–108)
CHLORIDE SERPL-SCNC: 84 MMOL/L — LOW (ref 96–108)
CHLORIDE SERPL-SCNC: 86 MMOL/L — LOW (ref 96–108)
CO2 SERPL-SCNC: 21 MMOL/L — LOW (ref 22–31)
CO2 SERPL-SCNC: 21 MMOL/L — LOW (ref 22–31)
CO2 SERPL-SCNC: 22 MMOL/L — SIGNIFICANT CHANGE UP (ref 22–31)
COLOR SPEC: SIGNIFICANT CHANGE UP
CREAT SERPL-MCNC: 0.8 MG/DL — SIGNIFICANT CHANGE UP (ref 0.5–1.3)
CREAT SERPL-MCNC: 0.88 MG/DL — SIGNIFICANT CHANGE UP (ref 0.5–1.3)
CREAT SERPL-MCNC: 1.08 MG/DL — SIGNIFICANT CHANGE UP (ref 0.5–1.3)
DIFF PNL FLD: NEGATIVE — SIGNIFICANT CHANGE UP
EGFR: 69 ML/MIN/1.73M2 — SIGNIFICANT CHANGE UP
EGFR: 86 ML/MIN/1.73M2 — SIGNIFICANT CHANGE UP
EGFR: 89 ML/MIN/1.73M2 — SIGNIFICANT CHANGE UP
GLUCOSE SERPL-MCNC: 158 MG/DL — HIGH (ref 70–99)
GLUCOSE SERPL-MCNC: 165 MG/DL — HIGH (ref 70–99)
GLUCOSE SERPL-MCNC: 179 MG/DL — HIGH (ref 70–99)
GLUCOSE UR QL: ABNORMAL
HCT VFR BLD CALC: 39.7 % — SIGNIFICANT CHANGE UP (ref 39–50)
HGB BLD-MCNC: 14.2 G/DL — SIGNIFICANT CHANGE UP (ref 13–17)
INR BLD: 2.49 RATIO — HIGH (ref 0.88–1.16)
KETONES UR-MCNC: NEGATIVE — SIGNIFICANT CHANGE UP
LEUKOCYTE ESTERASE UR-ACNC: NEGATIVE — SIGNIFICANT CHANGE UP
MCHC RBC-ENTMCNC: 33.2 PG — SIGNIFICANT CHANGE UP (ref 27–34)
MCHC RBC-ENTMCNC: 35.8 GM/DL — SIGNIFICANT CHANGE UP (ref 32–36)
MCV RBC AUTO: 92.8 FL — SIGNIFICANT CHANGE UP (ref 80–100)
NITRITE UR-MCNC: NEGATIVE — SIGNIFICANT CHANGE UP
NRBC # BLD: 0 /100 WBCS — SIGNIFICANT CHANGE UP (ref 0–0)
OSMOLALITY UR: 395 MOS/KG — SIGNIFICANT CHANGE UP (ref 300–900)
PH UR: 6.5 — SIGNIFICANT CHANGE UP (ref 5–8)
PLATELET # BLD AUTO: 118 K/UL — LOW (ref 150–400)
POTASSIUM SERPL-MCNC: 4.6 MMOL/L — SIGNIFICANT CHANGE UP (ref 3.5–5.3)
POTASSIUM SERPL-MCNC: 4.7 MMOL/L — SIGNIFICANT CHANGE UP (ref 3.5–5.3)
POTASSIUM SERPL-MCNC: 5.2 MMOL/L — SIGNIFICANT CHANGE UP (ref 3.5–5.3)
POTASSIUM SERPL-SCNC: 4.6 MMOL/L — SIGNIFICANT CHANGE UP (ref 3.5–5.3)
POTASSIUM SERPL-SCNC: 4.7 MMOL/L — SIGNIFICANT CHANGE UP (ref 3.5–5.3)
POTASSIUM SERPL-SCNC: 5.2 MMOL/L — SIGNIFICANT CHANGE UP (ref 3.5–5.3)
PROT UR-MCNC: NEGATIVE — SIGNIFICANT CHANGE UP
PROTHROM AB SERPL-ACNC: 28.9 SEC — HIGH (ref 10.5–13.4)
RBC # BLD: 4.28 M/UL — SIGNIFICANT CHANGE UP (ref 4.2–5.8)
RBC # FLD: 11.5 % — SIGNIFICANT CHANGE UP (ref 10.3–14.5)
SODIUM SERPL-SCNC: 117 MMOL/L — CRITICAL LOW (ref 135–145)
SODIUM SERPL-SCNC: 117 MMOL/L — CRITICAL LOW (ref 135–145)
SODIUM SERPL-SCNC: 118 MMOL/L — CRITICAL LOW (ref 135–145)
SODIUM UR-SCNC: 12 MMOL/L — SIGNIFICANT CHANGE UP
SP GR SPEC: 1.01 — SIGNIFICANT CHANGE UP (ref 1.01–1.02)
UROBILINOGEN FLD QL: NEGATIVE — SIGNIFICANT CHANGE UP
WBC # BLD: 13.95 K/UL — HIGH (ref 3.8–10.5)
WBC # FLD AUTO: 13.95 K/UL — HIGH (ref 3.8–10.5)

## 2022-05-30 PROCEDURE — 93306 TTE W/DOPPLER COMPLETE: CPT | Mod: 26

## 2022-05-30 RX ORDER — SODIUM CHLORIDE 5 G/100ML
1000 INJECTION, SOLUTION INTRAVENOUS
Refills: 0 | Status: DISCONTINUED | OUTPATIENT
Start: 2022-05-30 | End: 2022-05-30

## 2022-05-30 RX ORDER — INSULIN LISPRO 100/ML
VIAL (ML) SUBCUTANEOUS
Refills: 0 | Status: DISCONTINUED | OUTPATIENT
Start: 2022-05-30 | End: 2022-06-02

## 2022-05-30 RX ORDER — DEXTROSE 50 % IN WATER 50 %
25 SYRINGE (ML) INTRAVENOUS ONCE
Refills: 0 | Status: DISCONTINUED | OUTPATIENT
Start: 2022-05-30 | End: 2022-06-02

## 2022-05-30 RX ORDER — SODIUM CHLORIDE 9 MG/ML
1000 INJECTION, SOLUTION INTRAVENOUS
Refills: 0 | Status: DISCONTINUED | OUTPATIENT
Start: 2022-05-30 | End: 2022-06-02

## 2022-05-30 RX ORDER — UREA 15 G
30 POWDER IN PACKET (EA) ORAL
Refills: 0 | Status: DISCONTINUED | OUTPATIENT
Start: 2022-05-30 | End: 2022-05-31

## 2022-05-30 RX ORDER — WARFARIN SODIUM 2.5 MG/1
4 TABLET ORAL ONCE
Refills: 0 | Status: COMPLETED | OUTPATIENT
Start: 2022-05-30 | End: 2022-05-30

## 2022-05-30 RX ORDER — DEXTROSE 50 % IN WATER 50 %
12.5 SYRINGE (ML) INTRAVENOUS ONCE
Refills: 0 | Status: DISCONTINUED | OUTPATIENT
Start: 2022-05-30 | End: 2022-06-02

## 2022-05-30 RX ORDER — GLUCAGON INJECTION, SOLUTION 0.5 MG/.1ML
1 INJECTION, SOLUTION SUBCUTANEOUS ONCE
Refills: 0 | Status: DISCONTINUED | OUTPATIENT
Start: 2022-05-30 | End: 2022-06-02

## 2022-05-30 RX ORDER — DEXTROSE 50 % IN WATER 50 %
15 SYRINGE (ML) INTRAVENOUS ONCE
Refills: 0 | Status: DISCONTINUED | OUTPATIENT
Start: 2022-05-30 | End: 2022-06-02

## 2022-05-30 RX ADMIN — Medication 200 MILLIGRAM(S): at 01:36

## 2022-05-30 RX ADMIN — Medication 200 MILLIGRAM(S): at 16:41

## 2022-05-30 RX ADMIN — Medication 300 MILLIGRAM(S): at 16:42

## 2022-05-30 RX ADMIN — Medication 20 MILLIGRAM(S): at 05:09

## 2022-05-30 RX ADMIN — WARFARIN SODIUM 4 MILLIGRAM(S): 2.5 TABLET ORAL at 22:18

## 2022-05-30 RX ADMIN — TAMSULOSIN HYDROCHLORIDE 0.4 MILLIGRAM(S): 0.4 CAPSULE ORAL at 22:18

## 2022-05-30 RX ADMIN — Medication 650 MILLIGRAM(S): at 22:49

## 2022-05-30 RX ADMIN — Medication 30 GRAM(S): at 16:43

## 2022-05-30 RX ADMIN — Medication 3 MILLILITER(S): at 17:42

## 2022-05-30 RX ADMIN — Medication 20 MILLIGRAM(S): at 22:19

## 2022-05-30 RX ADMIN — LOSARTAN POTASSIUM 50 MILLIGRAM(S): 100 TABLET, FILM COATED ORAL at 05:11

## 2022-05-30 RX ADMIN — BUDESONIDE AND FORMOTEROL FUMARATE DIHYDRATE 2 PUFF(S): 160; 4.5 AEROSOL RESPIRATORY (INHALATION) at 05:10

## 2022-05-30 RX ADMIN — Medication 3 MILLILITER(S): at 05:10

## 2022-05-30 RX ADMIN — BUDESONIDE AND FORMOTEROL FUMARATE DIHYDRATE 2 PUFF(S): 160; 4.5 AEROSOL RESPIRATORY (INHALATION) at 17:44

## 2022-05-30 RX ADMIN — CARVEDILOL PHOSPHATE 25 MILLIGRAM(S): 80 CAPSULE, EXTENDED RELEASE ORAL at 05:11

## 2022-05-30 RX ADMIN — MONTELUKAST 10 MILLIGRAM(S): 4 TABLET, CHEWABLE ORAL at 16:44

## 2022-05-30 RX ADMIN — Medication 1: at 18:22

## 2022-05-30 RX ADMIN — Medication 200 MILLIGRAM(S): at 22:19

## 2022-05-30 RX ADMIN — LORATADINE 10 MILLIGRAM(S): 10 TABLET ORAL at 16:43

## 2022-05-30 RX ADMIN — Medication 3 MILLILITER(S): at 11:35

## 2022-05-30 RX ADMIN — CARVEDILOL PHOSPHATE 25 MILLIGRAM(S): 80 CAPSULE, EXTENDED RELEASE ORAL at 17:47

## 2022-05-30 RX ADMIN — Medication 20 MILLIGRAM(S): at 16:43

## 2022-05-30 NOTE — CONSULT NOTE ADULT - SUBJECTIVE AND OBJECTIVE BOX
CHIEF COMPLAINT:Patient is a 81y old  Male who presents with a chief complaint of sob, wheezing (29 May 2022 03:16)      HISTORY OF PRESENT ILLNESS:  81 male known to me from office with history as below, afib on a/c , DM II, HTN , admitted with sob, wheezing , asthma exacerbation   no cp   no dizziness  no syncope  no palpitation       PAST MEDICAL & SURGICAL HISTORY:  HTN (hypertension)      BPH (benign prostatic hyperplasia)      Gout      GERD (gastroesophageal reflux disease)      GI bleed      HLD (hyperlipidemia)      DM (diabetes mellitus)      AF (atrial fibrillation)      Obesity      S/P knee replacement, bilateral      S/P cataract surgery      S/P hernia repair  umbilical hernia repair 2011              MEDICATIONS:  carvedilol 12.5 milliGRAM(s) Oral every 12 hours  losartan 50 milliGRAM(s) Oral daily  tamsulosin 0.4 milliGRAM(s) Oral at bedtime  warfarin 5 milliGRAM(s) Oral once      albuterol/ipratropium for Nebulization 3 milliLiter(s) Nebulizer every 6 hours  budesonide 160 MICROgram(s)/formoterol 4.5 MICROgram(s) Inhaler 2 Puff(s) Inhalation two times a day  loratadine 10 milliGRAM(s) Oral daily  montelukast 10 milliGRAM(s) Oral daily        allopurinol 300 milliGRAM(s) Oral daily  methylPREDNISolone sodium succinate Injectable 20 milliGRAM(s) IV Push three times a day        FAMILY HISTORY:  Family history of hypertension (Father)    Family history of prostate cancer in father (Father)        Non-contributory    SOCIAL HISTORY:    No tobacco, drugs or etoh    Allergies    pantoprazole (Rash)  penicillin (Unknown)    Intolerances    	    REVIEW OF SYSTEMS:  as above  The rest of the 14 points ROS reviewed and except above they are unremarkable.        PHYSICAL EXAM:  T(C): 36.4 (05-29-22 @ 04:49), Max: 36.9 (05-29-22 @ 02:25)  HR: 98 (05-29-22 @ 04:49) (81 - 117)  BP: 140/85 (05-29-22 @ 04:49) (118/76 - 156/103)  RR: 20 (05-29-22 @ 04:49) (19 - 28)  SpO2: 98% (05-29-22 @ 04:49) (92% - 100%)  Wt(kg): --  I&O's Summary    28 May 2022 07:01  -  29 May 2022 07:00  --------------------------------------------------------  IN: 0 mL / OUT: 300 mL / NET: -300 mL      JVP: Normal  Neck: supple  Lung: clear   CV: S1 S2 , Murmur:  Abd: soft  Ext: No edema  neuro: Awake / alert  Psych: flat affect  Skin: normal    LABS/DATA:    TELEMETRY: 	    ECG:  	   	  CARDIAC MARKERS:                        9 <<== 05-28-22 @ 23:55                              15.6   6.63  )-----------( 152      ( 28 May 2022 23:55 )             44.0     05-28    124<L>  |  89<L>  |  11  ----------------------------<  131<H>  4.3   |  24  |  0.86    Ca    8.7      28 May 2022 23:55  Mg     2.0     05-28    TPro  7.4  /  Alb  4.4  /  TBili  0.6  /  DBili  x   /  AST  28  /  ALT  18  /  AlkPhos  115  05-28    proBNP: Serum Pro-Brain Natriuretic Peptide: 621 pg/mL (05-28 @ 23:55)    Lipid Profile:   HgA1c:   TSH:           
[facility] NEPHROLOGY- CONSULTATION NOTE    81y Male with history of below presents with respiratory distress. Nephrology consulted for hyponatremia.    Patient with h/o hyponatremia and admits to drinking in excess of 4L daily. Patient denies any nausea, vomiting or diarrhea. Patient denies any diuretic use. Patient denies poor solute intake. Patient not on any SSRI's, anti-seizure medications or diuretics.     REVIEW OF SYSTEMS:  Gen: no changes in weight  HEENT: no rhinorrhea  Neck: no sore throat  Cards: no chest pain  Resp: + dyspnea  GI: no nausea or vomiting or diarrhea  : no dysuria or hematuria  Vascular: no LE edema  Derm: no rashes  Neuro: no numbness/tingling    pantoprazole (Rash)  penicillin (Unknown)      Home Medications Reviewed  Hospital Medications:   MEDICATIONS  (STANDING):  albuterol/ipratropium for Nebulization 3 milliLiter(s) Nebulizer every 6 hours  allopurinol 300 milliGRAM(s) Oral daily  budesonide 160 MICROgram(s)/formoterol 4.5 MICROgram(s) Inhaler 2 Puff(s) Inhalation two times a day  carvedilol 25 milliGRAM(s) Oral every 12 hours  dextrose 5%. 1000 milliLiter(s) (50 mL/Hr) IV Continuous <Continuous>  dextrose 5%. 1000 milliLiter(s) (100 mL/Hr) IV Continuous <Continuous>  dextrose 50% Injectable 25 Gram(s) IV Push once  dextrose 50% Injectable 12.5 Gram(s) IV Push once  dextrose 50% Injectable 25 Gram(s) IV Push once  glucagon  Injectable 1 milliGRAM(s) IntraMuscular once  insulin lispro (ADMELOG) corrective regimen sliding scale   SubCutaneous three times a day before meals  loratadine 10 milliGRAM(s) Oral daily  losartan 50 milliGRAM(s) Oral daily  methylPREDNISolone sodium succinate Injectable 20 milliGRAM(s) IV Push three times a day  montelukast 10 milliGRAM(s) Oral daily  sodium chloride 0.9%. 1000 milliLiter(s) (100 mL/Hr) IV Continuous <Continuous>  sodium chloride 2% . 1000 milliLiter(s) (40 mL/Hr) IV Continuous <Continuous>  tamsulosin 0.4 milliGRAM(s) Oral at bedtime  warfarin 4 milliGRAM(s) Oral once      PAST MEDICAL & SURGICAL HISTORY:  HTN (hypertension)      BPH (benign prostatic hyperplasia)      Gout      GERD (gastroesophageal reflux disease)      GI bleed      HLD (hyperlipidemia)      DM (diabetes mellitus)      AF (atrial fibrillation)      Obesity      S/P knee replacement, bilateral      S/P cataract surgery      S/P hernia repair  umbilical hernia repair           FAMILY HISTORY:  Family history of hypertension (Father)    Family history of prostate cancer in father (Father)        SOCIAL HISTORY:  Denies toxic substance use     VITALS:  T(F): 97.7 (22 @ 09:19), Max: 98.3 (22 @ 13:12)  HR: 88 (22 @ 09:19)  BP: 118/80 (22 @ 09:19)  RR: 18 (22 @ 09:19)  SpO2: 94% (22 @ 09:19)  Wt(kg): --     @ 07:01  -   @ 07:00  --------------------------------------------------------  IN: 1080 mL / OUT: 1050 mL / NET: 30 mL          PHYSICAL EXAM:  Gen: NAD, calm  HEENT: MMM  Neck: no JVD  Cards: RRR, +S1/S2, no M/G/R  Resp: + expiratory wheezing  GI: soft, NT/ND, NABS  : no CVA tenderness  Vascular: trace LLE edema > RLE edema  Derm: no rashes  Neuro: non-focal    LABS:      118<LL>  |  86<L>  |  17  ----------------------------<  179<H>  4.7   |  22  |  0.88    Ca    8.4      30 May 2022 09:06  Phos  1.9       Mg     2.3         TPro  7.5  /  Alb  4.5  /  TBili  1.0  /  DBili      /  AST  24  /  ALT  16  /  AlkPhos  102      Creatinine Trend: 0.88 <--, 0.80 <--, 0.80 <--, 0.86 <--                        14.2   13.95 )-----------( 118      ( 30 May 2022 09:06 )             39.7     Urine Studies:  Urinalysis Basic - ( 30 May 2022 10:52 )    Color: Light Yellow / Appearance: Clear / S.014 / pH:   Gluc:  / Ketone: Negative  / Bili: Negative / Urobili: Negative   Blood:  / Protein: Negative / Nitrite: Negative   Leuk Esterase: Negative / RBC:  / WBC    Sq Epi:  / Non Sq Epi:  / Bacteria:       Osmolality, Random Urine: 395 mos/kg ( @ 10:52)  Sodium, Random Urine: 12 mmol/L ( @ 10:52)  Creatinine, Random Urine: 62 mg/dL ( @ 10:52)  Protein/Creatinine Ratio Calculation: 0.1 Ratio ( @ 10:52)  Potassium, Random Urine: 24 mmol/L ( @ 10:52)  Sodium, Random Urine: 37 mmol/L ( @ 06:40)  Creatinine, Random Urine: 20 mg/dL ( @ 06:40)  Osmolality, Random Urine: 186 mos/kg ( @ 06:40)      RADIOLOGY & ADDITIONAL STUDIES:    < from: [facility] Duplex Lower Ext Vein Scan, Bilat (22 @ 15:32) >  IMPRESSION:  No evidence of deep venous thrombosis in either lower extremity.          --- End of Report ---    < end of copied text >      < from: Xray Chest 1 View- PORTABLE-Urgent (Xray Chest 1 View- PORTABLE-Urgent .) (22 @ 00:03) >  IMPRESSION:  Clear lungs.    --- End of Report ---    < end of copied text >

## 2022-05-30 NOTE — CHART NOTE - NSCHARTNOTEFT_GEN_A_CORE
Informed by RN that sodium level on AM labs was 117 with repeat level at 118. Pt seen and assessed at bedside, in no acute distress. Pt denies N/V, abd pain, HA, muscle weakness, chest pain, and SOB. He is A&O x 4 and not exhibiting signs of confusion. Spoke with Dr. Odonnell who contacted nephrology for evaluation. Ordered UA/urine lytes before hydration per Dr. Odonnell. Will f/u nephrology recs.    JOHN Valencia-C  Medicine PA  [id number]

## 2022-05-30 NOTE — PROGRESS NOTE ADULT - ASSESSMENT
SOB/ Wheezing   asthma exacerbation   nebs  on steroids   plan as per med    Elevated proBNP   ? from right sided elevated pressures in setting of asthma  obtain echo   recommend renal eval for hyponatremia , if work up consistent with hypervolemic hyponatremia then will need diuresis     long standing afib  HR stable  cont BB  cont a/c , pt was advised in the past to change to NOAC , he is reluctant   cont warfarin     HTN  cont current meds

## 2022-05-30 NOTE — PROGRESS NOTE ADULT - SUBJECTIVE AND OBJECTIVE BOX
DATE OF SERVICE: 05-30-22 @ 07:51    Subjective: Patient seen and examined. No new events except as noted.     SUBJECTIVE/ROS:  has sob       MEDICATIONS:  MEDICATIONS  (STANDING):  albuterol/ipratropium for Nebulization 3 milliLiter(s) Nebulizer every 6 hours  allopurinol 300 milliGRAM(s) Oral daily  budesonide 160 MICROgram(s)/formoterol 4.5 MICROgram(s) Inhaler 2 Puff(s) Inhalation two times a day  carvedilol 25 milliGRAM(s) Oral every 12 hours  loratadine 10 milliGRAM(s) Oral daily  losartan 50 milliGRAM(s) Oral daily  methylPREDNISolone sodium succinate Injectable 20 milliGRAM(s) IV Push three times a day  montelukast 10 milliGRAM(s) Oral daily  sodium chloride 0.9%. 1000 milliLiter(s) (100 mL/Hr) IV Continuous <Continuous>  tamsulosin 0.4 milliGRAM(s) Oral at bedtime      PHYSICAL EXAM:  T(C): 36.3 (05-30-22 @ 04:54), Max: 37.2 (05-29-22 @ 09:50)  HR: 101 (05-30-22 @ 04:54) (83 - 101)  BP: 129/89 (05-30-22 @ 04:54) (108/72 - 139/85)  RR: 18 (05-30-22 @ 04:54) (18 - 20)  SpO2: 97% (05-30-22 @ 04:54) (92% - 97%)  Wt(kg): --  I&O's Summary    29 May 2022 07:01  -  30 May 2022 07:00  --------------------------------------------------------  IN: 1080 mL / OUT: 1050 mL / NET: 30 mL            JVP: Normal  Neck: supple  Lung: clear   CV: S1 S2 , Murmur:  Abd: soft  Ext: No edema  neuro: Awake / alert  Psych: flat affect  Skin: normal``    LABS/DATA:    CARDIAC MARKERS:                                16.0   7.52  )-----------( 146      ( 29 May 2022 07:15 )             44.8     05-30    117<LL>  |  84<L>  |  19  ----------------------------<  158<H>  4.6   |  21<L>  |  0.80    Ca    8.7      30 May 2022 07:00  Phos  1.9     05-29  Mg     2.3     05-29    TPro  7.5  /  Alb  4.5  /  TBili  1.0  /  DBili  x   /  AST  24  /  ALT  16  /  AlkPhos  102  05-29    proBNP:   Lipid Profile:   HgA1c:   TSH:     TELE:  EKG:

## 2022-05-30 NOTE — PROGRESS NOTE ADULT - ASSESSMENT
Patient is an 81 year old Male with a PMHx of HTN, DM2, asthma, BPH, Afib on coumadin, right RCC s/p nephrectomy, pw respiratory distress 2/2 asthma exacerbation, incidental hyponatremia    Acute asthma exacerbation.   - Cont inhaled steroids, duoneb, solumedrol 20 IV tid  - C/w loratadine  - Monitor O2 sat; supplement O2 PRN   - Not complaints of SOB or dyspnea at time of visit 05/30    Hyponatremia  - Na downtrending to 117 this AM  - Renal eval called; F/U recs  - F/U on urine lytes  - C/w free water restriction, urine lytes.    PAF (paroxysmal atrial fibrillation)  - Cont coumadin  - C/w Coreg 25 BID   - F/U cardio recs  - F/U TTE results     DM  - A1C of 6.4   - Diabetic diet   - Placed on sliding scale   - Pt will require outpatient follow up upon discharge     HTN (hypertension).   - C/w Coreg 25 BID   - Cardio follow up appreciated; F/U recs    BPH  - C/w tamsulosin   - Monitor I and O     PPX  - Coumadin      Patient's Son Ethan called and updated. All questions have been answered.           Patient is an 81 year old Male with a PMHx of HTN, DM2, asthma, BPH, Afib on coumadin, right RCC s/p nephrectomy, pw respiratory distress 2/2 asthma exacerbation, incidental hyponatremia    Acute asthma exacerbation.   - Cont inhaled steroids, duoneb, solumedrol 20 IV tid  - C/w loratadine  - Monitor O2 sat; supplement O2 PRN   - Not complaints of SOB or dyspnea at time of visit 05/30    Hyponatremia  - Na downtrending to 117 this AM  - Renal eval called; F/U recs  - F/U on urine lytes  - C/w free water restriction, urine lytes.    PAF (paroxysmal atrial fibrillation)  - Cont coumadin  - C/w Coreg 25 BID   - F/U cardio recs  - F/U TTE results     DM  - A1C of 6.4   - Diabetic diet   - Placed on sliding scale   - Pt will require outpatient follow up upon discharge     Leukocytosis   - Likely reactive to steroids   - Monitor CBC, Temp curve, VS, and patient closely   - If febrile, check pan Cx     HTN (hypertension).   - C/w Coreg 25 BID   - Cardio follow up appreciated; F/U recs    BPH  - C/w tamsulosin   - Monitor I and O     PPX  - Coumadin      Patient's Son Ethan called and updated. All questions have been answered.

## 2022-05-30 NOTE — PROGRESS NOTE ADULT - SUBJECTIVE AND OBJECTIVE BOX
Name of Patient : STEFAN DURAND  MRN: [id number]  Date of visit: 22 @ 11:39      Subjective: Patient seen and examined. No new events except as noted.   Patient reports his SOB and breathing has improved. NS pacific  offered, however patient declined .   Patient denies CP, palpitations, SOB, Dyspnea, abdominal pain or discomfort, headache, dizziness, lightheadedness, nausea or vomiting.   Hyponatremic this AM to 117.     REVIEW OF SYSTEMS:    CONSTITUTIONAL: No weakness, fevers or chills  EYES/ENT: No visual changes;  No vertigo or throat pain   NECK: No pain or stiffness  RESPIRATORY: No cough, wheezing, hemoptysis; No shortness of breath  CARDIOVASCULAR: No chest pain or palpitations  GASTROINTESTINAL: No abdominal or epigastric pain. No nausea, vomiting, or hematemesis; No diarrhea or constipation. No melena or hematochezia.  GENITOURINARY: No dysuria, frequency or hematuria  NEUROLOGICAL: No numbness or weakness  SKIN: No itching, burning, rashes, or lesions   All other review of systems is negative unless indicated above.    MEDICATIONS:  MEDICATIONS  (STANDING):  albuterol/ipratropium for Nebulization 3 milliLiter(s) Nebulizer every 6 hours  allopurinol 300 milliGRAM(s) Oral daily  budesonide 160 MICROgram(s)/formoterol 4.5 MICROgram(s) Inhaler 2 Puff(s) Inhalation two times a day  carvedilol 25 milliGRAM(s) Oral every 12 hours  loratadine 10 milliGRAM(s) Oral daily  losartan 50 milliGRAM(s) Oral daily  methylPREDNISolone sodium succinate Injectable 20 milliGRAM(s) IV Push three times a day  montelukast 10 milliGRAM(s) Oral daily  sodium chloride 0.9%. 1000 milliLiter(s) (100 mL/Hr) IV Continuous <Continuous>  tamsulosin 0.4 milliGRAM(s) Oral at bedtime  warfarin 4 milliGRAM(s) Oral once      PHYSICAL EXAM:  T(C): 36.5 (22 @ 09:19), Max: 36.8 (22 @ 13:12)  HR: 88 (22 @ 09:19) (83 - 101)  BP: 118/80 (22 @ 09:19) (108/72 - 139/85)  RR: 18 (22 @ 09:19) (18 - 18)  SpO2: 94% (22 @ 09:19) (93% - 97%)  Wt(kg): --  I&O's Summary    29 May 2022 07:01  -  30 May 2022 07:00  --------------------------------------------------------  IN: 1080 mL / OUT: 1050 mL / NET: 30 mL          Appearance: Normal	  HEENT:  PERRLA   Lymphatic: No lymphadenopathy   Cardiovascular: Normal S1 S2, no JVD  Respiratory: normal effort , clear  Gastrointestinal:  Soft, Non-tender  Skin: No rashes,  warm to touch  Psychiatry:  Mood & affect appropriate  Musculoskeletal: No edema          22 @ 07:01  -  22 @ 07:00  --------------------------------------------------------  IN: 1080 mL / OUT: 1050 mL / NET: 30 mL                            14.2   13.95 )-----------( 118      ( 30 May 2022 09:06 )             39.7               05-30    118<LL>  |  86<L>  |  17  ----------------------------<  179<H>  4.7   |  22  |  0.88    Ca    8.4      30 May 2022 09:06  Phos  1.9       Mg     2.3         TPro  7.5  /  Alb  4.5  /  TBili  1.0  /  DBili  x   /  AST  24  /  ALT  16  /  AlkPhos  102  29    PT/INR - ( 30 May 2022 09:06 )   PT: 28.9 sec;   INR: 2.49 ratio         PTT - ( 29 May 2022 07:15 )  PTT:49.7 sec                   Urinalysis Basic - ( 30 May 2022 10:52 )    Color: Light Yellow / Appearance: Clear / S.014 / pH: x  Gluc: x / Ketone: Negative  / Bili: Negative / Urobili: Negative   Blood: x / Protein: Negative / Nitrite: Negative   Leuk Esterase: Negative / RBC: x / WBC x   Sq Epi: x / Non Sq Epi: x / Bacteria: x          < from: [facility] Duplex Lower Ext Vein Scan, Bilat (22 @ 15:32) >  IMPRESSION:  No evidence of deep venous thrombosis in either lower extremity.        < end of copied text >     Name of Patient : STEFAN DURAND  MRN: [id number]  Date of visit: 22 @ 11:39      Subjective: Patient seen and examined. No new events except as noted.   Patient reports his SOB and breathing has improved. NS pacific  offered, however patient declined .   Patient denies CP, palpitations, SOB, Dyspnea, abdominal pain or discomfort, headache, dizziness, lightheadedness, nausea or vomiting.   Hyponatremic this AM to 117.     REVIEW OF SYSTEMS:    CONSTITUTIONAL: No weakness, fevers or chills  EYES/ENT: No visual changes;  No vertigo or throat pain   NECK: No pain or stiffness  RESPIRATORY: No cough, wheezing, hemoptysis; No shortness of breath  CARDIOVASCULAR: No chest pain or palpitations  GASTROINTESTINAL: No abdominal or epigastric pain. No nausea, vomiting, or hematemesis; No diarrhea or constipation. No melena or hematochezia.  GENITOURINARY: No dysuria, frequency or hematuria  NEUROLOGICAL: No numbness or weakness  SKIN: No itching, burning, rashes, or lesions   All other review of systems is negative unless indicated above.    MEDICATIONS:  MEDICATIONS  (STANDING):  albuterol/ipratropium for Nebulization 3 milliLiter(s) Nebulizer every 6 hours  allopurinol 300 milliGRAM(s) Oral daily  budesonide 160 MICROgram(s)/formoterol 4.5 MICROgram(s) Inhaler 2 Puff(s) Inhalation two times a day  carvedilol 25 milliGRAM(s) Oral every 12 hours  loratadine 10 milliGRAM(s) Oral daily  losartan 50 milliGRAM(s) Oral daily  methylPREDNISolone sodium succinate Injectable 20 milliGRAM(s) IV Push three times a day  montelukast 10 milliGRAM(s) Oral daily  sodium chloride 0.9%. 1000 milliLiter(s) (100 mL/Hr) IV Continuous <Continuous>  tamsulosin 0.4 milliGRAM(s) Oral at bedtime  warfarin 4 milliGRAM(s) Oral once      PHYSICAL EXAM:  T(C): 36.5 (22 @ 09:19), Max: 36.8 (22 @ 13:12)  HR: 88 (22 @ 09:19) (83 - 101)  BP: 118/80 (22 @ 09:19) (108/72 - 139/85)  RR: 18 (22 @ 09:19) (18 - 18)  SpO2: 94% (22 @ 09:19) (93% - 97%)  Wt(kg): --  I&O's Summary    29 May 2022 07:01  -  30 May 2022 07:00  --------------------------------------------------------  IN: 1080 mL / OUT: 1050 mL / NET: 30 mL          Appearance: Normal	  HEENT:  PERRLA   Lymphatic: No lymphadenopathy   Cardiovascular: Normal S1 S2, no JVD  Respiratory: +Wheezing at bases  Gastrointestinal:  Soft, Non-tender  Skin: No rashes,  warm to touch  Psychiatry:  Mood & affect appropriate  Musculoskeletal: No edema          22 @ 07:01  -  22 @ 07:00  --------------------------------------------------------  IN: 1080 mL / OUT: 1050 mL / NET: 30 mL                            14.2   13.95 )-----------( 118      ( 30 May 2022 09:06 )             39.7               05-30    118<LL>  |  86<L>  |  17  ----------------------------<  179<H>  4.7   |  22  |  0.88    Ca    8.4      30 May 2022 09:06  Phos  1.9       Mg     2.3         TPro  7.5  /  Alb  4.5  /  TBili  1.0  /  DBili  x   /  AST  24  /  ALT  16  /  AlkPhos  102  29    PT/INR - ( 30 May 2022 09:06 )   PT: 28.9 sec;   INR: 2.49 ratio         PTT - ( 29 May 2022 07:15 )  PTT:49.7 sec                   Urinalysis Basic - ( 30 May 2022 10:52 )    Color: Light Yellow / Appearance: Clear / S.014 / pH: x  Gluc: x / Ketone: Negative  / Bili: Negative / Urobili: Negative   Blood: x / Protein: Negative / Nitrite: Negative   Leuk Esterase: Negative / RBC: x / WBC x   Sq Epi: x / Non Sq Epi: x / Bacteria: x          < from: [facility] Duplex Lower Ext Vein Scan, Bilat (22 @ 15:32) >  IMPRESSION:  No evidence of deep venous thrombosis in either lower extremity.        < end of copied text >

## 2022-05-30 NOTE — CONSULT NOTE ADULT - ASSESSMENT
SOB/ Wheezing   asthma exacerbation   nebs  on steroids   plan as per med    Elevated proBNP   ? from right sided elevated pressures in setting of asthma  obtain echo     long standing afib  HR stable  cont BB  cont a/c , pt was advised in the past to change to NOAC , he is reluctant   cont warfarin     HTN  cont current meds 
81y Male with history of HTN, DM, R RCC s/p nephrectomy presents with respiratory distress. Nephrology consulted for hyponatremia.    1) Hyponatremia: Initially due to polydipsia given low urine osm and excessive fluid intake PTA with acute decrease in serum Na this morning after patient on NS overnight. Urine studies however consistent with decreased EABV. Will treat with Urea 30 grams PO twice daily rather than hypertonic IVF at this time given unclear if patient with underlying HF (TTE pending). Repeat BMP this evening. Continue with 1L FR. Check serum osm, serum uric acid, TSH and repeat urine studies in AM. Monitor serum Na and do not correct more than 8 meQ/day.    2) HTN: BP controlled. Continue with current medications. Monitor BP.    3) LE edema: Mild and likely due to steroids. Defer diuretics for now. F/U TTE. Monitor UO.    4) Asthma exacerbation: On IV steroids. As per primary team.      [facility] NEPHROLOGY  Lj Ahumada M.D.  Robbie Fitch D.O.  Hansa Emmanuel M.D.  Vidhya Morales, MATTHEW, ANP-C    Telephone: [phone]  Facsimile: [phone]    [address] [place] [address]

## 2022-05-31 LAB
ANION GAP SERPL CALC-SCNC: 10 MMOL/L — SIGNIFICANT CHANGE UP (ref 5–17)
ANION GAP SERPL CALC-SCNC: 14 MMOL/L — SIGNIFICANT CHANGE UP (ref 5–17)
BUN SERPL-MCNC: 30 MG/DL — HIGH (ref 7–23)
BUN SERPL-MCNC: 47 MG/DL — HIGH (ref 7–23)
CALCIUM SERPL-MCNC: 8.8 MG/DL — SIGNIFICANT CHANGE UP (ref 8.4–10.5)
CALCIUM SERPL-MCNC: 8.9 MG/DL — SIGNIFICANT CHANGE UP (ref 8.4–10.5)
CHLORIDE SERPL-SCNC: 88 MMOL/L — LOW (ref 96–108)
CHLORIDE SERPL-SCNC: 90 MMOL/L — LOW (ref 96–108)
CO2 SERPL-SCNC: 22 MMOL/L — SIGNIFICANT CHANGE UP (ref 22–31)
CO2 SERPL-SCNC: 25 MMOL/L — SIGNIFICANT CHANGE UP (ref 22–31)
CREAT SERPL-MCNC: 0.94 MG/DL — SIGNIFICANT CHANGE UP (ref 0.5–1.3)
CREAT SERPL-MCNC: 1.18 MG/DL — SIGNIFICANT CHANGE UP (ref 0.5–1.3)
EGFR: 62 ML/MIN/1.73M2 — SIGNIFICANT CHANGE UP
EGFR: 81 ML/MIN/1.73M2 — SIGNIFICANT CHANGE UP
GLUCOSE SERPL-MCNC: 164 MG/DL — HIGH (ref 70–99)
GLUCOSE SERPL-MCNC: 200 MG/DL — HIGH (ref 70–99)
HCT VFR BLD CALC: 41.8 % — SIGNIFICANT CHANGE UP (ref 39–50)
HGB BLD-MCNC: 14.7 G/DL — SIGNIFICANT CHANGE UP (ref 13–17)
INR BLD: 2.81 RATIO — HIGH (ref 0.88–1.16)
MCHC RBC-ENTMCNC: 33.1 PG — SIGNIFICANT CHANGE UP (ref 27–34)
MCHC RBC-ENTMCNC: 35.2 GM/DL — SIGNIFICANT CHANGE UP (ref 32–36)
MCV RBC AUTO: 94.1 FL — SIGNIFICANT CHANGE UP (ref 80–100)
NRBC # BLD: 0 /100 WBCS — SIGNIFICANT CHANGE UP (ref 0–0)
OSMOLALITY SERPL: 276 MOSMOL/KG — LOW (ref 280–301)
OSMOLALITY UR: 405 MOS/KG — SIGNIFICANT CHANGE UP (ref 300–900)
PHOSPHATE SERPL-MCNC: 2.6 MG/DL — SIGNIFICANT CHANGE UP (ref 2.5–4.5)
PLATELET # BLD AUTO: 128 K/UL — LOW (ref 150–400)
POTASSIUM SERPL-MCNC: 4.8 MMOL/L — SIGNIFICANT CHANGE UP (ref 3.5–5.3)
POTASSIUM SERPL-MCNC: 5.1 MMOL/L — SIGNIFICANT CHANGE UP (ref 3.5–5.3)
POTASSIUM SERPL-SCNC: 4.8 MMOL/L — SIGNIFICANT CHANGE UP (ref 3.5–5.3)
POTASSIUM SERPL-SCNC: 5.1 MMOL/L — SIGNIFICANT CHANGE UP (ref 3.5–5.3)
PROTHROM AB SERPL-ACNC: 32.9 SEC — HIGH (ref 10.5–13.4)
RBC # BLD: 4.44 M/UL — SIGNIFICANT CHANGE UP (ref 4.2–5.8)
RBC # FLD: 11.9 % — SIGNIFICANT CHANGE UP (ref 10.3–14.5)
SODIUM SERPL-SCNC: 124 MMOL/L — LOW (ref 135–145)
SODIUM SERPL-SCNC: 125 MMOL/L — LOW (ref 135–145)
SODIUM UR-SCNC: 43 MMOL/L — SIGNIFICANT CHANGE UP
TSH SERPL-MCNC: 0.42 UIU/ML — SIGNIFICANT CHANGE UP (ref 0.27–4.2)
URATE SERPL-MCNC: 2.7 MG/DL — LOW (ref 3.4–8.8)
WBC # BLD: 12.68 K/UL — HIGH (ref 3.8–10.5)
WBC # FLD AUTO: 12.68 K/UL — HIGH (ref 3.8–10.5)

## 2022-05-31 RX ORDER — WARFARIN SODIUM 2.5 MG/1
4 TABLET ORAL ONCE
Refills: 0 | Status: COMPLETED | OUTPATIENT
Start: 2022-05-31 | End: 2022-05-31

## 2022-05-31 RX ORDER — UREA 15 G
15 POWDER IN PACKET (EA) ORAL
Refills: 0 | Status: DISCONTINUED | OUTPATIENT
Start: 2022-06-01 | End: 2022-06-01

## 2022-05-31 RX ADMIN — Medication 3 MILLILITER(S): at 13:05

## 2022-05-31 RX ADMIN — Medication 3 MILLILITER(S): at 06:15

## 2022-05-31 RX ADMIN — LORATADINE 10 MILLIGRAM(S): 10 TABLET ORAL at 13:04

## 2022-05-31 RX ADMIN — Medication 300 MILLIGRAM(S): at 13:04

## 2022-05-31 RX ADMIN — Medication 200 MILLIGRAM(S): at 13:03

## 2022-05-31 RX ADMIN — BENZOCAINE AND MENTHOL 1 LOZENGE: 5; 1 LIQUID ORAL at 13:03

## 2022-05-31 RX ADMIN — Medication 30 GRAM(S): at 06:16

## 2022-05-31 RX ADMIN — Medication 20 MILLIGRAM(S): at 06:15

## 2022-05-31 RX ADMIN — Medication 1: at 17:41

## 2022-05-31 RX ADMIN — Medication 650 MILLIGRAM(S): at 20:23

## 2022-05-31 RX ADMIN — BUDESONIDE AND FORMOTEROL FUMARATE DIHYDRATE 2 PUFF(S): 160; 4.5 AEROSOL RESPIRATORY (INHALATION) at 06:15

## 2022-05-31 RX ADMIN — LOSARTAN POTASSIUM 50 MILLIGRAM(S): 100 TABLET, FILM COATED ORAL at 06:15

## 2022-05-31 RX ADMIN — Medication 20 MILLIGRAM(S): at 17:40

## 2022-05-31 RX ADMIN — Medication 3 MILLILITER(S): at 17:38

## 2022-05-31 RX ADMIN — CARVEDILOL PHOSPHATE 25 MILLIGRAM(S): 80 CAPSULE, EXTENDED RELEASE ORAL at 17:41

## 2022-05-31 RX ADMIN — WARFARIN SODIUM 4 MILLIGRAM(S): 2.5 TABLET ORAL at 23:46

## 2022-05-31 RX ADMIN — CARVEDILOL PHOSPHATE 25 MILLIGRAM(S): 80 CAPSULE, EXTENDED RELEASE ORAL at 06:15

## 2022-05-31 RX ADMIN — BUDESONIDE AND FORMOTEROL FUMARATE DIHYDRATE 2 PUFF(S): 160; 4.5 AEROSOL RESPIRATORY (INHALATION) at 17:39

## 2022-05-31 RX ADMIN — MONTELUKAST 10 MILLIGRAM(S): 4 TABLET, CHEWABLE ORAL at 13:04

## 2022-05-31 RX ADMIN — TAMSULOSIN HYDROCHLORIDE 0.4 MILLIGRAM(S): 0.4 CAPSULE ORAL at 23:46

## 2022-05-31 RX ADMIN — Medication 3 MILLILITER(S): at 23:46

## 2022-05-31 NOTE — PROGRESS NOTE ADULT - SUBJECTIVE AND OBJECTIVE BOX
DATE OF SERVICE: 05-31-22 @ 07:54    Subjective: Patient seen and examined. No new events except as noted.     SUBJECTIVE/ROS:  feels ok     MEDICATIONS:  MEDICATIONS  (STANDING):  albuterol/ipratropium for Nebulization 3 milliLiter(s) Nebulizer every 6 hours  allopurinol 300 milliGRAM(s) Oral daily  budesonide 160 MICROgram(s)/formoterol 4.5 MICROgram(s) Inhaler 2 Puff(s) Inhalation two times a day  carvedilol 25 milliGRAM(s) Oral every 12 hours  dextrose 5%. 1000 milliLiter(s) (50 mL/Hr) IV Continuous <Continuous>  dextrose 5%. 1000 milliLiter(s) (100 mL/Hr) IV Continuous <Continuous>  dextrose 50% Injectable 25 Gram(s) IV Push once  dextrose 50% Injectable 12.5 Gram(s) IV Push once  dextrose 50% Injectable 25 Gram(s) IV Push once  glucagon  Injectable 1 milliGRAM(s) IntraMuscular once  insulin lispro (ADMELOG) corrective regimen sliding scale   SubCutaneous three times a day before meals  loratadine 10 milliGRAM(s) Oral daily  losartan 50 milliGRAM(s) Oral daily  methylPREDNISolone sodium succinate Injectable 20 milliGRAM(s) IV Push three times a day  montelukast 10 milliGRAM(s) Oral daily  tamsulosin 0.4 milliGRAM(s) Oral at bedtime  urea Oral Powder 30 Gram(s) Oral two times a day      PHYSICAL EXAM:  T(C): 36.6 (05-31-22 @ 06:08), Max: 36.8 (05-30-22 @ 13:15)  HR: 100 (05-31-22 @ 06:08) (88 - 100)  BP: 129/92 (05-31-22 @ 06:08) (115/73 - 129/92)  RR: 18 (05-31-22 @ 06:08) (18 - 18)  SpO2: 95% (05-31-22 @ 06:08) (93% - 95%)  Wt(kg): --  I&O's Summary    30 May 2022 07:01  -  31 May 2022 07:00  --------------------------------------------------------  IN: 1180 mL / OUT: 900 mL / NET: 280 mL            JVP: Normal  Neck: supple  Lung: clear   CV: S1 S2 , Murmur:  Abd: soft  Ext: No edema  neuro: Awake / alert  Psych: flat affect  Skin: normal``    LABS/DATA:    CARDIAC MARKERS:                                14.2   13.95 )-----------( 118      ( 30 May 2022 09:06 )             39.7     05-30    117<LL>  |  84<L>  |  45<H>  ----------------------------<  165<H>  5.2   |  21<L>  |  1.08    Ca    8.3<L>      30 May 2022 21:42      proBNP:   Lipid Profile:   HgA1c:   TSH:     TELE:  EKG:

## 2022-05-31 NOTE — PROGRESS NOTE ADULT - SUBJECTIVE AND OBJECTIVE BOX
Name of Patient : STEFAN DURAND  MRN: [id number]  Date of visit: 22 @ 12:30      Subjective: Patient seen and examined. No new events except as noted.   Patient seen this morning. Reports his breathing and SOB have improved.  Denies CP, palpitations, SOB or dyspnea. Denies abdominal pain or discomfort.   Denies N/V.   Sodium improving.     REVIEW OF SYSTEMS:    CONSTITUTIONAL: No weakness, fevers or chills  EYES/ENT: No visual changes;  No vertigo or throat pain   NECK: No pain or stiffness  RESPIRATORY: No cough, wheezing, hemoptysis; No shortness of breath  CARDIOVASCULAR: No chest pain or palpitations  GASTROINTESTINAL: No abdominal or epigastric pain. No nausea, vomiting, or hematemesis; No diarrhea or constipation. No melena or hematochezia.  GENITOURINARY: No dysuria, frequency or hematuria  NEUROLOGICAL: No numbness or weakness  SKIN: No itching, burning, rashes, or lesions   All other review of systems is negative unless indicated above.    MEDICATIONS:  MEDICATIONS  (STANDING):  albuterol/ipratropium for Nebulization 3 milliLiter(s) Nebulizer every 6 hours  allopurinol 300 milliGRAM(s) Oral daily  budesonide 160 MICROgram(s)/formoterol 4.5 MICROgram(s) Inhaler 2 Puff(s) Inhalation two times a day  carvedilol 25 milliGRAM(s) Oral every 12 hours  dextrose 5%. 1000 milliLiter(s) (50 mL/Hr) IV Continuous <Continuous>  dextrose 5%. 1000 milliLiter(s) (100 mL/Hr) IV Continuous <Continuous>  dextrose 50% Injectable 25 Gram(s) IV Push once  dextrose 50% Injectable 12.5 Gram(s) IV Push once  dextrose 50% Injectable 25 Gram(s) IV Push once  glucagon  Injectable 1 milliGRAM(s) IntraMuscular once  insulin lispro (ADMELOG) corrective regimen sliding scale   SubCutaneous three times a day before meals  loratadine 10 milliGRAM(s) Oral daily  losartan 50 milliGRAM(s) Oral daily  methylPREDNISolone sodium succinate Injectable 20 milliGRAM(s) IV Push every 12 hours  montelukast 10 milliGRAM(s) Oral daily  tamsulosin 0.4 milliGRAM(s) Oral at bedtime  urea Oral Powder 30 Gram(s) Oral two times a day      PHYSICAL EXAM:  T(C): 36.7 (05-31-22 @ 09:51), Max: 36.8 (22 @ 13:15)  HR: 90 (22 @ 09:51) (88 - 100)  BP: 119/91 (22 @ 09:51) (115/73 - 129/92)  RR: 18 (22 @ 06:08) (18 - 18)  SpO2: 92% (22 @ 09:51) (92% - 95%)  Wt(kg): --  I&O's Summary    30 May 2022 07:  -  31 May 2022 07:00  --------------------------------------------------------  IN: 1180 mL / OUT: 900 mL / NET: 280 mL    31 May 2022 07:  -  31 May 2022 12:30  --------------------------------------------------------  IN: 400 mL / OUT: 0 mL / NET: 400 mL          Appearance: Normal	  HEENT:  PERRLA   Lymphatic: No lymphadenopathy   Cardiovascular: Normal S1 S2, no JVD  Respiratory: normal effort , clear  Gastrointestinal:  Soft, Non-tender  Skin: No rashes,  warm to touch  Psychiatry:  Mood & affect appropriate  Musculoskeletal: No edema      22 @ 07:  -  22 @ 07:00  --------------------------------------------------------  IN: 1180 mL / OUT: 900 mL / NET: 280 mL    22 @ 07:01  -  22 @ 12:30  --------------------------------------------------------  IN: 400 mL / OUT: 0 mL / NET: 400 mL                                  14.7   12.68 )-----------( 128      ( 31 May 2022 07:39 )             41.8                   124<L>  |  88<L>  |  30<H>  ----------------------------<  200<H>  4.8   |  22  |  0.94    Ca    8.8      31 May 2022 07:37  Phos  2.6           PT/INR - ( 31 May 2022 07:40 )   PT: 32.9 sec;   INR: 2.81 ratio                            Urinalysis Basic - ( 30 May 2022 10:52 )    Color: Light Yellow / Appearance: Clear / S.014 / pH: x  Gluc: x / Ketone: Negative  / Bili: Negative / Urobili: Negative   Blood: x / Protein: Negative / Nitrite: Negative   Leuk Esterase: Negative / RBC: x / WBC x   Sq Epi: x / Non Sq Epi: x / Bacteria: x          < from: TTE with Doppler (w/Cont) (22 @ 08:23) >  Conclusions:  1. Increased relative wall thickness with normal left  ventricular mass index, consistent with concentric left  ventricular remodeling.  2. Normal left ventricular systolic function.   Endocardial  visualization enhanced with intravenous injection of  Ultrasonic Enhancing Agent (Definity).  3. The right ventricle is not well visualized; grossly  normal right ventricular systolic function.  *** No previous Echo exam.  ------------------------------------------------------------------------  Confirmed on  2022 - 16:24:12 by ASHLEY Pederson  ----------------------------------------------------    < end of copied text >

## 2022-05-31 NOTE — PROGRESS NOTE ADULT - ASSESSMENT
SOB/ Wheezing   asthma exacerbation   nebs  on steroids   plan as per med    Elevated proBNP   ? from right sided elevated pressures in setting of asthma  echo shows LVH , normal LV function   will hold off to diuresis given low NA unless renal believes it would help    long standing afib  HR stable  cont BB  cont a/c , pt was advised in the past to change to NOAC , he is reluctant   cont warfarin     HTN  cont current meds

## 2022-05-31 NOTE — PROGRESS NOTE ADULT - SUBJECTIVE AND OBJECTIVE BOX
[facility] NEPHROLOGY- PROGRESS NOTE    81y Male with history of HTN, DM, R RCC s/p nephrectomy presents with respiratory distress. Nephrology consulted for hyponatremia.    REVIEW OF SYSTEMS:  Gen: no changes in weight  Cards: no chest pain  Resp: no dyspnea, + cough  GI: no nausea or vomiting or diarrhea  Vascular: no LE edema    pantoprazole (Rash)  penicillin (Unknown)      Hospital Medications: Medications reviewed    VITALS:  T(F): 98.1 (22 @ 09:51), Max: 98.3 (22 @ 01:11)  HR: 90 (22 @ 09:51)  BP: 119/91 (22 @ 09:51)  RR: 18 (22 @ 06:08)  SpO2: 92% (22 @ 09:51)  Wt(kg): --  Height (cm): 170.2 ( 23:42)  Weight (kg): 127 ( 23:42)  BMI (kg/m2): 43.8 ( 23:42)  BSA (m2): 2.33 ( 23:42)     @ 07:01  -   @ 07:00  --------------------------------------------------------  IN: 1180 mL / OUT: 900 mL / NET: 280 mL     @ 07:01  -   @ 12:47  --------------------------------------------------------  IN: 400 mL / OUT: 0 mL / NET: 400 mL        PHYSICAL EXAM:    Gen: NAD, calm  Cards: RRR, +S1/S2, no M/G/R  Resp: CTA B/L  GI: soft, NT/ND, NABS  Vascular: trace LLE edema > RLE edema    LABS:      124<L>  |  88<L>  |  30<H>  ----------------------------<  200<H>  4.8   |  22  |  0.94    Ca    8.8      31 May 2022 07:37  Phos  2.6           Creatinine Trend: 0.94 <--, 1.08 <--, 0.88 <--, 0.80 <--, 0.80 <--, 0.86 <--                        14.7   12.68 )-----------( 128      ( 31 May 2022 07:39 )             41.8     Urine Studies:  Urinalysis Basic - ( 30 May 2022 10:52 )    Color: Light Yellow / Appearance: Clear / S.014 / pH:   Gluc:  / Ketone: Negative  / Bili: Negative / Urobili: Negative   Blood:  / Protein: Negative / Nitrite: Negative   Leuk Esterase: Negative / RBC:  / WBC    Sq Epi:  / Non Sq Epi:  / Bacteria:       Osmolality, Random Urine: 395 mos/kg ( @ 10:52)  Sodium, Random Urine: 12 mmol/L ( @ 10:52)  Creatinine, Random Urine: 62 mg/dL ( @ 10:52)  Protein/Creatinine Ratio Calculation: 0.1 Ratio ( @ 10:52)  Potassium, Random Urine: 24 mmol/L ( @ 10:52)  Sodium, Random Urine: 37 mmol/L ( @ 06:40)  Creatinine, Random Urine: 20 mg/dL ( @ 06:40)  Osmolality, Random Urine: 186 mos/kg ( @ 06:40)      RADIOLOGY & ADDITIONAL STUDIES:

## 2022-05-31 NOTE — PROGRESS NOTE ADULT - ASSESSMENT
Patient is an 81 year old Male with a PMHx of HTN, DM2, asthma, BPH, Afib on coumadin, right RCC s/p nephrectomy, pw respiratory distress 2/2 asthma exacerbation, incidental hyponatremia    Acute asthma exacerbation.   - Cont inhaled steroids, duoneb, solumedrol 20 IV decreased to BID; Monitor closely   - C/w loratadine  - Monitor O2 sat; supplement O2 PRN   - Not complaints of SOB or dyspnea at time of visit 05/31    Hyponatremia  - Na improving   - Renal eval called; F/U recs  - F/U on urine lytes  - C/w free water restriction, urine lytes.   - Avoid overcorrection     PAF (paroxysmal atrial fibrillation)  - Cont coumadin  - C/w Coreg 25 BID   - F/U cardio recs  - TTE as above- LVH, Normal LV systolic function; F/u cardio recs     DM  - A1C of 6.4   - Diabetic diet   - Placed on sliding scale   - Pt will require outpatient follow up upon discharge     Leukocytosis   - Likely reactive to steroids   - Monitor CBC, Temp curve, VS, and patient closely   - If febrile, check pan Cx     HTN (hypertension).   - C/w Coreg 25 BID   - Cardio follow up appreciated; F/U recs    BPH  - C/w tamsulosin   - Monitor I and O     PPX  - Coumadin                Patient is an 81 year old Male with a PMHx of HTN, DM2, asthma, BPH, Afib on coumadin, right RCC s/p nephrectomy, pw respiratory distress 2/2 asthma exacerbation, incidental hyponatremia    Acute asthma exacerbation.   - Cont inhaled steroids, duoneb, solumedrol 20 IV decreased to BID; Monitor closely   - C/w loratadine  - Monitor O2 sat; supplement O2 PRN   - Not complaints of SOB or dyspnea at time of visit 05/31    Hyponatremia  - Na improving   - Renal eval called; F/U recs  - F/U on urine lytes  - C/w free water restriction, urine lytes.   - Avoid overcorrection     PAF (paroxysmal atrial fibrillation)  - Cont coumadin - Coumadin 4 ordered for tonight 05/31  - C/w Coreg 25 BID   - F/U cardio recs  - TTE as above- LVH, Normal LV systolic function; F/u cardio recs     DM  - A1C of 6.4   - Diabetic diet   - Placed on sliding scale   - Pt will require outpatient follow up upon discharge     Leukocytosis   - Likely reactive to steroids   - Monitor CBC, Temp curve, VS, and patient closely   - If febrile, check pan Cx     HTN (hypertension).   - C/w Coreg 25 BID   - Cardio follow up appreciated; F/U recs    BPH  - C/w tamsulosin   - Monitor I and O     PPX  - Coumadin

## 2022-05-31 NOTE — PROGRESS NOTE ADULT - ASSESSMENT
81y Male with history of HTN, DM, R RCC s/p nephrectomy presents with respiratory distress. Nephrology consulted for hyponatremia.    1) Hyponatremia: Hypotonic hyponatremia with urine studies consistent with decreased EABV. Serum Na improving. Hold Urea 30 grams PO twice daily given rapid correction (7 meQ in 10 hours) and repeat urine studies today. Continue with 1L FR. Monitor serum Na.    2) HTN: BP controlled. Continue with current medications. Monitor BP.    3) LE edema: Mild and likely due to steroids. Defer diuretics for now. TTE with normal LVSF. Monitor UO.    4) Asthma exacerbation: On IV steroids. As per primary team.      [facility] NEPHROLOGY  Lj Ahumada M.D.  Robbie Fitch D.O.  Hansa Emmanuel M.D.  Vidhya Morales, MSN, ANP-C    Telephone: [phone]  Facsimile: [phone]    [address] [place] [address]

## 2022-06-01 ENCOUNTER — TRANSCRIPTION ENCOUNTER (OUTPATIENT)
Age: 82
End: 2022-06-01

## 2022-06-01 LAB
ANION GAP SERPL CALC-SCNC: 14 MMOL/L — SIGNIFICANT CHANGE UP (ref 5–17)
BUN SERPL-MCNC: 34 MG/DL — HIGH (ref 7–23)
CALCIUM SERPL-MCNC: 9.1 MG/DL — SIGNIFICANT CHANGE UP (ref 8.4–10.5)
CHLORIDE SERPL-SCNC: 90 MMOL/L — LOW (ref 96–108)
CO2 SERPL-SCNC: 24 MMOL/L — SIGNIFICANT CHANGE UP (ref 22–31)
CREAT SERPL-MCNC: 1.05 MG/DL — SIGNIFICANT CHANGE UP (ref 0.5–1.3)
EGFR: 71 ML/MIN/1.73M2 — SIGNIFICANT CHANGE UP
GLUCOSE SERPL-MCNC: 132 MG/DL — HIGH (ref 70–99)
HCT VFR BLD CALC: 44.8 % — SIGNIFICANT CHANGE UP (ref 39–50)
HGB BLD-MCNC: 15.7 G/DL — SIGNIFICANT CHANGE UP (ref 13–17)
INR BLD: 2.71 RATIO — HIGH (ref 0.88–1.16)
MCHC RBC-ENTMCNC: 33.1 PG — SIGNIFICANT CHANGE UP (ref 27–34)
MCHC RBC-ENTMCNC: 35 GM/DL — SIGNIFICANT CHANGE UP (ref 32–36)
MCV RBC AUTO: 94.3 FL — SIGNIFICANT CHANGE UP (ref 80–100)
NRBC # BLD: 0 /100 WBCS — SIGNIFICANT CHANGE UP (ref 0–0)
PLATELET # BLD AUTO: 142 K/UL — LOW (ref 150–400)
POTASSIUM SERPL-MCNC: 4.2 MMOL/L — SIGNIFICANT CHANGE UP (ref 3.5–5.3)
POTASSIUM SERPL-SCNC: 4.2 MMOL/L — SIGNIFICANT CHANGE UP (ref 3.5–5.3)
PROTHROM AB SERPL-ACNC: 31.7 SEC — HIGH (ref 10.5–13.4)
RBC # BLD: 4.75 M/UL — SIGNIFICANT CHANGE UP (ref 4.2–5.8)
RBC # FLD: 12.3 % — SIGNIFICANT CHANGE UP (ref 10.3–14.5)
SODIUM SERPL-SCNC: 128 MMOL/L — LOW (ref 135–145)
WBC # BLD: 11.03 K/UL — HIGH (ref 3.8–10.5)
WBC # FLD AUTO: 11.03 K/UL — HIGH (ref 3.8–10.5)

## 2022-06-01 RX ORDER — TOLVAPTAN 15 MG/1
15 TABLET ORAL ONCE
Refills: 0 | Status: COMPLETED | OUTPATIENT
Start: 2022-06-01 | End: 2022-06-01

## 2022-06-01 RX ORDER — WARFARIN SODIUM 2.5 MG/1
4 TABLET ORAL AT BEDTIME
Refills: 0 | Status: COMPLETED | OUTPATIENT
Start: 2022-06-01 | End: 2022-06-01

## 2022-06-01 RX ADMIN — Medication 15 GRAM(S): at 06:40

## 2022-06-01 RX ADMIN — Medication 200 MILLIGRAM(S): at 06:39

## 2022-06-01 RX ADMIN — Medication 3 MILLILITER(S): at 06:39

## 2022-06-01 RX ADMIN — WARFARIN SODIUM 4 MILLIGRAM(S): 2.5 TABLET ORAL at 21:54

## 2022-06-01 RX ADMIN — BUDESONIDE AND FORMOTEROL FUMARATE DIHYDRATE 2 PUFF(S): 160; 4.5 AEROSOL RESPIRATORY (INHALATION) at 17:43

## 2022-06-01 RX ADMIN — CARVEDILOL PHOSPHATE 25 MILLIGRAM(S): 80 CAPSULE, EXTENDED RELEASE ORAL at 06:39

## 2022-06-01 RX ADMIN — LOSARTAN POTASSIUM 50 MILLIGRAM(S): 100 TABLET, FILM COATED ORAL at 06:39

## 2022-06-01 RX ADMIN — Medication 20 MILLIGRAM(S): at 06:40

## 2022-06-01 RX ADMIN — TOLVAPTAN 15 MILLIGRAM(S): 15 TABLET ORAL at 12:47

## 2022-06-01 RX ADMIN — Medication 200 MILLIGRAM(S): at 17:45

## 2022-06-01 RX ADMIN — Medication 3 MILLILITER(S): at 23:54

## 2022-06-01 RX ADMIN — CARVEDILOL PHOSPHATE 25 MILLIGRAM(S): 80 CAPSULE, EXTENDED RELEASE ORAL at 17:38

## 2022-06-01 RX ADMIN — Medication 1: at 17:38

## 2022-06-01 RX ADMIN — BUDESONIDE AND FORMOTEROL FUMARATE DIHYDRATE 2 PUFF(S): 160; 4.5 AEROSOL RESPIRATORY (INHALATION) at 06:40

## 2022-06-01 RX ADMIN — Medication 3 MILLILITER(S): at 17:43

## 2022-06-01 RX ADMIN — TAMSULOSIN HYDROCHLORIDE 0.4 MILLIGRAM(S): 0.4 CAPSULE ORAL at 21:54

## 2022-06-01 NOTE — DISCHARGE NOTE PROVIDER - NSDCCPCAREPLAN_GEN_ALL_CORE_FT
PRINCIPAL DISCHARGE DIAGNOSIS  Diagnosis: COPD with asthma and status asthmaticus  Assessment and Plan of Treatment: improved  received inhaled steroids, duoneb, solumedrol 20 IV decreased to BID; Monitor closely   - C/w loratadine  - Monitor O2 sat; supplement O2 PRN   - Not complaints of SOB or dyspnea at time of visit 06/01      SECONDARY DISCHARGE DIAGNOSES  Diagnosis: PAF (paroxysmal atrial fibrillation)  Assessment and Plan of Treatment: Cont coumadin - Coumadin 4  for tonight   - C/w Coreg 25 BID   - F/U cardio recs  - TTE as above- LVH, Normal LV systolic function; F/u cardio recs       Diagnosis: Hyponatremia  Assessment and Plan of Treatment: Likely due to SIADH   - Na improving to 128 on AM labs   - Renal eval called; F/U recs  - F/U on urine lytes  - C/w free water restriction   - Avoid overcorrection   - Receiving Tolvaptan X 1 today per renal    Diagnosis: Diabetes mellitus  Assessment and Plan of Treatment: - A1C of 6.4   - Diabetic diet   - Placed on sliding scale   - Pt will require outpatient follow up upon discharge   - D/C on Metformin 500 w/ outpatient f/u     PRINCIPAL DISCHARGE DIAGNOSIS  Diagnosis: COPD with asthma and status asthmaticus  Assessment and Plan of Treatment: improved  received inhaled steroids, duoneb, solumedrol 20 IV decreased to BID; Monitor closely   - C/w loratadine  - Monitor O2 sat; supplement O2 PRN   - Not complaints of SOB or dyspnea at time of visit 06/01      SECONDARY DISCHARGE DIAGNOSES  Diagnosis: PAF (paroxysmal atrial fibrillation)  Assessment and Plan of Treatment: On Coumadin> hold today 6/2, resume 3mg tomorrow 6/3 and followup your INR labs  - C/w Coreg 25 BID   - F/U cardio recs  - TTE as above- LVH, Normal LV systolic function; F/u cardio recs       Diagnosis: Hyponatremia  Assessment and Plan of Treatment: Likely due to SIADH   - Na improving to 135 on AM labs   - Renal eval called; F/U recs  - F/U on urine lytes  - C/w free water restriction   - Avoid overcorrection   - Receiving Tolvaptan X 1 6/1 per renal    Diagnosis: Diabetes mellitus  Assessment and Plan of Treatment: - A1C of 6.4   - Diabetic diet   - Placed on sliding scale   - Pt will require outpatient follow up upon discharge   - D/C on Metformin 500 w/ outpatient f/u

## 2022-06-01 NOTE — PROGRESS NOTE ADULT - SUBJECTIVE AND OBJECTIVE BOX
DATE OF SERVICE: 06-01-22 @ 06:53    Subjective: Patient seen and examined. No new events except as noted.     SUBJECTIVE/ROS:      MEDICATIONS:  MEDICATIONS  (STANDING):  albuterol/ipratropium for Nebulization 3 milliLiter(s) Nebulizer every 6 hours  allopurinol 300 milliGRAM(s) Oral daily  budesonide 160 MICROgram(s)/formoterol 4.5 MICROgram(s) Inhaler 2 Puff(s) Inhalation two times a day  carvedilol 25 milliGRAM(s) Oral every 12 hours  dextrose 5%. 1000 milliLiter(s) (50 mL/Hr) IV Continuous <Continuous>  dextrose 5%. 1000 milliLiter(s) (100 mL/Hr) IV Continuous <Continuous>  dextrose 50% Injectable 25 Gram(s) IV Push once  dextrose 50% Injectable 12.5 Gram(s) IV Push once  dextrose 50% Injectable 25 Gram(s) IV Push once  glucagon  Injectable 1 milliGRAM(s) IntraMuscular once  insulin lispro (ADMELOG) corrective regimen sliding scale   SubCutaneous three times a day before meals  loratadine 10 milliGRAM(s) Oral daily  losartan 50 milliGRAM(s) Oral daily  methylPREDNISolone sodium succinate Injectable 20 milliGRAM(s) IV Push every 12 hours  montelukast 10 milliGRAM(s) Oral daily  tamsulosin 0.4 milliGRAM(s) Oral at bedtime  urea Oral Powder 15 Gram(s) Oral two times a day      PHYSICAL EXAM:  T(C): 36.6 (06-01-22 @ 05:13), Max: 36.8 (05-31-22 @ 13:22)  HR: 89 (06-01-22 @ 06:38) (83 - 98)  BP: 142/99 (06-01-22 @ 06:38) (119/91 - 142/99)  RR: 18 (06-01-22 @ 05:13) (18 - 18)  SpO2: 93% (06-01-22 @ 05:13) (91% - 94%)  Wt(kg): --  I&O's Summary    30 May 2022 07:01  -  31 May 2022 07:00  --------------------------------------------------------  IN: 1180 mL / OUT: 900 mL / NET: 280 mL    31 May 2022 07:01  -  01 Jun 2022 06:54  --------------------------------------------------------  IN: 960 mL / OUT: 900 mL / NET: 60 mL            JVP: Normal  Neck: supple  Lung: clear   CV: S1 S2 , Murmur:  Abd: soft  Ext: No edema  neuro: Awake / alert  Psych: flat affect  Skin: normal``    LABS/DATA:    CARDIAC MARKERS:                                14.7   12.68 )-----------( 128      ( 31 May 2022 07:39 )             41.8     05-31    125<L>  |  90<L>  |  47<H>  ----------------------------<  164<H>  5.1   |  25  |  1.18    Ca    8.9      31 May 2022 18:17  Phos  2.6     05-31      proBNP:   Lipid Profile:   HgA1c:   TSH: Thyroid Stimulating Hormone, Serum: 0.42 uIU/mL (05-31 @ 07:37)      TELE:  EKG:

## 2022-06-01 NOTE — DISCHARGE NOTE PROVIDER - HOSPITAL COURSE
severino is an 81 year old Male with a PMHx of HTN, DM2, asthma, BPH, Afib on coumadin, right RCC s/p nephrectomy, pw respiratory distress 2/2 asthma exacerbation, incidental hyponatremia    Acute asthma exacerbation.   - Cont inhaled steroids, duoneb, solumedrol 20 IV decreased to BID; Monitor closely   - C/w loratadine  - Monitor O2 sat; supplement O2 PRN   - Not complaints of SOB or dyspnea at time of visit 06/01    Hyponatremia  - Likely due to SIADH   - Na improving to 128 on AM labs   - Renal eval called; F/U recs  - F/U on urine lytes  - C/w free water restriction   - Avoid overcorrection   - Receiving Tolvaptan X 1 6/1 per renal    PAF (paroxysmal atrial fibrillation)  - Cont coumadin - Coumadin 4  for tonight   - C/w Coreg 25 BID   - F/U cardio recs  - TTE as above- LVH, Normal LV systolic function; F/u cardio recs     DM  - A1C of 6.4   - Diabetic diet   - Placed on sliding scale   - Pt will require outpatient follow up upon discharge   - D/C on Metformin 500 w/ outpatient f/u     Leukocytosis   - Likely reactive to steroids   - Monitor CBC, Temp curve, VS, and patient closely   - If febrile, check pan Cx     HTN (hypertension).   - C/w Coreg 25 BID   - Cardio follow up appreciated; F/U recs    BPH  - C/w tamsulosin   - Monitor I and O     PPX  - Coumadin     severino is an 81 year old Male with a PMHx of HTN, DM2, asthma, BPH, Afib on coumadin, right RCC s/p nephrectomy, pw respiratory distress 2/2 asthma exacerbation, incidental hyponatremia    Acute asthma exacerbation.   - Cont inhaled steroids, duoneb, solumedrol 20 IV decreased to BID; Monitor closely   - C/w loratadine  - Monitor O2 sat; supplement O2 PRN   - Not complaints of SOB or dyspnea at time of visit 06/01    Hyponatremia  - Likely due to SIADH   - Na improving to 128 on AM labs   - Renal eval called; F/U recs  - F/U on urine lytes  - C/w free water restriction   - Avoid overcorrection   - Receiving Tolvaptan X 1 6/1 per renal    PAF (paroxysmal atrial fibrillation)  - On Coumadin, hold today 6/2, followup your INR labs  - C/w Coreg 25 BID   - F/U cardio recs  - TTE as above- LVH, Normal LV systolic function; F/u cardio recs     DM  - A1C of 6.4   - Diabetic diet   - Placed on sliding scale   - Pt will require outpatient follow up upon discharge   - D/C on Metformin 500 w/ outpatient f/u     Leukocytosis   - Likely reactive to steroids   - Monitor CBC, Temp curve, VS, and patient closely   - If febrile, check pan Cx     HTN (hypertension).   - C/w Coreg 25 BID   - Cardio follow up appreciated; F/U recs    BPH  - C/w tamsulosin   - Monitor I and O     PPX  - Coumadin     severino is an 81 year old Male with a PMHx of HTN, DM2, asthma, BPH, Afib on coumadin, right RCC s/p nephrectomy, pw respiratory distress 2/2 asthma exacerbation, incidental hyponatremia    Acute asthma exacerbation.   - Cont inhaled steroids, duoneb, solumedrol 20 IV decreased to BID; Monitor closely   - C/w loratadine  - Monitor O2 sat; supplement O2 PRN   - Not complaints of SOB or dyspnea at time    Hyponatremia  - Likely due to SIADH   - Na improving to 128 on AM labs   - Renal eval called; F/U recs  - F/U on urine lytes  - C/w free water restriction   - Avoid overcorrection   - Receiving Tolvaptan X 1 6/1 per renal    PAF (paroxysmal atrial fibrillation)  - On Coumadin, hold today 6/2, resume 3mg tomorrow 6/3 and followup your INR labs  - C/w Coreg 25 BID   - TTE as above- LVH, Normal LV systolic function; F/u cardio recs     DM  - A1C of 6.4   - Diabetic diet   - Placed on sliding scale   - Pt will require outpatient follow up upon discharge   - D/C on Metformin 500 w/ outpatient f/u     Leukocytosis   - Likely reactive to steroids   - Monitor CBC, Temp curve, VS, and patient closely   - If febrile, check pan Cx     HTN (hypertension).   - C/w Coreg 25 BID   - Cardio follow up appreciated; F/U recs    BPH  - C/w tamsulosin   - Monitor I and O     PPX  - Coumadin

## 2022-06-01 NOTE — DISCHARGE NOTE PROVIDER - NSDCMRMEDTOKEN_GEN_ALL_CORE_FT
allopurinol 300 mg oral tablet: 1 tab(s) orally once a day in pm   carvedilol 25 mg oral tablet: 1 tab(s) orally every 12 hours  Coumadin 5 mg oral tablet: 1 tab(s) orally once a day  Dulera 200 mcg-5 mcg/inh inhalation aerosol: 2 puff(s) inhaled 2 times a day  losartan 50 mg oral tablet: 1 tab(s) orally 2 times a day  lovastatin 20 mg oral tablet: 1 tab(s) orally once a day in pm  montelukast 10 mg oral tablet: 1 tab(s) orally once a day in pm  Proventil HFA 90 mcg/inh inhalation aerosol: 2 puff(s) inhaled every 6 hours, As Needed  tamsulosin 0.4 mg oral capsule: 1 cap(s) orally once a day (at bedtime)   allopurinol 300 mg oral tablet: 1 tab(s) orally once a day in pm   carvedilol 25 mg oral tablet: 1 tab(s) orally every 12 hours  Coumadin 5 mg oral tablet: 1 tab(s) orally once a day  Dulera 200 mcg-5 mcg/inh inhalation aerosol: 2 puff(s) inhaled 2 times a day  loratadine 10 mg oral tablet: 1 tab(s) orally once a day  losartan 50 mg oral tablet: 1 tab(s) orally once a day  lovastatin 20 mg oral tablet: 1 tab(s) orally once a day in pm  metFORMIN 500 mg oral tablet, extended release: 1 tab(s) orally once a day   montelukast 10 mg oral tablet: 1 tab(s) orally once a day in pm  Proventil HFA 90 mcg/inh inhalation aerosol: 2 puff(s) inhaled every 6 hours, As Needed  tamsulosin 0.4 mg oral capsule: 1 cap(s) orally once a day (at bedtime)   allopurinol 300 mg oral tablet: 1 tab(s) orally once a day in pm   carvedilol 25 mg oral tablet: 1 tab(s) orally every 12 hours  Dulera 200 mcg-5 mcg/inh inhalation aerosol: 2 puff(s) inhaled 2 times a day  loratadine 10 mg oral tablet: 1 tab(s) orally once a day  losartan 50 mg oral tablet: 1 tab(s) orally once a day  lovastatin 20 mg oral tablet: 1 tab(s) orally once a day in pm  metFORMIN 500 mg oral tablet, extended release: 1 tab(s) orally once a day   montelukast 10 mg oral tablet: 1 tab(s) orally once a day in pm  Proventil HFA 90 mcg/inh inhalation aerosol: 2 puff(s) inhaled every 6 hours, As Needed  tamsulosin 0.4 mg oral capsule: 1 cap(s) orally once a day (at bedtime)  warfarin 3 mg oral tablet: 1 tab(s) orally once a day (at bedtime)   hold today 6/2, resume 3mg tomorrow 6/3 and followup your INR labs   allopurinol 300 mg oral tablet: 1 tab(s) orally once a day in pm   benzonatate 100 mg oral capsule: 2 cap(s) orally every 8 hours, As needed, Cough  carvedilol 25 mg oral tablet: 1 tab(s) orally every 12 hours  Dulera 200 mcg-5 mcg/inh inhalation aerosol: 2 puff(s) inhaled 2 times a day  loratadine 10 mg oral tablet: 1 tab(s) orally once a day  losartan 50 mg oral tablet: 1 tab(s) orally once a day  lovastatin 20 mg oral tablet: 1 tab(s) orally once a day in pm  metFORMIN 500 mg oral tablet, extended release: 1 tab(s) orally once a day   montelukast 10 mg oral tablet: 1 tab(s) orally once a day in pm  Proventil HFA 90 mcg/inh inhalation aerosol: 2 puff(s) inhaled every 6 hours, As Needed  tamsulosin 0.4 mg oral capsule: 1 cap(s) orally once a day (at bedtime)  warfarin 3 mg oral tablet: 1 tab(s) orally once a day (at bedtime)   hold today 6/2, resume 3mg tomorrow 6/3 and followup your INR labs

## 2022-06-01 NOTE — PROGRESS NOTE ADULT - SUBJECTIVE AND OBJECTIVE BOX
Name of Patient : STEFAN DURAND  MRN: [id number]  Date of visit: 06-01-22 @ 13:18      Subjective: Patient seen and examined. No new events except as noted.   Patient seen earlier this AM. Doing okay. Reports breathing has improved. Denies CP, palpitations, SOB or dyspnea.  Steroids decreased to daily.  Na improving to 128.   OOB TC.     REVIEW OF SYSTEMS:    CONSTITUTIONAL: No weakness, fevers or chills  EYES/ENT: No visual changes;  No vertigo or throat pain   NECK: No pain or stiffness  RESPIRATORY: No cough, wheezing, hemoptysis; No shortness of breath  CARDIOVASCULAR: No chest pain or palpitations  GASTROINTESTINAL: No abdominal or epigastric pain. No nausea, vomiting, or hematemesis; No diarrhea or constipation. No melena or hematochezia.  GENITOURINARY: No dysuria, frequency or hematuria  NEUROLOGICAL: No numbness or weakness  SKIN: No itching, burning, rashes, or lesions   All other review of systems is negative unless indicated above.    MEDICATIONS:  MEDICATIONS  (STANDING):  albuterol/ipratropium for Nebulization 3 milliLiter(s) Nebulizer every 6 hours  allopurinol 300 milliGRAM(s) Oral daily  budesonide 160 MICROgram(s)/formoterol 4.5 MICROgram(s) Inhaler 2 Puff(s) Inhalation two times a day  carvedilol 25 milliGRAM(s) Oral every 12 hours  dextrose 5%. 1000 milliLiter(s) (100 mL/Hr) IV Continuous <Continuous>  dextrose 5%. 1000 milliLiter(s) (50 mL/Hr) IV Continuous <Continuous>  dextrose 50% Injectable 25 Gram(s) IV Push once  dextrose 50% Injectable 12.5 Gram(s) IV Push once  dextrose 50% Injectable 25 Gram(s) IV Push once  glucagon  Injectable 1 milliGRAM(s) IntraMuscular once  insulin lispro (ADMELOG) corrective regimen sliding scale   SubCutaneous three times a day before meals  loratadine 10 milliGRAM(s) Oral daily  losartan 50 milliGRAM(s) Oral daily  methylPREDNISolone sodium succinate Injectable 20 milliGRAM(s) IV Push daily  montelukast 10 milliGRAM(s) Oral daily  tamsulosin 0.4 milliGRAM(s) Oral at bedtime  warfarin 4 milliGRAM(s) Oral at bedtime      PHYSICAL EXAM:  T(C): 36.9 (06-01-22 @ 13:02), Max: 36.9 (06-01-22 @ 13:02)  HR: 82 (06-01-22 @ 13:02) (82 - 98)  BP: 136/84 (06-01-22 @ 13:02) (120/80 - 142/99)  RR: 18 (06-01-22 @ 13:02) (18 - 18)  SpO2: 94% (06-01-22 @ 13:02) (91% - 95%)  Wt(kg): --  I&O's Summary    31 May 2022 07:01  -  01 Jun 2022 07:00  --------------------------------------------------------  IN: 960 mL / OUT: 900 mL / NET: 60 mL    01 Jun 2022 07:01  -  01 Jun 2022 13:18  --------------------------------------------------------  IN: 350 mL / OUT: 300 mL / NET: 50 mL          Appearance: Normal, OOB TC	  HEENT:  PERRLA   Lymphatic: No lymphadenopathy   Cardiovascular: Normal S1 S2, no JVD  Respiratory: normal effort , clear; no wheezing   Gastrointestinal:  Soft, Non-tender  Skin: No rashes,  warm to touch  Psychiatry:  Mood & affect appropriate  Musculoskeletal: No edema          05-31-22 @ 07:01  -  06-01-22 @ 07:00  --------------------------------------------------------  IN: 960 mL / OUT: 900 mL / NET: 60 mL    06-01-22 @ 07:01  -  06-01-22 @ 13:18  --------------------------------------------------------  IN: 350 mL / OUT: 300 mL / NET: 50 mL                              15.7   11.03 )-----------( 142      ( 01 Jun 2022 07:29 )             44.8               06-01    128<L>  |  90<L>  |  34<H>  ----------------------------<  132<H>  4.2   |  24  |  1.05    Ca    9.1      01 Jun 2022 07:23  Phos  2.6     05-31      PT/INR - ( 01 Jun 2022 07:29 )   PT: 31.7 sec;   INR: 2.71 ratio

## 2022-06-01 NOTE — PROGRESS NOTE ADULT - ASSESSMENT
Patient is an 81 year old Male with a PMHx of HTN, DM2, asthma, BPH, Afib on coumadin, right RCC s/p nephrectomy, pw respiratory distress 2/2 asthma exacerbation, incidental hyponatremia    Acute asthma exacerbation.   - Cont inhaled steroids, duoneb, solumedrol 20 IV decreased to BID; Monitor closely   - C/w loratadine  - Monitor O2 sat; supplement O2 PRN   - Not complaints of SOB or dyspnea at time of visit 06/01    Hyponatremia  - Likely due to SIADH   - Na improving to 128 on AM labs   - Renal eval called; F/U recs  - F/U on urine lytes  - C/w free water restriction   - Avoid overcorrection   - Receiving Tolvaptan X 1 today per renal    PAF (paroxysmal atrial fibrillation)  - Cont coumadin - Coumadin 4  for tonight   - C/w Coreg 25 BID   - F/U cardio recs  - TTE as above- LVH, Normal LV systolic function; F/u cardio recs     DM  - A1C of 6.4   - Diabetic diet   - Placed on sliding scale   - Pt will require outpatient follow up upon discharge   - D/C on Metformin 500 w/ outpatient f/u     Leukocytosis   - Likely reactive to steroids   - Monitor CBC, Temp curve, VS, and patient closely   - If febrile, check pan Cx     HTN (hypertension).   - C/w Coreg 25 BID   - Cardio follow up appreciated; F/U recs    BPH  - C/w tamsulosin   - Monitor I and O     PPX  - Coumadin

## 2022-06-01 NOTE — DISCHARGE NOTE PROVIDER - NSDCFUADDAPPT_GEN_ALL_CORE_FT
APPTS ARE READY TO BE MADE: [x ] YES    Best Family or Patient Contact (if needed):    Additional Information about above appointments (if needed):    1: Please followup with your primary in 1-2 weeks, you were started on a Diabetic medication  2: Please followup with your cardiologist in 1-2 weeks  3:     Other comments or requests:    APPTS ARE READY TO BE MADE: [x ] YES    Best Family or Patient Contact (if needed):    Additional Information about above appointments (if needed):    1: Please followup with your primary in 1-2 weeks, you were started on Metformin for diabetes. Also followup for your INR labs  2: Please followup with your cardiologist in 1-2 weeks  3:     Other comments or requests:

## 2022-06-01 NOTE — PROGRESS NOTE ADULT - ASSESSMENT
Elevated proBNP   ? from right sided elevated pressures in setting of asthma  echo shows LVH , normal LV function   will hold off to diuresis given low NA unless renal believes it would help    long standing afib  HR stable  cont BB  cont a/c , pt was advised in the past to change to NOAC , he is reluctant   cont warfarin     HTN  cont current meds

## 2022-06-01 NOTE — PROGRESS NOTE ADULT - SUBJECTIVE AND OBJECTIVE BOX
[facility] NEPHROLOGY- PROGRESS NOTE    81y Male with history of HTN, DM, R RCC s/p nephrectomy presents with respiratory distress. Nephrology consulted for hyponatremia.    REVIEW OF SYSTEMS:  Gen: no changes in weight  Cards: no chest pain  Resp: no dyspnea, + cough  GI: no nausea or vomiting or diarrhea  Vascular: no LE edema    pantoprazole (Rash)  penicillin (Unknown)      Hospital Medications: Medications reviewed      VITALS:  T(F): 97.9 (22 @ 09:24), Max: 98.2 (22 @ 13:22)  HR: 95 (22 @ 09:24)  BP: 120/80 (22 @ 09:24)  RR: 18 (22 @ 09:24)  SpO2: 95% (22 @ 09:24)  Wt(kg): --     @ 07:01  -   @ 07:00  --------------------------------------------------------  IN: 960 mL / OUT: 900 mL / NET: 60 mL          PHYSICAL EXAM:    Gen: NAD, calm  Cards: RRR, +S1/S2, no M/G/R  Resp: CTA B/L  GI: soft, NT/ND, NABS  Vascular: trace LLE edema > RLE edema      LABS:      124<L>  |  88<L>  |  30<H>  ----------------------------<  200<H>  4.8   |  22  |  0.94    Ca    8.8      31 May 2022 07:37  Phos  2.6           Creatinine Trend: 0.94 <--, 1.08 <--, 0.88 <--, 0.80 <--, 0.80 <--, 0.86 <--                        14.7   12.68 )-----------( 128      ( 31 May 2022 07:39 )             41.8     Urine Studies:  Urinalysis Basic - ( 30 May 2022 10:52 )    Color: Light Yellow / Appearance: Clear / S.014 / pH:   Gluc:  / Ketone: Negative  / Bili: Negative / Urobili: Negative   Blood:  / Protein: Negative / Nitrite: Negative   Leuk Esterase: Negative / RBC:  / WBC    Sq Epi:  / Non Sq Epi:  / Bacteria:       Osmolality, Random Urine: 395 mos/kg ( @ 10:52)  Sodium, Random Urine: 12 mmol/L ( @ 10:52)  Creatinine, Random Urine: 62 mg/dL ( @ 10:52)  Protein/Creatinine Ratio Calculation: 0.1 Ratio ( @ 10:52)  Potassium, Random Urine: 24 mmol/L ( @ 10:52)  Sodium, Random Urine: 37 mmol/L ( @ 06:40)  Creatinine, Random Urine: 20 mg/dL ( @ 06:40)  Osmolality, Random Urine: 186 mos/kg ( @ 06:40)      RADIOLOGY & ADDITIONAL STUDIES:

## 2022-06-01 NOTE — PROGRESS NOTE ADULT - ASSESSMENT
81y Male with history of HTN, DM, R RCC s/p nephrectomy presents with respiratory distress. Nephrology consulted for hyponatremia.    1) Hyponatremia: Hypotonic hyponatremia with urine studies consistent with SIADH. Serum Na improving. Discontinue Urea and will treat with tolvaptan 15 mg PO X 1 dose today. Continue with 1L FR. Monitor serum Na.    2) HTN: BP controlled. Continue with current medications. Monitor BP.    3) LE edema: Mild and likely due to steroids. Tolvaptan as above which will induce free water diuresis. TTE with normal LVSF. Monitor UO.    4) Asthma exacerbation: On IV steroids. As per primary team.      [facility] NEPHROLOGY  Lj Ahumada M.D.  Robbie Fitch D.O.  Hansa Emmanuel M.D.  Vidhya Morales, MSN, ANP-C    Telephone: [phone]  Facsimile: [phone]    [address] [place] [address]

## 2022-06-02 ENCOUNTER — TRANSCRIPTION ENCOUNTER (OUTPATIENT)
Age: 82
End: 2022-06-02

## 2022-06-02 VITALS
TEMPERATURE: 98 F | HEART RATE: 92 BPM | OXYGEN SATURATION: 94 % | RESPIRATION RATE: 18 BRPM | SYSTOLIC BLOOD PRESSURE: 118 MMHG | DIASTOLIC BLOOD PRESSURE: 82 MMHG

## 2022-06-02 LAB
ANION GAP SERPL CALC-SCNC: 10 MMOL/L — SIGNIFICANT CHANGE UP (ref 5–17)
APTT BLD: 42.6 SEC — HIGH (ref 27.5–35.5)
BUN SERPL-MCNC: 32 MG/DL — HIGH (ref 7–23)
CALCIUM SERPL-MCNC: 9.5 MG/DL — SIGNIFICANT CHANGE UP (ref 8.4–10.5)
CHLORIDE SERPL-SCNC: 97 MMOL/L — SIGNIFICANT CHANGE UP (ref 96–108)
CO2 SERPL-SCNC: 28 MMOL/L — SIGNIFICANT CHANGE UP (ref 22–31)
CREAT SERPL-MCNC: 1.14 MG/DL — SIGNIFICANT CHANGE UP (ref 0.5–1.3)
EGFR: 65 ML/MIN/1.73M2 — SIGNIFICANT CHANGE UP
GLUCOSE SERPL-MCNC: 107 MG/DL — HIGH (ref 70–99)
HCT VFR BLD CALC: 46.5 % — SIGNIFICANT CHANGE UP (ref 39–50)
HGB BLD-MCNC: 16.2 G/DL — SIGNIFICANT CHANGE UP (ref 13–17)
INR BLD: 3.59 RATIO — HIGH (ref 0.88–1.16)
MCHC RBC-ENTMCNC: 33.2 PG — SIGNIFICANT CHANGE UP (ref 27–34)
MCHC RBC-ENTMCNC: 34.8 GM/DL — SIGNIFICANT CHANGE UP (ref 32–36)
MCV RBC AUTO: 95.3 FL — SIGNIFICANT CHANGE UP (ref 80–100)
NRBC # BLD: 0 /100 WBCS — SIGNIFICANT CHANGE UP (ref 0–0)
PLATELET # BLD AUTO: 155 K/UL — SIGNIFICANT CHANGE UP (ref 150–400)
POTASSIUM SERPL-MCNC: 4.3 MMOL/L — SIGNIFICANT CHANGE UP (ref 3.5–5.3)
POTASSIUM SERPL-SCNC: 4.3 MMOL/L — SIGNIFICANT CHANGE UP (ref 3.5–5.3)
PROTHROM AB SERPL-ACNC: 41.8 SEC — HIGH (ref 10.5–13.4)
RBC # BLD: 4.88 M/UL — SIGNIFICANT CHANGE UP (ref 4.2–5.8)
RBC # FLD: 12.4 % — SIGNIFICANT CHANGE UP (ref 10.3–14.5)
SODIUM SERPL-SCNC: 135 MMOL/L — SIGNIFICANT CHANGE UP (ref 135–145)
WBC # BLD: 11.77 K/UL — HIGH (ref 3.8–10.5)
WBC # FLD AUTO: 11.77 K/UL — HIGH (ref 3.8–10.5)

## 2022-06-02 PROCEDURE — 84550 ASSAY OF BLOOD/URIC ACID: CPT

## 2022-06-02 PROCEDURE — 83930 ASSAY OF BLOOD OSMOLALITY: CPT

## 2022-06-02 PROCEDURE — 84100 ASSAY OF PHOSPHORUS: CPT

## 2022-06-02 PROCEDURE — 36415 COLL VENOUS BLD VENIPUNCTURE: CPT

## 2022-06-02 PROCEDURE — 84300 ASSAY OF URINE SODIUM: CPT

## 2022-06-02 PROCEDURE — 81003 URINALYSIS AUTO W/O SCOPE: CPT

## 2022-06-02 PROCEDURE — 94640 AIRWAY INHALATION TREATMENT: CPT

## 2022-06-02 PROCEDURE — 83735 ASSAY OF MAGNESIUM: CPT

## 2022-06-02 PROCEDURE — 82570 ASSAY OF URINE CREATININE: CPT

## 2022-06-02 PROCEDURE — 80053 COMPREHEN METABOLIC PANEL: CPT

## 2022-06-02 PROCEDURE — C8929: CPT

## 2022-06-02 PROCEDURE — 0225U NFCT DS DNA&RNA 21 SARSCOV2: CPT

## 2022-06-02 PROCEDURE — 84145 PROCALCITONIN (PCT): CPT

## 2022-06-02 PROCEDURE — 93970 EXTREMITY STUDY: CPT

## 2022-06-02 PROCEDURE — 85610 PROTHROMBIN TIME: CPT

## 2022-06-02 PROCEDURE — 84443 ASSAY THYROID STIM HORMONE: CPT

## 2022-06-02 PROCEDURE — 96374 THER/PROPH/DIAG INJ IV PUSH: CPT

## 2022-06-02 PROCEDURE — 83880 ASSAY OF NATRIURETIC PEPTIDE: CPT

## 2022-06-02 PROCEDURE — 84560 ASSAY OF URINE/URIC ACID: CPT

## 2022-06-02 PROCEDURE — 85027 COMPLETE CBC AUTOMATED: CPT

## 2022-06-02 PROCEDURE — 83036 HEMOGLOBIN GLYCOSYLATED A1C: CPT

## 2022-06-02 PROCEDURE — 82962 GLUCOSE BLOOD TEST: CPT

## 2022-06-02 PROCEDURE — 84156 ASSAY OF PROTEIN URINE: CPT

## 2022-06-02 PROCEDURE — 71045 X-RAY EXAM CHEST 1 VIEW: CPT

## 2022-06-02 PROCEDURE — 83935 ASSAY OF URINE OSMOLALITY: CPT

## 2022-06-02 PROCEDURE — 85730 THROMBOPLASTIN TIME PARTIAL: CPT

## 2022-06-02 PROCEDURE — 85025 COMPLETE CBC W/AUTO DIFF WBC: CPT

## 2022-06-02 PROCEDURE — 84540 ASSAY OF URINE/UREA-N: CPT

## 2022-06-02 PROCEDURE — 93005 ELECTROCARDIOGRAM TRACING: CPT

## 2022-06-02 PROCEDURE — 80048 BASIC METABOLIC PNL TOTAL CA: CPT

## 2022-06-02 PROCEDURE — 99285 EMERGENCY DEPT VISIT HI MDM: CPT | Mod: 25

## 2022-06-02 PROCEDURE — 97161 PT EVAL LOW COMPLEX 20 MIN: CPT

## 2022-06-02 PROCEDURE — 84484 ASSAY OF TROPONIN QUANT: CPT

## 2022-06-02 PROCEDURE — 84133 ASSAY OF URINE POTASSIUM: CPT

## 2022-06-02 RX ORDER — WARFARIN SODIUM 2.5 MG/1
1 TABLET ORAL
Qty: 30 | Refills: 0
Start: 2022-06-02 | End: 2022-07-01

## 2022-06-02 RX ORDER — LOSARTAN POTASSIUM 100 MG/1
1 TABLET, FILM COATED ORAL
Qty: 0 | Refills: 0 | DISCHARGE
Start: 2022-06-02

## 2022-06-02 RX ORDER — LORATADINE 10 MG/1
1 TABLET ORAL
Qty: 30 | Refills: 0
Start: 2022-06-02 | End: 2022-07-01

## 2022-06-02 RX ORDER — METFORMIN HYDROCHLORIDE 850 MG/1
1 TABLET ORAL
Qty: 30 | Refills: 0
Start: 2022-06-02 | End: 2022-07-01

## 2022-06-02 RX ADMIN — CARVEDILOL PHOSPHATE 25 MILLIGRAM(S): 80 CAPSULE, EXTENDED RELEASE ORAL at 05:30

## 2022-06-02 RX ADMIN — BUDESONIDE AND FORMOTEROL FUMARATE DIHYDRATE 2 PUFF(S): 160; 4.5 AEROSOL RESPIRATORY (INHALATION) at 05:29

## 2022-06-02 RX ADMIN — MONTELUKAST 10 MILLIGRAM(S): 4 TABLET, CHEWABLE ORAL at 12:00

## 2022-06-02 RX ADMIN — Medication 200 MILLIGRAM(S): at 16:08

## 2022-06-02 RX ADMIN — Medication 300 MILLIGRAM(S): at 12:00

## 2022-06-02 RX ADMIN — Medication 1: at 08:29

## 2022-06-02 RX ADMIN — Medication 20 MILLIGRAM(S): at 05:28

## 2022-06-02 RX ADMIN — Medication 3 MILLILITER(S): at 12:00

## 2022-06-02 RX ADMIN — Medication 3 MILLILITER(S): at 05:29

## 2022-06-02 RX ADMIN — LORATADINE 10 MILLIGRAM(S): 10 TABLET ORAL at 12:00

## 2022-06-02 RX ADMIN — LOSARTAN POTASSIUM 50 MILLIGRAM(S): 100 TABLET, FILM COATED ORAL at 05:30

## 2022-06-02 NOTE — PROGRESS NOTE ADULT - ASSESSMENT
Patient is an 81 year old Male with a PMHx of HTN, DM2, asthma, BPH, Afib on coumadin, right RCC s/p nephrectomy, pw respiratory distress 2/2 asthma exacerbation, incidental hyponatremia    Acute asthma exacerbation.   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   - Na improving to 135 on AM labs   - Renal eval called; F/U recs  - F/U on urine lytes  - C/w free water restriction   - Avoid overcorrection   - S/P Tolvaptan X 1 per renal    PAF (paroxysmal atrial fibrillation)  - Cont coumadin - Coumadin 4  for tonight   - C/w Coreg 25 BID   - F/U cardio recs  - TTE as above- LVH, Normal LV systolic function; F/u cardio recs   - INR elevated - hold coumadin tonight, resume coumadin 3 tomorrow night and F/U with PMD for INR check     DM  - A1C of 6.4   - Diabetic diet   - Placed on sliding scale   - Pt will require outpatient follow up upon discharge   - D/C on Metformin 500 w/ outpatient f/u     Leukocytosis   - Likely reactive to steroids   - Monitor CBC, Temp curve, VS, and patient closely   - If febrile, check pan Cx     HTN (hypertension).   - C/w Coreg 25 BID   - Cardio follow up appreciated; F/U recs    BPH  - C/w tamsulosin   - Monitor I and O     PPX  - Coumadin                Patient is an 81 year old Male with a PMHx of HTN, DM2, asthma, BPH, Afib on coumadin, right RCC s/p nephrectomy, pw respiratory distress 2/2 asthma exacerbation, incidental hyponatremia    Acute asthma exacerbation.   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   - Na improving to 135 on AM labs   - Renal eval called; F/U recs  - F/U on urine lytes  - C/w free water restriction   - Avoid overcorrection   - S/P Tolvaptan X 1 per renal    PAF (paroxysmal atrial fibrillation)  - Cont coumadin - Coumadin 4  for tonight   - C/w Coreg 25 BID   - F/U cardio recs  - TTE as above- LVH, Normal LV systolic function; F/u cardio recs   - INR elevated - hold coumadin tonight, resume coumadin 3 tomorrow night and F/U with PMD for INR check     DM  - A1C of 6.4   - Diabetic diet   - Placed on sliding scale   - Pt will require outpatient follow up upon discharge   - D/C on Metformin 500 w/ outpatient f/u     Leukocytosis   - Likely reactive to steroids   - Monitor CBC, Temp curve, VS, and patient closely   - If febrile, check pan Cx     HTN (hypertension).   - C/w Coreg 25 BID   - Cardio follow up appreciated; F/U recs    BPH  - C/w tamsulosin   - Monitor I and O     PPX  - Coumadin

## 2022-06-02 NOTE — DISCHARGE NOTE NURSING/CASE MANAGEMENT/SOCIAL WORK - PATIENT PORTAL LINK FT
You can access the FollowMyHealth Patient Portal offered by [facility] by registering at the following website: [email or web address]. By joining [employer]’s FollowMyHealth portal, you will also be able to view your health information using other applications (apps) compatible with our system.

## 2022-06-02 NOTE — PROGRESS NOTE ADULT - ASSESSMENT
81y Male with history of HTN, DM, R RCC s/p nephrectomy presents with respiratory distress. Nephrology consulted for hyponatremia.    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 F/U serum Na this morning. Continue with 1L FR. Monitor serum Na.    2) HTN: BP controlled. Continue with current medications. Monitor BP.    3) LE edema: Mild and likely due to steroids improving s/p tolvaptan. NO need for diuretics. TTE with normal LVSF. Monitor UO.    4) Asthma exacerbation: On IV steroids. As per primary team.      [facility] NEPHROLOGY  Lj Ahumada M.D.  Robbie Fitch D.O.  Hansa Emmanuel M.D.  Vidhya Morales, MSN, ANP-C    Telephone: [phone]  Facsimile: [phone]    [address] [place] [address]

## 2022-06-02 NOTE — PROGRESS NOTE ADULT - NS ATTEND AMEND GEN_ALL_CORE FT
Pt care and plan discussed and reviewed with PA. Plan as outlined above edited by me to reflect our discussion.
Pt care and plan discussed and reviewed with PA. Plan as outlined above edited by me to reflect our discussion. Thirty five minutes spent on encounter, of which more than fifty percent of the encounter was spent on counseling and/or coordinating care by the attending physician.

## 2022-06-02 NOTE — DISCHARGE NOTE NURSING/CASE MANAGEMENT/SOCIAL WORK - NSDCPEFALRISK_GEN_ALL_CORE
For information on Fall & Injury Prevention, visit: [email or web address] OR  [email or web address] OR  https://www.cdc.gov/steadi/patient.html

## 2022-06-02 NOTE — PROGRESS NOTE ADULT - REASON FOR ADMISSION
sob, wheezing

## 2022-06-02 NOTE — PROGRESS NOTE ADULT - ASSESSMENT
Elevated proBNP   ? from right sided elevated pressures in setting of asthma  echo shows LVH , normal LV function   will hold off to diuresis given low NA unless renal believes it would help    long standing afib  HR stable  cont BB  cont a/c , pt was advised in the past to change to NOAC , he is reluctant   cont warfarin     HTN  cont current meds     Hyponatremia  improving   plan as per renal

## 2022-06-02 NOTE — PROGRESS NOTE ADULT - SUBJECTIVE AND OBJECTIVE BOX
[facility] NEPHROLOGY- PROGRESS NOTE    81y Male with history of HTN, DM, R RCC s/p nephrectomy presents with respiratory distress. Nephrology consulted for hyponatremia.    REVIEW OF SYSTEMS:  Gen: no changes in weight  Cards: no chest pain  Resp: no dyspnea, + cough  GI: no nausea or vomiting or diarrhea  Vascular: no LE edema    pantoprazole (Rash)  penicillin (Unknown)      Hospital Medications: Medications reviewed      VITALS:  T(F): 98.7 (22 @ 05:27), Max: 98.7 (22 @ 05:27)  HR: 87 (22 @ 05:27)  BP: 110/83 (22 @ 05:27)  RR: 18 (22 @ 05:27)  SpO2: 95% (22 @ 05:27)  Wt(kg): --     @ 07:01  -   @ 07:00  --------------------------------------------------------  IN: 1150 mL / OUT: 750 mL / NET: 400 mL          PHYSICAL EXAM:    Gen: NAD, calm  Cards: RRR, +S1/S2, no M/G/R  Resp: CTA B/L  GI: soft, NT/ND, NABS  Vascular: no LE edema B/L      LABS:      128<L>  |  90<L>  |  34<H>  ----------------------------<  132<H>  4.2   |  24  |  1.05    Ca    9.1      2022 07:23      Creatinine Trend: 1.05 <--, 1.18 <--, 0.94 <--, 1.08 <--, 0.88 <--, 0.80 <--, 0.80 <--, 0.86 <--                        16.2   11.77 )-----------( 155      ( 2022 08:29 )             46.5     Urine Studies:  Urinalysis Basic - ( 30 May 2022 10:52 )    Color: Light Yellow / Appearance: Clear / S.014 / pH:   Gluc:  / Ketone: Negative  / Bili: Negative / Urobili: Negative   Blood:  / Protein: Negative / Nitrite: Negative   Leuk Esterase: Negative / RBC:  / WBC    Sq Epi:  / Non Sq Epi:  / Bacteria:       Sodium, Random Urine: 43 mmol/L ( @ 13:03)  Osmolality, Random Urine: 405 mos/kg ( @ 13:03)  Osmolality, Random Urine: 395 mos/kg ( @ 10:52)  Sodium, Random Urine: 12 mmol/L ( @ 10:52)  Creatinine, Random Urine: 62 mg/dL ( @ 10:52)  Protein/Creatinine Ratio Calculation: 0.1 Ratio ( @ 10:52)  Potassium, Random Urine: 24 mmol/L ( @ 10:52)  Sodium, Random Urine: 37 mmol/L ( @ 06:40)  Creatinine, Random Urine: 20 mg/dL ( @ 06:40)  Osmolality, Random Urine: 186 mos/kg ( @ 06:40)

## 2022-06-02 NOTE — PROGRESS NOTE ADULT - PROVIDER SPECIALTY LIST ADULT
Cardiology
Internal Medicine
Cardiology
Internal Medicine
Nephrology
Nephrology
Cardiology
Cardiology
Internal Medicine
Nephrology

## 2022-06-02 NOTE — DISCHARGE NOTE NURSING/CASE MANAGEMENT/SOCIAL WORK - NSDCFUADDAPPT_GEN_ALL_CORE_FT
APPTS ARE READY TO BE MADE: [x ] YES    Best Family or Patient Contact (if needed):    Additional Information about above appointments (if needed):    1: Please followup with your primary in 1-2 weeks, you were started on Metformin for diabetes. Also followup for your INR labs  2: Please followup with your cardiologist in 1-2 weeks  3:     Other comments or requests:

## 2022-06-02 NOTE — PROGRESS NOTE ADULT - SUBJECTIVE AND OBJECTIVE BOX
DATE OF SERVICE: 06-02-22 @ 07:39    Subjective: Patient seen and examined. No new events except as noted.     SUBJECTIVE/ROS:  nad      MEDICATIONS:  MEDICATIONS  (STANDING):  albuterol/ipratropium for Nebulization 3 milliLiter(s) Nebulizer every 6 hours  allopurinol 300 milliGRAM(s) Oral daily  budesonide 160 MICROgram(s)/formoterol 4.5 MICROgram(s) Inhaler 2 Puff(s) Inhalation two times a day  carvedilol 25 milliGRAM(s) Oral every 12 hours  dextrose 5%. 1000 milliLiter(s) (50 mL/Hr) IV Continuous <Continuous>  dextrose 5%. 1000 milliLiter(s) (100 mL/Hr) IV Continuous <Continuous>  dextrose 50% Injectable 25 Gram(s) IV Push once  dextrose 50% Injectable 12.5 Gram(s) IV Push once  dextrose 50% Injectable 25 Gram(s) IV Push once  glucagon  Injectable 1 milliGRAM(s) IntraMuscular once  insulin lispro (ADMELOG) corrective regimen sliding scale   SubCutaneous three times a day before meals  loratadine 10 milliGRAM(s) Oral daily  losartan 50 milliGRAM(s) Oral daily  methylPREDNISolone sodium succinate Injectable 20 milliGRAM(s) IV Push daily  montelukast 10 milliGRAM(s) Oral daily  tamsulosin 0.4 milliGRAM(s) Oral at bedtime      PHYSICAL EXAM:  T(C): 37.1 (06-02-22 @ 05:27), Max: 37.1 (06-02-22 @ 05:27)  HR: 87 (06-02-22 @ 05:27) (82 - 98)  BP: 110/83 (06-02-22 @ 05:27) (110/83 - 142/98)  RR: 18 (06-02-22 @ 05:27) (18 - 18)  SpO2: 95% (06-02-22 @ 05:27) (94% - 96%)  Wt(kg): --  I&O's Summary    01 Jun 2022 07:01  -  02 Jun 2022 07:00  --------------------------------------------------------  IN: 1150 mL / OUT: 750 mL / NET: 400 mL            JVP: Normal  Neck: supple  Lung: clear   CV: S1 S2 , Murmur:  Abd: soft  Ext: No edema  neuro: Awake / alert  Psych: flat affect  Skin: normal``    LABS/DATA:    CARDIAC MARKERS:                                15.7   11.03 )-----------( 142      ( 01 Jun 2022 07:29 )             44.8     06-01    128<L>  |  90<L>  |  34<H>  ----------------------------<  132<H>  4.2   |  24  |  1.05    Ca    9.1      01 Jun 2022 07:23      proBNP:   Lipid Profile:   HgA1c:   TSH:     TELE:  EKG:

## 2022-06-02 NOTE — PROGRESS NOTE ADULT - SUBJECTIVE AND OBJECTIVE BOX
Name of Patient : STEFAN DURAND  MRN: [id number]  Date of visit: 06-02-22 @ 14:09      Subjective: Patient seen and examined. No new events except as noted.   Patient seen earlier this AM. Denies CP, palpitations, SOB or dyspnea.   Denies abdominal pain or discomfort. S/P Tolvaptan yesterday - Na improved to 135. S/P IV steroids today   Planning for D/C home today with outpatient follow up.     REVIEW OF SYSTEMS:    CONSTITUTIONAL: No weakness, fevers or chills  EYES/ENT: No visual changes;  No vertigo or throat pain   NECK: No pain or stiffness  RESPIRATORY: No cough, wheezing, hemoptysis; No shortness of breath  CARDIOVASCULAR: No chest pain or palpitations  GASTROINTESTINAL: No abdominal or epigastric pain. No nausea, vomiting, or hematemesis; No diarrhea or constipation. No melena or hematochezia.  GENITOURINARY: No dysuria, frequency or hematuria  NEUROLOGICAL: No numbness or weakness  SKIN: No itching, burning, rashes, or lesions   All other review of systems is negative unless indicated above.    MEDICATIONS:  MEDICATIONS  (STANDING):  albuterol/ipratropium for Nebulization 3 milliLiter(s) Nebulizer every 6 hours  allopurinol 300 milliGRAM(s) Oral daily  budesonide 160 MICROgram(s)/formoterol 4.5 MICROgram(s) Inhaler 2 Puff(s) Inhalation two times a day  carvedilol 25 milliGRAM(s) Oral every 12 hours  dextrose 5%. 1000 milliLiter(s) (50 mL/Hr) IV Continuous <Continuous>  dextrose 5%. 1000 milliLiter(s) (100 mL/Hr) IV Continuous <Continuous>  dextrose 50% Injectable 25 Gram(s) IV Push once  dextrose 50% Injectable 12.5 Gram(s) IV Push once  dextrose 50% Injectable 25 Gram(s) IV Push once  glucagon  Injectable 1 milliGRAM(s) IntraMuscular once  insulin lispro (ADMELOG) corrective regimen sliding scale   SubCutaneous three times a day before meals  loratadine 10 milliGRAM(s) Oral daily  losartan 50 milliGRAM(s) Oral daily  methylPREDNISolone sodium succinate Injectable 20 milliGRAM(s) IV Push daily  montelukast 10 milliGRAM(s) Oral daily  tamsulosin 0.4 milliGRAM(s) Oral at bedtime      PHYSICAL EXAM:  T(C): 36.6 (06-02-22 @ 12:52), Max: 37.1 (06-02-22 @ 05:27)  HR: 92 (06-02-22 @ 12:52) (83 - 100)  BP: 118/82 (06-02-22 @ 12:52) (110/83 - 142/98)  RR: 18 (06-02-22 @ 12:52) (18 - 18)  SpO2: 94% (06-02-22 @ 12:52) (94% - 96%)  Wt(kg): --  I&O's Summary    01 Jun 2022 07:01  -  02 Jun 2022 07:00  --------------------------------------------------------  IN: 1150 mL / OUT: 750 mL / NET: 400 mL    02 Jun 2022 07:01  -  02 Jun 2022 14:09  --------------------------------------------------------  IN: 360 mL / OUT: 0 mL / NET: 360 mL          Appearance: Normal	  HEENT:  PERRLA   Lymphatic: No lymphadenopathy   Cardiovascular: Normal S1 S2, no JVD  Respiratory: normal effort , clear  Gastrointestinal:  Soft, Non-tender  Skin: No rashes,  warm to touch  Psychiatry:  Mood & affect appropriate  Musculoskeletal: Mild edema - improving         06-01-22 @ 07:01  -  06-02-22 @ 07:00  --------------------------------------------------------  IN: 1150 mL / OUT: 750 mL / NET: 400 mL    06-02-22 @ 07:01  -  06-02-22 @ 14:09  --------------------------------------------------------  IN: 360 mL / OUT: 0 mL / NET: 360 mL                            16.2   11.77 )-----------( 155      ( 02 Jun 2022 08:29 )             46.5               06-02    135  |  97  |  32<H>  ----------------------------<  107<H>  4.3   |  28  |  1.14    Ca    9.5      02 Jun 2022 08:29      PT/INR - ( 02 Jun 2022 08:29 )   PT: 41.8 sec;   INR: 3.59 ratio         PTT - ( 02 Jun 2022 08:29 )  PTT:42.6 sec

## 2022-09-26 NOTE — H&P PST ADULT - GENERAL
Right L4/5 TFESI.    Payor: [employer] / Plan: GROUP MEDICARE ADV PPO / Product Type: MEDICARE    Estimated body mass index is 31.93 kg/m² as calculated from the following:    Height as of 5/16/22: 5' 8\" (1.727 m).    Weight as of 6/21/22: 95.3 kg (210 lb).     details…

## 2022-11-21 NOTE — CHART NOTE - NSCHARTNOTEFT_GEN_A_CORE
Vascular Surgery Note    Was called by primary team today regarding management of pseudoaneurysm. Upon review of imaging (CT abd/pelvis, MRI abdomen), no pseudoaneurysm noted. Spoke to radiology - no identifiable pseudoaneurysm on recent MRI.     Called covering NP - If primary team would like surgical evaluation for pancreatic hematoma, recommend IR evaluation vs general surgery consult. Per radiology, if there is continued concern for any pseudoaneurysm can obtain CT abd/pelvis pancreas protocol without contrast.     Discussed with vascular fellow    SANTY Olson PGY2  Vascular surgery  [id number] [facility]    Need for Home Care Service was communicated by Dianna Luther PA-C on 11-21-22.  Home Care Agency choice was offered by CHAPIS Rice on 11-21-22 and patient chose [facility].    Spoke with Ale, and with her permission, fernanda Reji Reji Pressley and dionicio Cheema.      Patient has given verbal permission and instructions for all telephone contact as  follows:    Patient will be seen at the home address from SOC [email or web address] at  [facility].  Patient has given permission to contact Son Reji [phone] and his spouse Anette [phone] for all scheduling.  Patient will always be at the [place] address SAT-SUN      Beginning on Monday 11-28 (always MON-FRI) she will be at  [facility]  [address] [place] [address]. This is friend Yane's house.  Yane can be reached at [phone]    @ [facility] Intake department.   [facility] Olivia CASTILLO-Ed, RN  [facility] Liaison  Phone: [phone]

## 2023-08-10 NOTE — PATIENT PROFILE ADULT - TOBACCO USE
[facility]   Physical Therapy Treatment Note      Name: Karla Price  [facility] Number: [id number]    Therapy Diagnosis:   Encounter Diagnoses   Name Primary?    Acute pain of left knee Yes    Knee stiffness, left        Physician: Camila Holcomb MD    Visit Date: 8/10/2023    Physician Orders: PT Eval and Treat  Medical Diagnosis from Referral:   S83.512A (ICD-10-CM) - Rupture of anterior cruciate ligament of left knee, initial encounter   S83.242A (ICD-10-CM) - Tear of medial meniscus of left knee, current, unspecified tear type, initial encounter   S83.282A (ICD-10-CM) - Tear of lateral meniscus of left knee, current, unspecified tear type, initial encounter      Evaluation Date: 6/15/2023  Authorization Period Expiration: 12/31/23  Plan of Care Expiration: 3/29/24  Progress Note Due: 7/15/23  Visit # / Visits authorized: 29/40 (4 new auth)  FOTO: 2/2 administered 8/10/23     Precautions: DOS 6/13/23  Procedure:  1. Left Knee Arthroscopy, anterior cruciate ligament reconstruction 50814  2.  Left Knee open lateral extra-articular tenodesis (modified Irma procedure)  3.  Left Knee Arthroscopy, with meniscus repair (medial AND lateral) 47942  4.  Left Knee open lateral collateral ligament repair  5.  Left Knee  arthroscopic loose body removal      vertical tear in the medial meniscus with 1 suture, vertical tear of lateral meniscus with 2 sutures     Post-Op Plan:  ACL reconstruction with quad tendon autograft and medial and lateral meniscal repairs.  Toe-touch weight-bearing for 2-4 weeks followed by partial and full weight-bearing by 6 weeks.  She will require a hinged brace for 6 weeks.  We will start a CPM with passive motion immediately.      PTA Visit #: 0/5     Time In: 5:05 pm  Time Out: 6:10 pm  Total Billable Time: 65 minutes    Subjective     Pt reports: was sore for 1-2 days following prior session but otherwise no complaints  She was compliant with home exercise  "program.  Response to previous treatment: improved ROM  Functional change: ongoing    Pain: 0/10  Location: left knee      Objective    DOS 6/13: 7 weeks 2 days POD as of 8/3/23    Knee Passive Range of Motion:    Right  Left    Flexion    Extension 10 hyper 10 hyper     Treatment     Karla received the treatments listed below:    therapeutic exercises to develop strength and ROM for 10 minutes including:  Prone quad stretch 30s x5  Heel slides 3x10   Knee extension 2 up 1 down 3x10    Not performed today  LLLD knee extension with heel prop 8 mins     manual therapy techniques: Joint mobilizations and Soft tissue Mobilization were applied to the: L knee for 5 minutes, including:  Extension hinge   Patellar mobs all directions   Fat pat mobilization  PROM flex EOM    neuromuscular re-education activities to improve: Kinesthetic and Proprioception for 40 minutes. The following activities were included:  BFR applied for the following, 80% OKC reps/sets 30/15/15/15  -quad sets  -SLR  -LAQ  -DL shuttle 87.5# 3x10  -SL shuttle 50# 3x10B    24" LLE bias box squat 3x10  6" forward step up 3x10    Resisted lateral steps GTB at ankles x 3 laps   20" LLE bias box squat  TRX split squat 3x10  6" lateral step down          Not today:    - LAQs 5#   - supine SLR    Standing TKE orange monster band  Retro gait x 4 laps  Clamshells 3x10 GTB   -S/L hip abd     therapeutic activities to improve functional performance for 15 minutes, including:  Stationary bike x 10' to improve CV endurance and tissue extensibility  45# sled push/pull x laps    Patient Education and Home Exercises       Education provided:   - HEP, POC, answered patient questions      Written Home Exercises Provided: yes. Exercises were reviewed and Karla was able to demonstrate them prior to the end of the session.  Karla demonstrated good  understanding of the education provided. See EMR under Patient Instructions for exercises provided during therapy " sessions    Assessment   Karla completed session as noted above with continued progressions made to [facility] functional movement patterns. Initially demo'd compensatory weight shift to RLE during box squats that improved with modification to LLE bias squat to increase loading to LLE. Sig muscle fasciculations noted with fwd step up 2/2 overall fatigue. Good response to treatment session but fatigues very easily. Will continue to progress as tolerated per protocol        Karla Is progressing well towards her goals.   Pt prognosis is Excellent.     Pt will continue to benefit from skilled outpatient physical therapy to address the deficits listed in the problem list box on initial evaluation, provide pt/family education and to maximize pt's level of independence in the home and community environment.     Pt's spiritual, cultural and educational needs considered and pt agreeable to plan of care and goals.     Anticipated barriers to physical therapy: none    Post-op Knee   Short Term Goals ( 4 Weeks ):   1. Pt will report reduced pain by 20 to 40% or greater for improved mobility, sleep  and ambulation.   2. Patients knee edema reduced by 1/4 to 1/2 inch or greater to enhance flexion ROM and knee comfort.   3. Pt to report minimal to no pain to palpation for improved level of comfort.   4. Pt to demonstrate symmetrical weight bearing w/o increased pain for stability and functional ambulation .  5. Pts neuromuscular response will be enhanced for unilateral standing stability and balance   6. Pt to achieve 90 degrees of passive knee flexion for restoring functional knee mobility, ambulating with proper gait pattern and sit to stand ability.   7 Pt to report understanding of all instruction pertinent to patient safety , gait instruction and HEP.    8. Pt to exhibit correct return demonstration of exercises for self-management and independence with HEP     Long Term Goals (32 Weeks):  1. Pt will demonstrate increased knee  flexion AROM to 120 degrees or greater for return to functional activity  2. Pt will demonstrate increased knee extension AROM to 0 degrees for standing stability   3. Pt will demonstrate increased RLE strength by 1/2 to 1 grade for improved functional stability  4. Pt to demonstrate standing stability unsupported on dynamic surfaces for functional return to ADL and recreational activities.   5.The patient will be independent amb with no assistive device on all surfaces for community distances.  6. Pt to demonstrate independence with HEP for self management  7. Patient will return to exercising in gym for fitness and recreation 3x/wk within 6 months.  8. Patient will improve FOTO score to </= 15% limited to decrease perceived limitation with mobility.    Plan     Cont POC    ERIKA TOLLIVER, PT, DPT, SCS                         Never smoker

## 2023-12-11 ENCOUNTER — EMERGENCY (EMERGENCY)
Facility: HOSPITAL | Age: 83
LOS: 1 days | Discharge: HOME | End: 2023-12-11
Attending: EMERGENCY MEDICINE
Payer: MEDICARE

## 2023-12-11 VITALS
RESPIRATION RATE: 20 BRPM | DIASTOLIC BLOOD PRESSURE: 80 MMHG | SYSTOLIC BLOOD PRESSURE: 117 MMHG | OXYGEN SATURATION: 99 % | HEART RATE: 99 BPM

## 2023-12-11 VITALS
DIASTOLIC BLOOD PRESSURE: 126 MMHG | RESPIRATION RATE: 20 BRPM | TEMPERATURE: 98 F | HEIGHT: 66 IN | OXYGEN SATURATION: 98 % | WEIGHT: 197.98 LBS | SYSTOLIC BLOOD PRESSURE: 170 MMHG | HEART RATE: 124 BPM

## 2023-12-11 DIAGNOSIS — Z98.49 CATARACT EXTRACTION STATUS, UNSPECIFIED EYE: Chronic | ICD-10-CM

## 2023-12-11 DIAGNOSIS — Z96.653 PRESENCE OF ARTIFICIAL KNEE JOINT, BILATERAL: Chronic | ICD-10-CM

## 2023-12-11 DIAGNOSIS — Z98.89 OTHER SPECIFIED POSTPROCEDURAL STATES: Chronic | ICD-10-CM

## 2023-12-11 LAB
HCT VFR BLD CALC: 50.7 % — HIGH (ref 39–50)
HCT VFR BLD CALC: 50.7 % — HIGH (ref 39–50)
HGB BLD-MCNC: 17.3 G/DL — HIGH (ref 13–17)
HGB BLD-MCNC: 17.3 G/DL — HIGH (ref 13–17)
INR BLD: 1.6 RATIO — HIGH (ref 0.85–1.18)
INR BLD: 1.6 RATIO — HIGH (ref 0.85–1.18)
MCHC RBC-ENTMCNC: 33.1 PG — SIGNIFICANT CHANGE UP (ref 27–34)
MCHC RBC-ENTMCNC: 33.1 PG — SIGNIFICANT CHANGE UP (ref 27–34)
MCHC RBC-ENTMCNC: 34.1 GM/DL — SIGNIFICANT CHANGE UP (ref 32–36)
MCHC RBC-ENTMCNC: 34.1 GM/DL — SIGNIFICANT CHANGE UP (ref 32–36)
MCV RBC AUTO: 97.1 FL — SIGNIFICANT CHANGE UP (ref 80–100)
MCV RBC AUTO: 97.1 FL — SIGNIFICANT CHANGE UP (ref 80–100)
NRBC # BLD: 0 /100 WBCS — SIGNIFICANT CHANGE UP (ref 0–0)
NRBC # BLD: 0 /100 WBCS — SIGNIFICANT CHANGE UP (ref 0–0)
PLATELET # BLD AUTO: 193 K/UL — SIGNIFICANT CHANGE UP (ref 150–400)
PLATELET # BLD AUTO: 193 K/UL — SIGNIFICANT CHANGE UP (ref 150–400)
PROTHROM AB SERPL-ACNC: 17.4 SEC — HIGH (ref 9.5–13)
PROTHROM AB SERPL-ACNC: 17.4 SEC — HIGH (ref 9.5–13)
RBC # BLD: 5.22 M/UL — SIGNIFICANT CHANGE UP (ref 4.2–5.8)
RBC # BLD: 5.22 M/UL — SIGNIFICANT CHANGE UP (ref 4.2–5.8)
RBC # FLD: 12.3 % — SIGNIFICANT CHANGE UP (ref 10.3–14.5)
RBC # FLD: 12.3 % — SIGNIFICANT CHANGE UP (ref 10.3–14.5)
WBC # BLD: 9.83 K/UL — SIGNIFICANT CHANGE UP (ref 3.8–10.5)
WBC # BLD: 9.83 K/UL — SIGNIFICANT CHANGE UP (ref 3.8–10.5)
WBC # FLD AUTO: 9.83 K/UL — SIGNIFICANT CHANGE UP (ref 3.8–10.5)
WBC # FLD AUTO: 9.83 K/UL — SIGNIFICANT CHANGE UP (ref 3.8–10.5)

## 2023-12-11 PROCEDURE — 30901 CONTROL OF NOSEBLEED: CPT | Mod: LT

## 2023-12-11 PROCEDURE — 85027 COMPLETE CBC AUTOMATED: CPT

## 2023-12-11 PROCEDURE — 99284 EMERGENCY DEPT VISIT MOD MDM: CPT | Mod: 25

## 2023-12-11 PROCEDURE — 99283 EMERGENCY DEPT VISIT LOW MDM: CPT | Mod: 25

## 2023-12-11 PROCEDURE — 85610 PROTHROMBIN TIME: CPT

## 2023-12-11 PROCEDURE — 36415 COLL VENOUS BLD VENIPUNCTURE: CPT

## 2023-12-11 RX ORDER — TRANEXAMIC ACID 100 MG/ML
5 INJECTION, SOLUTION INTRAVENOUS ONCE
Refills: 0 | Status: COMPLETED | OUTPATIENT
Start: 2023-12-11 | End: 2023-12-11

## 2023-12-11 RX ADMIN — TRANEXAMIC ACID 5 MILLILITER(S): 100 INJECTION, SOLUTION INTRAVENOUS at 06:27

## 2023-12-11 NOTE — ED PROVIDER NOTE - PHYSICAL EXAMINATION
GEN: Well Appearing, Nontoxic, NAD  HEENT:  Small amount of active bleeding out of the left nasal passage, oropharynx intact  Neck: supple  CV: reg rate  RESP: normal WOB, no distress  ABD: non-distended  EXT/MSK:  FROMx4. No lower extremity edema.  SKIN: well perfused  Neuro: Grossly intact, AOX3

## 2023-12-11 NOTE — ED ADULT NURSE NOTE - NSFALLUNIVINTERV_ED_ALL_ED
Bed/Stretcher in lowest position, wheels locked, appropriate side rails in place/Call bell, personal items and telephone in reach/Instruct patient to call for assistance before getting out of bed/chair/stretcher/Non-slip footwear applied when patient is off stretcher/[place] to call system/Physically safe environment - no spills, clutter or unnecessary equipment/Purposeful proactive rounding/Room/bathroom lighting operational, light cord in reach Bed/Stretcher in lowest position, wheels locked, appropriate side rails in place/Call bell, personal items and telephone in reach/Instruct patient to call for assistance before getting out of bed/chair/stretcher/Non-slip footwear applied when patient is off stretcher/[place] to call system/Physically safe environment - no spills, clutter or unnecessary equipment/Purposeful proactive rounding/Room/bathroom lighting operational, light cord in reach

## 2023-12-11 NOTE — ED PROVIDER NOTE - PATIENT PORTAL LINK FT
You can access the FollowMyHealth Patient Portal offered by [facility] by registering at the following website: [email or web address]. By joining [employer]’s FollowMyHealth portal, you will also be able to view your health information using other applications (apps) compatible with our system. You can access the FollowMyHealth Patient Portal offered by [facility] by registering at the following website: [email or web address]. By joining [employer]’s FollowMyHealth portal, you will also be able to view your health information using other applications (apps) compatible with our system.

## 2023-12-11 NOTE — ED PROVIDER NOTE - PROGRESS NOTE DETAILS
Dr. Cee: Received signout from Dr. Donal Albarran, 83-year-old male history of A-fib on Coumadin presenting with left-sided epistaxis.  Currently with TXA and gauze in nares awaiting CBC and INR.  Patient seen at bedside, well-appearing, will follow-up labs and reassess current bleeding.  INR subtherapeutic and H/H hemoconcentrated. Packing removed, area of epistaxis identified on left nasal septum. No active bleeding, no bleeding in right nares, no blood in posterior oropharynx. Will cauterize and dc with ENT f/u. Destiny Bee M.D. (Resident Physician): On re-exam, pt still bleeding from L nostril with source identified. Silver nitrate applied. Will dc with ENT f/u.

## 2023-12-11 NOTE — ED ADULT NURSE NOTE - OBJECTIVE STATEMENT
84 YO male     via walk in presenting with complaints of  epistaxis. pt reports spontaneous nose bleed starting at 2 am. reports is on blood thinners.   Pt Axox4, gross neuro intact,  respirations even, & non-labored Abdomen soft, non-tender, non-distended. Skin warm, dry, and intact. left sided nostril actively bleeding, gauze and pressure applied. Pt placed in position of comfort. Pt educated on call bell system and provided call bell. Bed in lowest position, wheels locked, appropriate side rails raised. 82 YO male     via walk in presenting with complaints of  epistaxis. pt reports spontaneous nose bleed starting at 2 am. reports is on blood thinners.   Pt Axox4, gross neuro intact,  respirations even, & non-labored Abdomen soft, non-tender, non-distended. Skin warm, dry, and intact. left sided nostril actively bleeding, gauze and pressure applied. Pt placed in position of comfort. Pt educated on call bell system and provided call bell. Bed in lowest position, wheels locked, appropriate side rails raised.

## 2023-12-11 NOTE — ED PROVIDER NOTE - CLINICAL SUMMARY MEDICAL DECISION MAKING FREE TEXT BOX
patient on warfarin presents with episode of atraumatic epistaxis.  On exam patient has small amount of bleeding from the left nasal passage, is otherwise well-appearing, breathing comfortably, no respiratory distress, unremarkable vital signs.  Patient had TXA soaked gauze applied to the left nasal passage and pressure applied.  We will check an INR and a hemoglobin to ensure that he is not supratherapeutic.  If bleeding can be controlled, and labs are unremarkable he should be stable for discharge.  If bleeding does not stop with TXA, he may require a Rhino Rocket and ENT follow-up.

## 2023-12-11 NOTE — ED ADULT NURSE NOTE - NS ED NURSE IV DC DT
11-Dec-2023 09:25 Cimzia Counseling:  I discussed with the patient the risks of Cimzia including but not limited to immunosuppression, allergic reactions and infections.  The patient understands that monitoring is required including a PPD at baseline and must alert us or the primary physician if symptoms of infection or other concerning signs are noted.

## 2023-12-11 NOTE — ED PROVIDER NOTE - NSFOLLOWUPINSTRUCTIONS_ED_ALL_ED_FT
You have been evaluated in the Emergency Department today for a nose bleed from the left nostril. The bleeding site was cauterized.     The Hospital will call you in a couple days to schedule an appointment with an ENT doctor.    If your nose bleeds again, apply pressure for 20 minutes. Please also spray some Afrin into the nostril. If you are still bleeding, apply pressure for 20 more minutes and if you are still bleeding return to the Emergency Room.    Please also return to the Emergency Room if you experience lightheadedness, dizziness, or any other concerning symptoms.    Thank you for choosing us for your care.

## 2023-12-11 NOTE — ED PROCEDURE NOTE - ATTENDING CONTRIBUTION TO CARE
Dr. Cee: I have personally performed a face to face bedside history and physical examination of this patient. I have discussed the history, examination, review of systems, assessment and plan of management with the resident. I have reviewed the electronic medical record and amended it to reflect my history, review of systems, physical exam, assessment and plan.

## 2023-12-11 NOTE — ED PROVIDER NOTE - OBJECTIVE STATEMENT
patient on warfarin for A-fib, presents with nosebleed.  Patient states he had an episode of nosebleed on Friday that stopped.  Overnight, he developed another episode of nosebleed around 2 in the morning that did not stop with direct pressure, thus he came in for evaluation.  He denies any shortness of breath, dizziness, lightheadedness, weakness.  He denies any direct trauma to his nose.   about 2 years ago he had an episode of epistaxis that require "a balloon", but otherwise denies frequent history of epistaxis.

## 2024-01-17 NOTE — H&P PST ADULT - GASTROINTESTINAL COMMENTS
Rash
hx of rectal bleeding x1 1/30/19. Pt was admitted for workup. Pt and family report rectal bleeding during BM due to hemorrhoids.  No further episdoes

## 2024-09-30 ENCOUNTER — NON-APPOINTMENT (OUTPATIENT)
Age: 84
End: 2024-09-30

## 2024-10-01 ENCOUNTER — APPOINTMENT (OUTPATIENT)
Dept: DERMATOLOGY | Facility: CLINIC | Age: 84
End: 2024-10-01
Payer: MEDICARE

## 2024-10-01 VITALS — WEIGHT: 215 LBS | BODY MASS INDEX: 33.74 KG/M2 | HEIGHT: 67 IN

## 2024-10-01 DIAGNOSIS — B35.4 TINEA CORPORIS: ICD-10-CM

## 2024-10-01 PROCEDURE — 99204 OFFICE O/P NEW MOD 45 MIN: CPT

## 2024-10-01 RX ORDER — CICLOPIROX OLAMINE 7.7 MG/G
0.77 CREAM TOPICAL
Qty: 1 | Refills: 0 | Status: ACTIVE | COMMUNITY
Start: 2024-10-01 | End: 1900-01-01

## 2024-10-01 NOTE — PHYSICAL EXAM
[FreeTextEntry3] :  Gen:                        Well appearing, well nourished, NAD. Skin:                        Eccrine:                 Within normal limits   Abdomen:                                LE:                           Groin:                                Neuro:                     No focal deficits. Age appropriate. Psych:                     Appropriate affect.

## 2024-10-01 NOTE — HISTORY OF PRESENT ILLNESS
[FreeTextEntry1] : tinea corporis [de-identified] : partly treated. started in groin and subsequently spread to pannus was switched to a new antifungal cream just a couple days ago, which seems to be helping, unsure of name

## 2024-10-01 NOTE — ASSESSMENT
[FreeTextEntry1] : #tinea corporis -partly treated with antifungal cream from outside MD, michael. d/w pt recommend complete full course of antifungal cream x 4 weeks. unsure of name, but pt confirms it is antifungal -if rash does not resolve after 4 weeks, switch to ciclopirox cr BID - sending today -rtc 6-8 weeks for repeat eval

## 2024-10-03 ENCOUNTER — APPOINTMENT (OUTPATIENT)
Dept: DERMATOLOGY | Facility: CLINIC | Age: 84
End: 2024-10-03

## 2024-11-11 ENCOUNTER — APPOINTMENT (OUTPATIENT)
Dept: DERMATOLOGY | Facility: CLINIC | Age: 84
End: 2024-11-11

## 2025-06-03 ENCOUNTER — APPOINTMENT (OUTPATIENT)
Dept: DERMATOLOGY | Facility: CLINIC | Age: 85
End: 2025-06-03
Payer: MEDICARE

## 2025-06-03 VITALS — WEIGHT: 190 LBS | BODY MASS INDEX: 29.82 KG/M2 | HEIGHT: 67 IN

## 2025-06-03 DIAGNOSIS — L30.4 ERYTHEMA INTERTRIGO: ICD-10-CM

## 2025-06-03 DIAGNOSIS — B35.4 TINEA CORPORIS: ICD-10-CM

## 2025-06-03 PROCEDURE — 99214 OFFICE O/P EST MOD 30 MIN: CPT

## 2025-06-03 PROCEDURE — G2211 COMPLEX E/M VISIT ADD ON: CPT

## 2025-06-03 RX ORDER — MOMETASONE FUROATE 1 MG/G
0.1 OINTMENT TOPICAL
Qty: 1 | Refills: 1 | Status: ACTIVE | COMMUNITY
Start: 2025-06-03 | End: 1900-01-01

## 2025-06-03 RX ORDER — NYSTATIN 100000 U/G
100000 OINTMENT TOPICAL
Qty: 2 | Refills: 0 | Status: ACTIVE | COMMUNITY
Start: 2025-06-03 | End: 1900-01-01

## 2025-06-03 RX ORDER — FLUCONAZOLE 150 MG/1
150 TABLET ORAL
Qty: 4 | Refills: 0 | Status: ACTIVE | COMMUNITY
Start: 2025-06-03 | End: 1900-01-01

## 2025-06-19 ENCOUNTER — APPOINTMENT (OUTPATIENT)
Dept: DERMATOLOGY | Facility: CLINIC | Age: 85
End: 2025-06-19
Payer: MEDICARE

## 2025-06-19 VITALS — BODY MASS INDEX: 29.82 KG/M2 | HEIGHT: 67 IN | WEIGHT: 190 LBS

## 2025-06-19 PROCEDURE — 99214 OFFICE O/P EST MOD 30 MIN: CPT

## 2025-06-19 PROCEDURE — G2211 COMPLEX E/M VISIT ADD ON: CPT

## 2025-08-25 ENCOUNTER — APPOINTMENT (OUTPATIENT)
Dept: DERMATOLOGY | Facility: CLINIC | Age: 85
End: 2025-08-25
Payer: MEDICARE

## 2025-08-25 VITALS — BODY MASS INDEX: 29.82 KG/M2 | WEIGHT: 190 LBS | HEIGHT: 67 IN

## 2025-08-25 DIAGNOSIS — L30.4 ERYTHEMA INTERTRIGO: ICD-10-CM

## 2025-08-25 DIAGNOSIS — L85.3 XEROSIS CUTIS: ICD-10-CM

## 2025-08-25 DIAGNOSIS — L21.9 SEBORRHEIC DERMATITIS, UNSPECIFIED: ICD-10-CM

## 2025-08-25 PROCEDURE — G2211 COMPLEX E/M VISIT ADD ON: CPT

## 2025-08-25 PROCEDURE — 99214 OFFICE O/P EST MOD 30 MIN: CPT

## 2025-08-25 RX ORDER — TACROLIMUS 1 MG/G
0.1 OINTMENT TOPICAL
Qty: 1 | Refills: 1 | Status: ACTIVE | COMMUNITY
Start: 2025-08-25 | End: 1900-01-01

## 2025-08-25 RX ORDER — CICLOPIROX OLAMINE 7.7 MG/G
0.77 CREAM TOPICAL
Qty: 2 | Refills: 1 | Status: ACTIVE | COMMUNITY
Start: 2025-08-25 | End: 1900-01-01